# Patient Record
Sex: FEMALE | Race: WHITE | Employment: OTHER | URBAN - METROPOLITAN AREA
[De-identification: names, ages, dates, MRNs, and addresses within clinical notes are randomized per-mention and may not be internally consistent; named-entity substitution may affect disease eponyms.]

---

## 2018-01-30 ENCOUNTER — HOSPITAL ENCOUNTER (OUTPATIENT)
Dept: GENERAL RADIOLOGY | Age: 68
Discharge: HOME | End: 2018-01-30
Attending: INTERNAL MEDICINE
Payer: MEDICARE

## 2018-01-30 DIAGNOSIS — R05.9 COUGH: ICD-10-CM

## 2018-01-30 PROCEDURE — 71046 X-RAY EXAM CHEST 2 VIEWS: CPT

## 2018-03-06 PROBLEM — E11.59 HYPERTENSION COMPLICATING DIABETES (HCC): Status: ACTIVE | Noted: 2018-03-06

## 2018-03-06 PROBLEM — I15.2 HYPERTENSION COMPLICATING DIABETES (HCC): Status: ACTIVE | Noted: 2018-03-06

## 2020-01-03 ENCOUNTER — HOSPITAL ENCOUNTER (EMERGENCY)
Age: 70
Discharge: HOME | End: 2020-01-03
Attending: EMERGENCY MEDICINE
Payer: MEDICARE

## 2020-01-03 ENCOUNTER — APPOINTMENT (OUTPATIENT)
Dept: CT IMAGING | Age: 70
End: 2020-01-03
Attending: EMERGENCY MEDICINE
Payer: MEDICARE

## 2020-01-03 ENCOUNTER — APPOINTMENT (OUTPATIENT)
Dept: GENERAL RADIOLOGY | Age: 70
End: 2020-01-03
Attending: EMERGENCY MEDICINE
Payer: MEDICARE

## 2020-01-03 VITALS
HEART RATE: 83 BPM | DIASTOLIC BLOOD PRESSURE: 72 MMHG | HEIGHT: 61 IN | SYSTOLIC BLOOD PRESSURE: 156 MMHG | WEIGHT: 175 LBS | RESPIRATION RATE: 16 BRPM | BODY MASS INDEX: 33.04 KG/M2 | TEMPERATURE: 99.5 F | OXYGEN SATURATION: 95 %

## 2020-01-03 DIAGNOSIS — S76.311A PULLED HAMSTRING, RIGHT, INITIAL ENCOUNTER: Primary | ICD-10-CM

## 2020-01-03 DIAGNOSIS — M25.551 ACUTE RIGHT HIP PAIN: ICD-10-CM

## 2020-01-03 PROCEDURE — 73502 X-RAY EXAM HIP UNI 2-3 VIEWS: CPT

## 2020-01-03 PROCEDURE — 96372 THER/PROPH/DIAG INJ SC/IM: CPT

## 2020-01-03 PROCEDURE — 99282 EMERGENCY DEPT VISIT SF MDM: CPT

## 2020-01-03 PROCEDURE — 74011250636 HC RX REV CODE- 250/636: Performed by: EMERGENCY MEDICINE

## 2020-01-03 PROCEDURE — 73700 CT LOWER EXTREMITY W/O DYE: CPT

## 2020-01-03 RX ORDER — IBUPROFEN 200 MG
400 TABLET ORAL
COMMUNITY
End: 2020-01-17

## 2020-01-03 RX ORDER — MORPHINE SULFATE 10 MG/ML
8 INJECTION, SOLUTION INTRAMUSCULAR; INTRAVENOUS ONCE
Status: COMPLETED | OUTPATIENT
Start: 2020-01-03 | End: 2020-01-03

## 2020-01-03 RX ORDER — HYDROCODONE BITARTRATE AND ACETAMINOPHEN 5; 325 MG/1; MG/1
1 TABLET ORAL
Qty: 8 TAB | Refills: 0 | Status: SHIPPED | OUTPATIENT
Start: 2020-01-03 | End: 2020-01-06

## 2020-01-03 RX ADMIN — MORPHINE SULFATE 8 MG: 10 INJECTION INTRAVENOUS at 12:04

## 2020-01-03 NOTE — LETTER
[facility] EMERGENCY DEPT 
[address] [facility] [place] [address] 
[phone] Work/School Note Date: 1/3/2020 To Whom It May concern: 
 
Rogelio Lanes was seen and treated today in the emergency room by the following provider(s): 
Attending Provider: Marijean Soulier, DO. Rogelio Lanes may return to work 1/10/2020 Sincerely, 
 
 
 
 
Luz Moreno RN

## 2020-01-03 NOTE — ED NOTES
Pt was discharged and given instructions by Dr Tim Soares . Pt verbalized good understanding of all discharge instructions,prescriptions and F/U care. All questions answered. Pt given walker and note for work for return in 7 days. Pt in stable condition on discharge. Pt accompanied home by her daughter.

## 2020-01-03 NOTE — ED PROVIDER NOTES
This patient presents with severe right hip and groin pain. She fell 2 nights ago at nighttime. She was walking across some uneven asphalt when she tripped and fell to her right. No head injury. She had some numbness around the right hip right after falling. Since then, she has been walking but the pain has been getting a lot worse. Pain is severe and worse with any movement or walking. She points to her buttock where most of the pain is - also has groin pain. She has never broken a bone before. No distal numbness or tingling in the right foot. No right knee pain. Her daughter had to bring her to the hospital today due to the pain severity. No recent illnesses. Fall      Old chart reviewed: Last PCP office visit was March 2018. No recent ED visits. She has a history of type 2 diabetes. Past Medical History:   Diagnosis Date    Colon polyp     [facility] 5/24/2011    Environmental allergies     HTN (hypertension) 5/24/2011    Hyperlipidemia 5/24/2011    Panic attacks     RLS (restless legs syndrome) 5/24/2011       Past Surgical History:   Procedure Laterality Date    ENDOSCOPY, COLON, DIAGNOSTIC  3/20/12    OK. Rep 10 yrs.  Melanie Patino)    HX APPENDECTOMY           Family History:   Problem Relation Age of Onset    Thyroid Disease Mother        Social History     Socioeconomic History    Marital status:      Spouse name: Not on file    Number of children: Not on file    Years of education: Not on file    Highest education level: Not on file   Occupational History    Not on file   Social Needs    Financial resource strain: Not on file    Food insecurity:     Worry: Not on file     Inability: Not on file    Transportation needs:     Medical: Not on file     Non-medical: Not on file   Tobacco Use    Smoking status: Never Smoker    Smokeless tobacco: Never Used   Substance and Sexual Activity    Alcohol use: Yes     Comment: occ    Drug use: Never    Sexual activity: Not on file   Lifestyle    Physical activity:     Days per week: Not on file     Minutes per session: Not on file    Stress: Not on file   Relationships    Social connections:     Talks on phone: Not on file     Gets together: Not on file     Attends Yarsani service: Not on file     Active member of club or organization: Not on file     Attends meetings of clubs or organizations: Not on file     Relationship status: Not on file    Intimate partner violence:     Fear of current or ex partner: Not on file     Emotionally abused: Not on file     Physically abused: Not on file     Forced sexual activity: Not on file   Other Topics Concern    Not on file   Social History Narrative    Not on file         ALLERGIES: Patient has no known allergies. Review of Systems   All other systems reviewed and are negative. Vitals:    01/03/20 1139   BP: 156/72   Pulse: 83   Resp: 16   Temp: 99.5 °F (37.5 °C)   SpO2: 95%   Weight: 79.4 kg (175 lb)   Height: 5' 1\" (1.549 m)            Physical Exam  Constitutional:       General: She is in acute distress (In pain). HENT:      Head: Normocephalic. Nose: Nose normal.      Mouth/Throat:      Mouth: Mucous membranes are moist.   Eyes:      Pupils: Pupils are equal, round, and reactive to light. Cardiovascular:      Rate and Rhythm: Normal rate and regular rhythm. Pulses: Normal pulses. Pulmonary:      Effort: Pulmonary effort is normal.      Breath sounds: Normal breath sounds. Abdominal:      Palpations: Abdomen is soft. Tenderness: There is no tenderness. Musculoskeletal:      Comments: Interestingly enough, the left lower extremity is a little shorter than the right. Patient did not know about this. Distal neurovascular exam is normal in the right lower extremity. No bruising around the right hip. Patient has significant pain whenever she moves around in bed. Skin:     General: Skin is warm and dry.    Neurological: General: No focal deficit present. Mental Status: She is alert. Psychiatric:         Mood and Affect: Mood normal.          [facility]       Procedures    Reviewed x-rays. Morphine given for pain. We were unable to get an MRI today because of a full schedule. I opted for CT. That is negative for fracture. Looks like she may have a hamstring injury. That would line up with where most of her pain is. She was able to walk with a walker. Will discharge with some pain medication.

## 2020-01-03 NOTE — ED TRIAGE NOTES
Pt assisted to treatment area via wheelchair she states that on Wednesday night she was walking out to her car and tripped over a high spot in the drive causing her to fall onto her right hip. The pain in her hip seems to be getting worse, she took 2 Ibuprofen this morning with no relief. Denies being dizzy at the time of the fall.

## 2020-01-03 NOTE — DISCHARGE INSTRUCTIONS
Use a walker or cane in your left arm as needed. Follow-up with orthopedics. Patient Education        The Hip Joint: Anatomy Sketch    Current as of: June 26, 2019  Content Version: 12.2  © 2006-2019 [employer]. Care instructions adapted under license by "[employer] (which disclaims liability or warranty for this information). If you have questions about a medical condition or this instruction, always ask your healthcare professional. [place] [address] any warranty or liability for your use of this information. Patient Education        Hamstring Syndrome: Care Instructions  Your Care Instructions    The hamstring muscles are the three muscles in the back of the thigh. The sciatic nerve is a large nerve that runs from the low back down the legs. Hamstring syndrome is a condition caused by pressure on this nerve. The nerve may be pinched between the hamstring muscles and the pelvic bone or by the band of tissue that connects the hamstring muscles. This condition can cause pain in the hip and buttock and sometimes numbness down the back of the leg. It may hurt to sit down or stretch the hamstrings. You may have less pain when you lie on your back. Hamstring syndrome may be the result of wear and tear to the back and hamstrings. It is most often seen in people who play sports that involve running, kicking, or jumping. Other problems can cause leg pain and numbness. To diagnose hamstring syndrome, the doctor will ask about your symptoms and your activities and examine your leg. Hamstring syndrome usually gets better in a few weeks with rest and home care. The doctor may recommend exercises to stretch and strengthen your hip muscles. If home care doesn't help, your doctor may suggest a steroid shot to help reduce pain and swelling. Follow-up care is a key part of your treatment and safety.  Be sure to make and go to all appointments, and call your doctor if you are having problems. It's also a good idea to know your test results and keep a list of the medicines you take. How can you care for yourself at home? · Ask your doctor if you can take an over-the-counter pain medicine, such as acetaminophen (Tylenol), ibuprofen (Advil, Motrin), or naproxen (Aleve). Be safe with medicines. Read and follow all instructions on the label. · Put ice or a cold pack on the painful area for 10 to 20 minutes at a time. Try to do this every 1 to 2 hours for the next 3 days (when you are awake) or until the swelling goes down. Put a thin cloth between the ice and your skin. · After 2 or 3 days, if your swelling is gone, apply heat. Put a warm water bottle, a heating pad set on low, or a warm cloth over the painful area. Do not go to sleep with a heating pad on your skin. · Avoid sitting if possible, unless it feels better than standing. · Alternate lying down with short walks. Increase your walking distance as you are able to walk without making your symptoms worse. · Don't do anything that makes your symptoms worse. Return to your usual level of activity slowly. When should you call for help? Watch closely for changes in your health, and be sure to contact your doctor if:    · You have new or worse pain.     · You have new symptoms.     · You do not get better as expected. Where can you learn more? Go to http://chris-shashi.info/. Enter B785 in the search box to learn more about \"Hamstring Syndrome: Care Instructions. \"  Current as of: June 26, 2019  Content Version: 12.2  © 8400-7362 [employer]. Care instructions adapted under license by [employer] (which disclaims liability or warranty for this information). If you have questions about a medical condition or this instruction, always ask your healthcare professional. [address] any warranty or liability for your use of this information.

## 2020-09-02 ENCOUNTER — OFFICE VISIT (OUTPATIENT)
Dept: SURGERY | Age: 70
End: 2020-09-02
Payer: MEDICARE

## 2020-09-02 VITALS
HEART RATE: 71 BPM | HEIGHT: 62 IN | RESPIRATION RATE: 16 BRPM | TEMPERATURE: 99.1 F | BODY MASS INDEX: 31.5 KG/M2 | SYSTOLIC BLOOD PRESSURE: 144 MMHG | DIASTOLIC BLOOD PRESSURE: 84 MMHG | WEIGHT: 171.2 LBS | OXYGEN SATURATION: 96 %

## 2020-09-02 DIAGNOSIS — B96.89 BACTERIAL SKIN INFECTION OF TRUNK: Primary | ICD-10-CM

## 2020-09-02 DIAGNOSIS — L08.9 BACTERIAL SKIN INFECTION OF TRUNK: Primary | ICD-10-CM

## 2020-09-02 PROCEDURE — G8754 DIAS BP LESS 90: HCPCS | Performed by: SURGERY

## 2020-09-02 PROCEDURE — G8400 PT W/DXA NO RESULTS DOC: HCPCS | Performed by: SURGERY

## 2020-09-02 PROCEDURE — G8753 SYS BP > OR = 140: HCPCS | Performed by: SURGERY

## 2020-09-02 PROCEDURE — 3017F COLORECTAL CA SCREEN DOC REV: CPT | Performed by: SURGERY

## 2020-09-02 PROCEDURE — 1101F PT FALLS ASSESS-DOCD LE1/YR: CPT | Performed by: SURGERY

## 2020-09-02 PROCEDURE — G8417 CALC BMI ABV UP PARAM F/U: HCPCS | Performed by: SURGERY

## 2020-09-02 PROCEDURE — 99201 PR OFFICE OUTPATIENT NEW 10 MINUTES: CPT | Performed by: SURGERY

## 2020-09-02 PROCEDURE — G8536 NO DOC ELDER MAL SCRN: HCPCS | Performed by: SURGERY

## 2020-09-02 PROCEDURE — G8432 DEP SCR NOT DOC, RNG: HCPCS | Performed by: SURGERY

## 2020-09-02 PROCEDURE — 1090F PRES/ABSN URINE INCON ASSESS: CPT | Performed by: SURGERY

## 2020-09-02 PROCEDURE — G8427 DOCREV CUR MEDS BY ELIG CLIN: HCPCS | Performed by: SURGERY

## 2020-09-02 RX ORDER — CYANOCOBALAMIN (VITAMIN B-12) 2000 MCG
2000 TABLET ORAL DAILY
COMMUNITY

## 2020-09-02 RX ORDER — CEPHALEXIN 500 MG/1
500 CAPSULE ORAL 4 TIMES DAILY
Qty: 40 CAP | Refills: 0 | Status: SHIPPED | OUTPATIENT
Start: 2020-09-02 | End: 2020-09-12

## 2020-09-02 NOTE — LETTER
9/2/20 Patient: Africa Maloney YOB: 1950 Date of Visit: 9/2/2020 Geovanna Carbajal MD 
[facility] [phone] VIA In Basket Dear Geovanna Carbajal MD, Thank you for referring Ms. Anna Seo to [facility] for evaluation. My notes for this consultation are attached. If you have questions, please do not hesitate to call me. I look forward to following your patient along with you. Sincerely, Hansa Givens MD

## 2020-09-02 NOTE — PROGRESS NOTES
Subjective:      Gina Cormier  is a 81 y.o. female referred by Dr. Padmini Deluca for evaluation of LEFT chest abscess. Pt's had LEFT upper chest abscess for about 1 week. Per pt, is at the same site from lipoma excision as a child. Pt presented to her PCP last week who gently debrided the open wound. Pt not currently on any antibiotics. Past Medical History:   Diagnosis Date    Bacterial skin infection of trunk 9/2/2020    Colon polyp     DM (diabetes mellitus) ([address] 5/24/2011    Environmental allergies     HTN (hypertension) 5/24/2011    Hyperlipidemia 5/24/2011    Panic attacks     RLS (restless legs syndrome) 5/24/2011       Past Surgical History:   Procedure Laterality Date    ENDOSCOPY, COLON, DIAGNOSTIC  3/20/12    OK. Rep 10 yrs. Alveda Pair)    HX APPENDECTOMY         Social History     Tobacco Use    Smoking status: Never Smoker    Smokeless tobacco: Never Used   Substance Use Topics    Alcohol use: Yes     Comment: occ       Family History   Problem Relation Age of Onset    Thyroid Disease Mother        Current Outpatient Medications on File Prior to Visit   Medication Sig Dispense Refill    cyanocobalamin 1,000 mcg tablet Take 1,000 mcg by mouth daily. Patient states she takes 2,000 mg tablet a day      meloxicam (MOBIC) 7.5 mg tablet TAKE 1 TABLET BY MOUTH DAILY 90 Tab 3    rOPINIRole (REQUIP) 4 mg tab TAB TAKE TWO TABLET BY MOUTH IN THE EVENING 60 Tab 10    atorvastatin (LIPITOR) 10 mg tablet TAKE 1 TABLET BY MOUTH DAILY AT BEDTIME 90 Tab 3    losartan-hydroCHLOROthiazide (HYZAAR) 100-25 mg per tablet TAKE 1 TABLET BY MOUTH DAILY 90 Tab 0    amLODIPine (NORVASC) 10 mg tablet TAKE 1 TABLET BY MOUTH DAILY 90 Tab 0    aspirin delayed-release 81 mg tablet Take  by mouth daily.  traZODone (DESYREL) 100 mg tablet TAKE 1 TABLET BY MOUTH DAILY AT BEDTIME 90 Tab 3     No current facility-administered medications on file prior to visit.         No Known Allergies      Review of Systems:    A comprehensive review of systems was negative except for that written in the History of Present Illness. Objective:     Visit Vitals  /84 (BP 1 Location: Right arm, BP Patient Position: Sitting)   Pulse 71   Temp 99.1 °F (37.3 °C) (Oral)   Resp 16   Ht 5' 2\" (1.575 m)   Wt 171 lb 3.2 oz (77.7 kg)   SpO2 96%   BMI 31.31 kg/m²        Physical Exam:  CHEST: 3.5 cm linear area over the LEFT clavicle with 2 draining sinuses. 3 x 1.5 cm area of surrounding erythema. Labs: No results found for this or any previous visit (from the past 24 hour(s)). Assessment and Plan:       ICD-10-CM ICD-9-CM    1. Bacterial skin infection of trunk  L08.9 686.9     B96.89         Prescription sent for 10 day course of Keflex. Pt may try using warm compresses to encourage further draining. Explained that these infections can sometimes completely break down the entire cyst and cyst excision is not needed. If any residual nodule remains, will consider cyst excision once the infection has completely cleared and is no longer draining. Pt will f/u with me once they have finished their antibiotics. All questions were answered and they agree with this plan. This document was scribed by Erik Barreto as dictated by Dr. Rajwinder Ramos.      Signed By: Rimma Lopez MD     09/02/20

## 2020-09-02 NOTE — PROGRESS NOTES
1. Have you been to the ER, urgent care clinic since your last visit? Hospitalized since your last visit? No    2. Have you seen or consulted any other health care providers outside of the [address] [place] [address] since your last visit? Include any pap smears or colon screening.   No

## 2020-09-14 ENCOUNTER — DOCUMENTATION ONLY (OUTPATIENT)
Dept: SLEEP MEDICINE | Age: 70
End: 2020-09-14

## 2020-09-14 ENCOUNTER — VIRTUAL VISIT (OUTPATIENT)
Dept: SLEEP MEDICINE | Age: 70
End: 2020-09-14
Payer: MEDICARE

## 2020-09-14 DIAGNOSIS — G47.33 OSA (OBSTRUCTIVE SLEEP APNEA): Primary | ICD-10-CM

## 2020-09-14 DIAGNOSIS — I10 ESSENTIAL HYPERTENSION: ICD-10-CM

## 2020-09-14 DIAGNOSIS — G25.81 RLS (RESTLESS LEGS SYNDROME): ICD-10-CM

## 2020-09-14 PROCEDURE — 1090F PRES/ABSN URINE INCON ASSESS: CPT | Performed by: INTERNAL MEDICINE

## 2020-09-14 PROCEDURE — 99203 OFFICE O/P NEW LOW 30 MIN: CPT | Performed by: INTERNAL MEDICINE

## 2020-09-14 PROCEDURE — 1101F PT FALLS ASSESS-DOCD LE1/YR: CPT | Performed by: INTERNAL MEDICINE

## 2020-09-14 PROCEDURE — 3017F COLORECTAL CA SCREEN DOC REV: CPT | Performed by: INTERNAL MEDICINE

## 2020-09-14 PROCEDURE — G8756 NO BP MEASURE DOC: HCPCS | Performed by: INTERNAL MEDICINE

## 2020-09-14 PROCEDURE — G8400 PT W/DXA NO RESULTS DOC: HCPCS | Performed by: INTERNAL MEDICINE

## 2020-09-14 PROCEDURE — G8427 DOCREV CUR MEDS BY ELIG CLIN: HCPCS | Performed by: INTERNAL MEDICINE

## 2020-09-14 PROCEDURE — G8432 DEP SCR NOT DOC, RNG: HCPCS | Performed by: INTERNAL MEDICINE

## 2020-09-14 PROCEDURE — G8417 CALC BMI ABV UP PARAM F/U: HCPCS | Performed by: INTERNAL MEDICINE

## 2020-09-14 PROCEDURE — G8536 NO DOC ELDER MAL SCRN: HCPCS | Performed by: INTERNAL MEDICINE

## 2020-09-14 NOTE — PROGRESS NOTES
PCP - Ashly Werner. Snow's office informed of need for [facility] referral.  She will forward to [employer] but may take a few days to complete. Should have today's date as initial DOS to cover her appointment with Dr. Miladys Davila.

## 2020-09-14 NOTE — PATIENT INSTRUCTIONS
[address] [place]. 1668 [facility], [address] Tel.  [phone] Fax. 100 [facility] 60 1001 [facility] Ne, [address] Tel.  [phone] Fax. [phone] [address] Tel.  [phone] Fax. [phone] Sleep Apnea: After Your Visit Your Care Instructions Sleep apnea occurs when you frequently stop breathing for 10 seconds or longer during sleep. It can be mild to severe, based on the number of times per hour that you stop breathing or have slowed breathing. Blocked or narrowed airways in your nose, mouth, or throat can cause sleep apnea. Your airway can become blocked when your throat muscles and tongue relax during sleep. Sleep apnea is common, occurring in 1 out of 20 individuals. Individuals having any of the following characteristics should be evaluated and treated right away due to high risk and detrimental consequences from untreated sleep apnea: 
1. Obesity 2. Congestive Heart failure 3. Atrial Fibrillation 4. Uncontrolled Hypertension 5. Type II Diabetes 6. Night-time Arrhythmias 7. Stroke 8. Pulmonary Hypertension 9. High-risk Driving Populations (pilots, truck drivers, etc.) 10. Patients Considering Weight-loss Surgery How do you know you have sleep apnea? You probably have sleep apnea if you answer 'yes' to 3 or more of the following questions: S - Have you been told that you Snore? T - Are you often Tired during the day? O - Has anyone Observed you stop breathing while sleeping? P- Do you have (or are being treated for) high blood Pressure? B - Are you obese (Body Mass Index > 35)? A - Is your Age 48years old or older? N - Is your Neck size greater than 16 inches? G - Are you male Gender? A sleep physician can prescribe a breathing device that prevents tissues in the throat from blocking your airway.  Or your doctor may recommend using a dental device (oral breathing device) to help keep your airway open. In some cases, surgery may be needed to remove enlarged tissues in the throat. Follow-up care is a key part of your treatment and safety. Be sure to make and go to all appointments, and call your doctor if you are having problems. It's also a good idea to know your test results and keep a list of the medicines you take. How can you care for yourself at home? · Lose weight, if needed. It may reduce the number of times you stop breathing or have slowed breathing. · Go to bed at the same time every night. · Sleep on your side. It may stop mild apnea. If you tend to roll onto your back, sew a pocket in the back of your pajama top. Put a tennis ball into the pocket, and stitch the pocket shut. This will help keep you from sleeping on your back. · Avoid alcohol and medicines such as sleeping pills and sedatives before bed. · Do not smoke. Smoking can make sleep apnea worse. If you need help quitting, talk to your doctor about stop-smoking programs and medicines. These can increase your chances of quitting for good. · Prop up the head of your bed 4 to 6 inches by putting bricks under the legs of the bed. · Treat breathing problems, such as a stuffy nose, caused by a cold or allergies. · Use a continuous positive airway pressure (CPAP) breathing machine if lifestyle changes do not help your apnea and your doctor recommends it. The machine keeps your airway from closing when you sleep. · If CPAP does not help you, ask your doctor whether you should try other breathing machines. A bilevel positive airway pressure machine has two types of air pressureâone for breathing in and one for breathing out. Another device raises or lowers air pressure as needed while you breathe. · If your nose feels dry or bleeds when using one of these machines, talk with your doctor about increasing moisture in the air. A humidifier may help.  
· If your nose is runny or stuffy from using a breathing machine, talk with your doctor about using decongestants or a corticosteroid nasal spray. When should you call for help? Watch closely for changes in your health, and be sure to contact your doctor if: 
· You still have sleep apnea even though you have made lifestyle changes. · You are thinking of trying a device such as CPAP. · You are having problems using a CPAP or similar machine. Where can you learn more? Go to [employer]ok. Enter U105 in the search box to learn more about \"Sleep Apnea: After Your Visit. \"  
© 2907-6308 Healthwise, Incorporated. Care instructions adapted under license by [employer] (which disclaims liability or warranty for this information). This care instruction is for use with your licensed healthcare professional. If you have questions about a medical condition or this instruction, always ask your healthcare professional. Roxann Mendez any warranty or liability for your use of this information. PROPER SLEEP HYGIENE What to avoid · Do not have drinks with caffeine, such as coffee or black tea, for 8 hours before bed. · Do not smoke or use other types of tobacco near bedtime. Nicotine is a stimulant and can keep you awake. · Avoid drinking alcohol late in the evening, because it can cause you to wake in the middle of the night. · Do not eat a big meal close to bedtime. If you are hungry, eat a light snack. · Do not drink a lot of water close to bedtime, because the need to urinate may wake you up during the night. · Do not read or watch TV in bed. Use the bed only for sleeping and sexual activity. What to try · Go to bed at the same time every night, and wake up at the same time every morning. Do not take naps during the day. · Keep your bedroom quiet, dark, and cool. · Get regular exercise, but not within 3 to 4 hours of your bedtime. Bishop Jose · Sleep on a comfortable pillow and mattress. · If watching the clock makes you anxious, turn it facing away from you so you cannot see the time. · If you worry when you lie down, start a worry book. Well before bedtime, write down your worries, and then set the book and your concerns aside. · Try meditation or other relaxation techniques before you go to bed. · If you cannot fall asleep, get up and go to another room until you feel sleepy. Do something relaxing. Repeat your bedtime routine before you go to bed again. · Make your house quiet and calm about an hour before bedtime. Turn down the lights, turn off the TV, log off the computer, and turn down the volume on music. This can help you relax after a busy day. Drowsy Driving The [employer] cites drowsiness as a causing factor in more than 709,588 police reported crashes annually, resulting in 76,000 injuries and 1,500 deaths. Other surveys suggest 55% of people polled have driven while drowsy in the past year, 23% had fallen asleep but not crashed, 3% crashed, and 2% had and accident due to drowsy driving. Who is at risk? Young Drivers: One study of drowsy driving accidents states that 55% of the drivers were under 25 years. Of those, 75% were male. Shift Workers and Travelers: People who work overnight or travel across time zones frequently are at higher risk of experiencing Circadian Rhythm Disorders. They are trying to work and function when their body is programed to sleep. Sleep Deprived: Lack of sleep has a serious impact on your ability to pay attention or focus on a task. Consistently getting less than the average of 8 hours your body needs creates partial or cumulative sleep deprivation. Untreated Sleep Disorders: Sleep Apnea, Narcolepsy, R.L.S., and other sleep disorders (untreated) prevent a person from getting enough restful sleep.  This leads to excessive daytime sleepiness and increases the risk for drowsy driving accidents by up to 7 times. Medications / Alcohol: Even over the counter medications can cause drowsiness. Medications that impair a drivers attention should have a warning label. Alcohol naturally makes you sleepy and on its own can cause accidents. Combined with excessive drowsiness its effects are amplified. Signs of Drowsy Driving: * You don't remember driving the last few miles * You may drift out of your lorene * You are unable to focus and your thoughts wander * You may yawn more often than normal 
 * You have difficulty keeping your eyes open / nodding off * Missing traffic signs, speeding, or tailgating Prevention-  
Good sleep hygiene, lifestyle and behavioral choices have the most impact on drowsy driving. There is no substitute for sleep and the average person requires 8 hours nightly. If you find yourself driving drowsy, stop and sleep. Consider the sleep hygiene tips provided during your visit as well. Medication Refill Policy: Refills for all medications require 1 week advance notice. Please have your pharmacy fax a refill request. We are unable to fax, or call in \"controled substance\" medications and you will need to pick these prescriptions up from our office. Red Rover Activation Thank you for requesting access to Red Rover. Please follow the instructions below to securely access and download your online medical record. Red Rover allows you to send messages to your doctor, view your test results, renew your prescriptions, schedule appointments, and more. How Do I Sign Up? 1. In your internet browser, go to [email or web address]. 2. Click on the First Time User? Click Here link in the Sign In box. You will see the New Member Sign Up page. 3. Enter your Red Rover Access Code exactly as it appears below. You will not need to use this code after youve completed the sign-up process.  If you do not sign up before the expiration date, you must request a new code. Tvinci Access Code: Activation code not generated Current Tvinci Status: Active (This is the date your Tvinci access code will ) 4. Enter the last four digits of your Social Security Number (xxxx) and Date of Birth (mm/dd/yyyy) as indicated and click Submit. You will be taken to the next sign-up page. 5. Create a [employer] ID. This will be your Tvinci login ID and cannot be changed, so think of one that is secure and easy to remember. 6. Create a Tvinci password. You can change your password at any time. 7. Enter your Password Reset Question and Answer. This can be used at a later time if you forget your password. 8. Enter your e-mail address. You will receive e-mail notification when new information is available in [address] 9. Click Sign Up. You can now view and download portions of your medical record. 10. Click the Download Summary menu link to download a portable copy of your medical information. Additional Information If you have questions, please call [phone]. Remember, Tvinci is NOT to be used for urgent needs. For medical emergencies, dial 911.

## 2020-09-14 NOTE — PROGRESS NOTES
Patient gave verbal consent to virtual visit with Dr. Nona Steinberg today. She would like the link sent to her mobile [phone]. She was informed that this is a billable service and will be sent to her insurance. Patient also informed of copayment for visit.

## 2020-09-14 NOTE — PROGRESS NOTES
217 [facility]. [place], [address]  Tel.  [phone]  Fax. 100 [facility] 60  [place], [address] [facility]  Tel.  [phone]  Fax. [phone] 91 [facility] [address]  Tel.  [phone]  Fax. [phone]         Subjective:    Omid Membreno is a 81 y.o. female who was seen by synchronous (real-time) audio-video technology on 9/14/2020. Consent:  She is aware that this patient-initiated Telehealth encounter is a billable service, with coverage as determined by her insurance carrier. She is aware that she may receive a bill and has provided verbal consent to proceed: Yes    I was at home while conducting this encounter. Patient verified with 's license. She complains of snoring associated with snorting, periods of not breathing, kicking wiht legs, twitching of legs and feet and at times vivid dreams. Symptoms began several years ago, gradually worsening since that time. She usually can fall asleep in 60 minutes. Family or house members note snoring, periods of not breathing. She denies completely or partially paralyzed while falling asleep or waking up. Omid Membreno does wake up frequently at night. She is bothered by waking up too early and left unable to get back to sleep. She actually sleeps about 5 hours at night and wakes up about 4-6 times during the night. She does not work shifts. Paige Green indicates she does get too little sleep at night. Her bedtime is 9:00 pm. She awakens at 7:00 am. She does take naps. She takes 3 naps a week lasting 1 hour. She has the following observed behaviors:  ;  .  Other remarks:  She reports that she is currently taking Requip for RLS. [place] Sleepiness Score: 18   and Modified F.O.S.Q. Score Total / 2: 7.5       No Known Allergies      Current Outpatient Medications:     cyanocobalamin 1,000 mcg tablet, Take 1,000 mcg by mouth daily.  Patient states she takes 2,000 mg tablet a day, Disp: , Rfl:     meloxicam (MOBIC) 7.5 mg tablet, TAKE 1 TABLET BY MOUTH DAILY, Disp: 90 Tab, Rfl: 3    rOPINIRole (REQUIP) 4 mg tab TAB, TAKE TWO TABLET BY MOUTH IN THE EVENING, Disp: 60 Tab, Rfl: 10    atorvastatin (LIPITOR) 10 mg tablet, TAKE 1 TABLET BY MOUTH DAILY AT BEDTIME, Disp: 90 Tab, Rfl: 3    traZODone (DESYREL) 100 mg tablet, TAKE 1 TABLET BY MOUTH DAILY AT BEDTIME, Disp: 90 Tab, Rfl: 3    losartan-hydroCHLOROthiazide (HYZAAR) 100-25 mg per tablet, TAKE 1 TABLET BY MOUTH DAILY, Disp: 90 Tab, Rfl: 0    amLODIPine (NORVASC) 10 mg tablet, TAKE 1 TABLET BY MOUTH DAILY, Disp: 90 Tab, Rfl: 0    aspirin delayed-release 81 mg tablet, Take  by mouth daily. , Disp: , Rfl:      She  has a past medical history of Bacterial skin infection of trunk (9/2/2020), Colon polyp, DM (diabetes mellitus) ([address] (5/24/2011), Environmental allergies, HTN (hypertension) (5/24/2011), Hyperlipidemia (5/24/2011), Panic attacks, and RLS (restless legs syndrome) (5/24/2011). She  has a past surgical history that includes endoscopy, colon, diagnostic (3/20/12) and hx appendectomy. She family history includes Thyroid Disease in her mother. She  reports that she has never smoked. She has never used smokeless tobacco. She reports current alcohol use. She reports that she does not use drugs.      Review of Systems:  Constitutional:  No significant weight loss or weight gain  Eyes:  No blurred vision  CVS:  No significant chest pain  Pulm:  No significant shortness of breath  GI:  No significant nausea or vomiting  :  No significant nocturia  Musculoskeletal:  No significant joint pain at night  Skin:  No significant rashes  Neuro:  No significant dizziness   Psych:  No active mood issues    Sleep Review of Systems: notable for difficulty falling asleep; frequent awakenings at night;  regular dreaming noted; nightmares ;  early morning headaches; no memory problems; no concentration issues; no history of any automobile or occupational accidents due to daytime drowsiness. Objective:     Patient-Reported Vitals 9/14/2020   Patient-Reported Weight 171 lbs       Physical Exam completed by visual and auditory observation of patient with verbal input from patient. General:   Alert, oriented, not in acute distress   Eyes:  Anicteric Sclerae; no obvious strabismus   Nose:  No obvious nasal septum deviation    Neck:   Midline trachea, no visible mass   Chest/Lungs:  Respiratory effort normal, no visualized signs of difficulty breathing or respiratory distress   CVS:  No JVD   Extremities:  No obvious rashes noted on face, neck, or hands   Neuro:  No facial asymmetry, no focal deficits; no obvious tremor    Psych:  Normal affect,  normal countenance       Assessment:       ICD-10-CM ICD-9-CM    1. ANA (obstructive sleep apnea)  G47.33 327.23 POLYSOMNOGRAPHY 1 NIGHT   2. Essential hypertension  I10 401.9    3. RLS (restless legs syndrome)  G25.81 333.94    4. BMI 31.0-31.9,adult  Z68.31 V85.31          Plan:        Sleep testing was ordered for initial evaluation.  She was provided information on sleep apnea including coresponding risk factors and the importance of proper treatment.  Treatment options if indicated were reviewed today. Patient agrees to a trial of PAP therapy if indicated.  Counseling was provided regarding proper sleep hygiene, appropriate sleep schedule, need for sleep environment safety and safe driving.  Recommended a dedicated weight loss program through appropriate diet and exercise regimen as significant weight reduction has been shown to reduce severity of obstructive sleep apnea. Patient's phone number [phone] (home)  was reviewed and confirmed for accuracy. She gives permission for messages regarding results and appointments to be left at that number.     Pursuant to the emergency declaration under the [address] [facility] [facility] waiver authority and the Coronavirus Preparedness and Response Supplemental Appropriations Act, this Virtual Visit was conducted, with patient's consent, to reduce the patient's risk of exposure to COVID-19 and provide continuity of care for an established patient. Services were provided through a video synchronous discussion virtually to substitute for in-person clinic visit. Kandy Payne MD, [facility]  Electronically signed.  09/14/20

## 2020-09-15 ENCOUNTER — DOCUMENTATION ONLY (OUTPATIENT)
Dept: SLEEP MEDICINE | Age: 70
End: 2020-09-15

## 2020-09-15 ENCOUNTER — TELEPHONE (OUTPATIENT)
Dept: SLEEP MEDICINE | Age: 70
End: 2020-09-15

## 2020-09-16 NOTE — TELEPHONE ENCOUNTER
3rd Attempt to reach patient to schedule PSG per Dr. Abelardo Falk recommendation. KOWNhart message sent as well.

## 2020-10-01 ENCOUNTER — HOSPITAL ENCOUNTER (OUTPATIENT)
Dept: SLEEP MEDICINE | Age: 70
Discharge: HOME | End: 2020-10-01
Payer: MEDICARE

## 2020-10-01 DIAGNOSIS — G47.33 OSA (OBSTRUCTIVE SLEEP APNEA): ICD-10-CM

## 2020-10-01 PROCEDURE — 95810 POLYSOM 6/> YRS 4/> PARAM: CPT | Performed by: INTERNAL MEDICINE

## 2020-10-01 PROCEDURE — 95807 SLEEP STUDY ATTENDED: CPT | Performed by: INTERNAL MEDICINE

## 2020-10-02 ENCOUNTER — DOCUMENTATION ONLY (OUTPATIENT)
Dept: SLEEP MEDICINE | Age: 70
End: 2020-10-02

## 2020-10-02 VITALS
HEART RATE: 71 BPM | SYSTOLIC BLOOD PRESSURE: 129 MMHG | HEIGHT: 62 IN | WEIGHT: 171 LBS | OXYGEN SATURATION: 95 % | BODY MASS INDEX: 31.47 KG/M2 | TEMPERATURE: 97.8 F | DIASTOLIC BLOOD PRESSURE: 74 MMHG

## 2020-10-02 NOTE — PROGRESS NOTES
217 [facility] [place]. [place], [address]  Tel.  [phone]  Fax. 100 [facility] 60  [place], [address] [facility]  Tel.  [phone]  Fax. [phone] [address]   Tel.  [phone]  Fax. [phone]     Sleep Study Technical Notes        PRE-Test:  Haley Block (: 1950) arrived in the lobby. Patient was greeted, temperature checked (97.8 °) and screening questions asked. The patient was taken to the Sleep Center and taken directly to his/her room. BP (129/74) and SaO2 (95) were taken. Scale currently not available. Procedure explained to the patient and questions were answered. The patient expressed understanding of the procedure. Electrodes were applied without incident. The patient was placed in bed and the study was started. Acquisition Notes:   Lights off: 10:22     Respiratory events: Hypop    ECG:  NSR   PAP titration: N/A   Other comments: Pt woke up several times to use the restroom. She stated that she has restless leg syndrome and that she does not sleep most nights.  Desensitization Mask(s) Used: N/A    POST Test:   Patient was ready to leave as soon as she met the required 6 hours. .  Electrodes were removed. The patient was discharged after answering the Post Questionnaire. Patient stated that he/she was alert and ok to drive.  Equipment and room cleaned per infection control policy.

## 2020-10-07 ENCOUNTER — TELEPHONE (OUTPATIENT)
Dept: SLEEP MEDICINE | Age: 70
End: 2020-10-07

## 2020-10-07 ENCOUNTER — DOCUMENTATION ONLY (OUTPATIENT)
Dept: SLEEP MEDICINE | Age: 70
End: 2020-10-07

## 2020-10-07 DIAGNOSIS — G47.33 OSA (OBSTRUCTIVE SLEEP APNEA): Primary | ICD-10-CM

## 2020-10-07 DIAGNOSIS — U07.1 COVID-19: ICD-10-CM

## 2020-10-07 DIAGNOSIS — G47.33 OSA (OBSTRUCTIVE SLEEP APNEA): ICD-10-CM

## 2020-10-07 NOTE — TELEPHONE ENCOUNTER
Ankit Alvarenga is to be contacted by lead sleep technologist regarding results of Sleep Testing which was indicative of an average AHI of 10.6 per hour with an SpO2 vel of 87% . * A second polysomnogram has been ordered for mask fitting, PAP desensitizing protocol, and pressure titration. * Follow-up appointment will be scheduled 8-12 weeks following PAP initiation to gauge treatment response and compliance. Encounter Diagnoses   Name Primary?  ANA (obstructive sleep apnea) Yes    COVID-19        Orders Placed This Encounter    NOVEL CORONAVIRUS (COVID-19)     Standing Status:   Future     Standing Expiration Date:   1/7/2021     Scheduling Instructions:      1) Due to current limited availability of the COVID-19 PCR test, tests will be prioritized and may not be completed.              2) Order only if the test result will change clinical management or necessary for a return to mission-critical employment decision.              3) Print and instruct patient to adhere to [facility] home isolation program. (Link Above)              4) Set up or refer patient for a monitoring program.              5) Have patient sign up for and leverage Ravnhart (if not previously done).      Order Specific Question:   Status     Answer:   Asymptomatic/Surveillance(e.g. pre-op/pre-procedure/pre-delivery/transfer)     Order Specific Question:   Reason for Test     Answer:   Upcoming elective surgery/procedure/delivery, return to work, or discharge to another facility    SLEEP LAB (PAP TITRATION)     Standing Status:   Future     Standing Expiration Date:   4/7/2021     Order Specific Question:   Reason for Exam     Answer:   ANA

## 2020-10-12 ENCOUNTER — DOCUMENTATION ONLY (OUTPATIENT)
Dept: SLEEP MEDICINE | Age: 70
End: 2020-10-12

## 2020-10-19 ENCOUNTER — TELEPHONE (OUTPATIENT)
Dept: SLEEP MEDICINE | Age: 70
End: 2020-10-19

## 2020-10-21 ENCOUNTER — TELEPHONE (OUTPATIENT)
Dept: SLEEP MEDICINE | Age: 70
End: 2020-10-21

## 2020-11-17 ENCOUNTER — TELEPHONE (OUTPATIENT)
Dept: SLEEP MEDICINE | Age: 70
End: 2020-11-17

## 2020-11-17 LAB — SARS-COV-2, NAA: NOT DETECTED

## 2020-11-17 NOTE — TELEPHONE ENCOUNTER
Called patient to confirmed sleep study scheduled for Wed 11/18/2020. Patient reports she may experience a panic attack with all the wires and leads on her and request for a Rx to help her through the panic attacks or something to help her relax that night.  Please advise

## 2020-11-18 NOTE — TELEPHONE ENCOUNTER
Left message for patient to take either melatonin or other OTC sleep aid when she arrives for the sleep test.

## 2021-01-04 ENCOUNTER — HOSPITAL ENCOUNTER (OUTPATIENT)
Dept: GENERAL RADIOLOGY | Age: 71
Discharge: HOME | End: 2021-01-04
Attending: INTERNAL MEDICINE
Payer: MEDICARE

## 2021-01-04 DIAGNOSIS — R06.09 DOE (DYSPNEA ON EXERTION): ICD-10-CM

## 2021-01-04 PROCEDURE — 71046 X-RAY EXAM CHEST 2 VIEWS: CPT

## 2021-01-19 ENCOUNTER — HOSPITAL ENCOUNTER (OUTPATIENT)
Dept: MAMMOGRAPHY | Age: 71
Discharge: HOME | End: 2021-01-19
Attending: INTERNAL MEDICINE
Payer: MEDICARE

## 2021-01-19 ENCOUNTER — HOSPITAL ENCOUNTER (OUTPATIENT)
Dept: NON INVASIVE DIAGNOSTICS | Age: 71
Discharge: HOME | End: 2021-01-19
Attending: INTERNAL MEDICINE
Payer: MEDICARE

## 2021-01-19 ENCOUNTER — HOSPITAL ENCOUNTER (OUTPATIENT)
Dept: NUCLEAR MEDICINE | Age: 71
Discharge: HOME | End: 2021-01-19
Attending: INTERNAL MEDICINE
Payer: MEDICARE

## 2021-01-19 VITALS
HEIGHT: 62 IN | WEIGHT: 182 LBS | DIASTOLIC BLOOD PRESSURE: 79 MMHG | BODY MASS INDEX: 33.49 KG/M2 | SYSTOLIC BLOOD PRESSURE: 122 MMHG

## 2021-01-19 DIAGNOSIS — Z12.31 SCREENING MAMMOGRAM, ENCOUNTER FOR: ICD-10-CM

## 2021-01-19 DIAGNOSIS — R06.09 DOE (DYSPNEA ON EXERTION): ICD-10-CM

## 2021-01-19 LAB
STRESS BASELINE DIAS BP: 83 MMHG
STRESS BASELINE HR: 68 BPM
STRESS BASELINE SYS BP: 139 MMHG
STRESS ESTIMATED WORKLOAD: 1 METS
STRESS EXERCISE DUR MIN: NORMAL
STRESS PEAK DIAS BP: 74 MMHG
STRESS PEAK SYS BP: 146 MMHG
STRESS PERCENT HR ACHIEVED: 61 %
STRESS POST PEAK HR: 92 BPM
STRESS RATE PRESSURE PRODUCT: NORMAL BPM*MMHG
STRESS ST DEPRESSION: 0 MM
STRESS ST ELEVATION: 0 MM
STRESS TARGET HR: 152 BPM

## 2021-01-19 PROCEDURE — 77067 SCR MAMMO BI INCL CAD: CPT

## 2021-01-19 PROCEDURE — 78452 HT MUSCLE IMAGE SPECT MULT: CPT

## 2021-01-19 PROCEDURE — 74011250636 HC RX REV CODE- 250/636: Performed by: INTERNAL MEDICINE

## 2021-01-19 PROCEDURE — A9500 TC99M SESTAMIBI: HCPCS

## 2021-01-19 RX ORDER — SODIUM CHLORIDE 0.9 % (FLUSH) 0.9 %
20 SYRINGE (ML) INJECTION
Status: COMPLETED | OUTPATIENT
Start: 2021-01-19 | End: 2021-01-19

## 2021-01-19 RX ADMIN — REGADENOSON 0.4 MG: 0.08 INJECTION, SOLUTION INTRAVENOUS at 09:48

## 2021-01-19 RX ADMIN — Medication 20 ML: at 09:48

## 2021-03-10 ENCOUNTER — HOSPITAL ENCOUNTER (OUTPATIENT)
Dept: MAMMOGRAPHY | Age: 71
Discharge: HOME | End: 2021-03-10
Attending: INTERNAL MEDICINE
Payer: MEDICARE

## 2021-03-10 DIAGNOSIS — R92.8 ABNORMAL MAMMOGRAM: ICD-10-CM

## 2021-03-10 PROCEDURE — 77061 BREAST TOMOSYNTHESIS UNI: CPT

## 2021-03-10 PROCEDURE — 76642 ULTRASOUND BREAST LIMITED: CPT

## 2021-07-15 ENCOUNTER — APPOINTMENT (OUTPATIENT)
Dept: GENERAL RADIOLOGY | Age: 71
End: 2021-07-15
Attending: EMERGENCY MEDICINE
Payer: MEDICARE

## 2021-07-15 ENCOUNTER — HOSPITAL ENCOUNTER (EMERGENCY)
Age: 71
Discharge: HOME | End: 2021-07-16
Payer: MEDICARE

## 2021-07-15 VITALS
TEMPERATURE: 99.6 F | DIASTOLIC BLOOD PRESSURE: 79 MMHG | WEIGHT: 177 LBS | OXYGEN SATURATION: 94 % | RESPIRATION RATE: 18 BRPM | BODY MASS INDEX: 33.42 KG/M2 | HEART RATE: 86 BPM | SYSTOLIC BLOOD PRESSURE: 158 MMHG | HEIGHT: 61 IN

## 2021-07-15 DIAGNOSIS — M62.838 TRAPEZIUS MUSCLE SPASM: Primary | ICD-10-CM

## 2021-07-15 DIAGNOSIS — M25.512 ACUTE PAIN OF LEFT SHOULDER: ICD-10-CM

## 2021-07-15 DIAGNOSIS — M19.012 PRIMARY OSTEOARTHRITIS OF LEFT SHOULDER: ICD-10-CM

## 2021-07-15 PROCEDURE — 99283 EMERGENCY DEPT VISIT LOW MDM: CPT

## 2021-07-15 PROCEDURE — 93005 ELECTROCARDIOGRAM TRACING: CPT

## 2021-07-15 PROCEDURE — 75810000275 HC EMERGENCY DEPT VISIT NO LEVEL OF CARE

## 2021-07-15 PROCEDURE — 73030 X-RAY EXAM OF SHOULDER: CPT

## 2021-07-16 LAB
ATRIAL RATE: 85 BPM
CALCULATED R AXIS, ECG10: -8 DEGREES
CALCULATED T AXIS, ECG11: 66 DEGREES
DIAGNOSIS, 93000: NORMAL
P-R INTERVAL, ECG05: 128 MS
Q-T INTERVAL, ECG07: 386 MS
QRS DURATION, ECG06: 82 MS
QTC CALCULATION (BEZET), ECG08: 459 MS
VENTRICULAR RATE, ECG03: 85 BPM

## 2021-07-16 PROCEDURE — 74011250637 HC RX REV CODE- 250/637: Performed by: PHYSICIAN ASSISTANT

## 2021-07-16 RX ORDER — METHOCARBAMOL 500 MG/1
500 TABLET, FILM COATED ORAL 3 TIMES DAILY
Qty: 15 TABLET | Refills: 0 | OUTPATIENT
Start: 2021-07-16 | End: 2021-10-28

## 2021-07-16 RX ORDER — METHOCARBAMOL 500 MG/1
500 TABLET, FILM COATED ORAL ONCE
Status: COMPLETED | OUTPATIENT
Start: 2021-07-16 | End: 2021-07-16

## 2021-07-16 RX ADMIN — METHOCARBAMOL TABLETS 500 MG: 500 TABLET, COATED ORAL at 00:13

## 2021-07-16 NOTE — ED PROVIDER NOTES
EMERGENCY DEPARTMENT HISTORY AND PHYSICAL EXAM      Date: 7/15/2021  Patient Name: Christiano Sutton    History of Presenting Illness     Chief Complaint   Patient presents with    Shoulder Pain       History Provided By: Patient    HPI: Christiano Sutton, 78 y.o. female with a past medical history significant diabetes, hypertension and OA of knees presents to the ED with cc of left-sided neck pain radiating into the left scapula and left shoulder onset yesterday. Symptoms are exacerbated by turning her head to the left as well as lifting her left arm. She denies any known injury or trauma. She denies any recent falls or head injury. She does take meloxicam for osteoarthritis of her knees. Patient denies headache, chest pain, shortness of breath, fever. No previous surgery to the left shoulder. There are no other complaints, changes, or physical findings at this time. PCP: Adrienne Talbert MD    No current facility-administered medications on file prior to encounter. Current Outpatient Medications on File Prior to Encounter   Medication Sig Dispense Refill    rOPINIRole (REQUIP) 4 mg tab TAB TAKE 2 TABLETS BY MOUTH ONCE DAILY IN THE EVENING 60 Tab 11    metFORMIN (GLUCOPHAGE) 500 mg tablet Take 1 Tab by mouth daily. 90 Tab 3    cyanocobalamin 1,000 mcg tablet Take 1,000 mcg by mouth daily. Patient states she takes 2,000 mg tablet a day      meloxicam (MOBIC) 7.5 mg tablet TAKE 1 TABLET BY MOUTH DAILY 90 Tab 3    atorvastatin (LIPITOR) 10 mg tablet TAKE 1 TABLET BY MOUTH DAILY AT BEDTIME 90 Tab 3    losartan-hydroCHLOROthiazide (HYZAAR) 100-25 mg per tablet TAKE 1 TABLET BY MOUTH DAILY 90 Tab 0    amLODIPine (NORVASC) 10 mg tablet TAKE 1 TABLET BY MOUTH DAILY 90 Tab 0    aspirin delayed-release 81 mg tablet Take  by mouth daily.          Past History     Past Medical History:  Past Medical History:   Diagnosis Date    Bacterial skin infection of trunk 9/2/2020    Colon polyp     [facility]    Environmental allergies     HTN (hypertension) 5/24/2011    Hyperlipidemia 5/24/2011    Panic attacks     RLS (restless legs syndrome) 5/24/2011       Past Surgical History:  Past Surgical History:   Procedure Laterality Date    ENDOSCOPY, COLON, DIAGNOSTIC  3/20/12    OK. Rep 10 yrs. Tana Trinidad)    HX APPENDECTOMY      HX CHOLECYSTECTOMY         Family History:  Family History   Problem Relation Age of Onset    Thyroid Disease Mother        Social History:  Social History     Tobacco Use    Smoking status: Never Smoker    Smokeless tobacco: Never Used   Substance Use Topics    Alcohol use: Yes     Comment: occ    Drug use: Never       Allergies:  No Known Allergies      Review of Systems   Review of Systems   Constitutional: Negative for activity change, chills and fever. HENT: Negative for congestion, ear pain, rhinorrhea, sneezing and sore throat. Eyes: Negative for pain and visual disturbance. Respiratory: Negative for cough and shortness of breath. Cardiovascular: Negative for chest pain. Gastrointestinal: Negative for abdominal pain, diarrhea, nausea and vomiting. Genitourinary: Negative for dysuria and hematuria. Musculoskeletal: Positive for arthralgias, myalgias, neck pain and neck stiffness. Negative for gait problem. Skin: Negative for rash. Neurological: Negative for speech difficulty, weakness and headaches. Psychiatric/Behavioral: The patient is not nervous/anxious. All other systems reviewed and are negative. Physical Exam   Physical Exam  Vitals and nursing note reviewed. Constitutional:       General: She is not in acute distress. Appearance: Normal appearance. She is not ill-appearing or toxic-appearing. HENT:      Head: Normocephalic and atraumatic. Nose: Nose normal.      Mouth/Throat:      Mouth: Mucous membranes are moist.   Eyes:      Extraocular Movements: Extraocular movements intact. Conjunctiva/sclera: Conjunctivae normal.      Pupils: Pupils are equal, round, and reactive to light. Neck:      Vascular: No JVD. Trachea: Trachea normal.     Cardiovascular:      Rate and Rhythm: Normal rate. Pulses: Normal pulses. Heart sounds: Normal heart sounds. Pulmonary:      Effort: Pulmonary effort is normal. No respiratory distress. Breath sounds: Normal breath sounds. Musculoskeletal:         General: No deformity or signs of injury. Right shoulder: Normal.      Left shoulder: No deformity, effusion or bony tenderness. Decreased range of motion (ER). Normal pulse. Cervical back: Neck supple. No torticollis. Pain with movement and muscular tenderness present. No spinous process tenderness. Decreased range of motion. Comments: Left UE: full flexion   Lymphadenopathy:      Cervical: No cervical adenopathy. Skin:     General: Skin is warm and dry. Capillary Refill: Capillary refill takes less than 2 seconds. Findings: No rash. Neurological:      General: No focal deficit present. Mental Status: She is alert and oriented to person, place, and time. Cranial Nerves: No cranial nerve deficit. Psychiatric:         Mood and Affect: Mood normal.         Diagnostic Study Results     Labs -   No results found for this or any previous visit (from the past 48 hour(s)). Radiologic Studies -   Results from Hospital Encounter encounter on 07/15/21    XR SHOULDER LT AP/LAT MIN 2 V    Narrative  History: Pain    Technique:  XR SHOULDER LT AP/LAT MIN 2 V    COMPARISON: [None]  LIMITATIONS: [None]    BONES: [Normal]    JOINTS: [Mild degenerative change of the acromioclavicular joint]    SOFT TISSUES: [Normal]    OTHER: [None]    Impression  No acute findings. AC degenerative change     CT Results  (Last 48 hours)    None          Medical Decision Making   I am the first provider for this patient.     I reviewed the vital signs, available nursing notes, past medical history, past surgical history, family history and social history. Vital Signs-Reviewed the patient's vital signs. Wt Readings from Last 3 Encounters:   07/15/21 80.3 kg (177 lb)   05/11/21 79.8 kg (176 lb)   01/19/21 82.6 kg (182 lb)     Temp Readings from Last 3 Encounters:   07/15/21 99.6 °F (37.6 °C)   01/04/21 98.1 °F (36.7 °C)   10/02/20 97.8 °F (36.6 °C)     BP Readings from Last 3 Encounters:   07/15/21 (!) 158/79   05/11/21 125/75   01/19/21 122/79     Pulse Readings from Last 3 Encounters:   07/15/21 86   10/02/20 71   09/02/20 71       No data found. Records Reviewed: Nursing Notes and Old Medical Records    Provider Notes (Medical Decision Making):     MDM  Number of Diagnoses or Management Options  Acute pain of left shoulder  Primary osteoarthritis of left shoulder  Trapezius muscle spasm  Diagnosis management comments: DDX: arthritis, AC separation, dislocation, rotator cuff injury, cervical strain       Amount and/or Complexity of Data Reviewed  Tests in the radiology section of CPT®: ordered and reviewed        ED Course:   Initial assessment performed. The patients presenting problems have been discussed, and they are in agreement with the care plan formulated and outlined with them. I have encouraged them to ask questions as they arise throughout their visit. PROCEDURES    Procedures       Disposition     Disposition: DC- Adult Discharges: All of the diagnostic tests were reviewed and questions answered. Diagnosis, care plan and treatment options were discussed. The patient understands the instructions and will follow up as directed. The patients results have been reviewed with them. They have been counseled regarding their diagnosis. The patient verbally convey understanding and agreement of the signs, symptoms, diagnosis, treatment and prognosis and additionally agrees to follow up as recommended with their PCP in 24 - 48 hours.   They also agree with the care-plan and convey that all of their questions have been answered. I have also put together some discharge instructions for them that include: 1) educational information regarding their diagnosis, 2) how to care for their diagnosis at home, as well a 3) list of reasons why they would want to return to the ED prior to their follow-up appointment, should their condition change. Discharged    DISCHARGE PLAN:  1. Current Discharge Medication List      START taking these medications    Details   methocarbamoL (ROBAXIN) 500 mg tablet Take 1 Tablet by mouth three (3) times daily. Qty: 15 Tablet, Refills: 0         CONTINUE these medications which have NOT CHANGED    Details   rOPINIRole (REQUIP) 4 mg tab TAB TAKE 2 TABLETS BY MOUTH ONCE DAILY IN THE EVENING  Qty: 60 Tab, Refills: 11      metFORMIN (GLUCOPHAGE) 500 mg tablet Take 1 Tab by mouth daily. Qty: 90 Tab, Refills: 3    Associated Diagnoses: Type 2 diabetes mellitus without complication, without long-term current use of insulin (HCC)      cyanocobalamin 1,000 mcg tablet Take 1,000 mcg by mouth daily. Patient states she takes 2,000 mg tablet a day      meloxicam (MOBIC) 7.5 mg tablet TAKE 1 TABLET BY MOUTH DAILY  Qty: 90 Tab, Refills: 3      atorvastatin (LIPITOR) 10 mg tablet TAKE 1 TABLET BY MOUTH DAILY AT BEDTIME  Qty: 90 Tab, Refills: 3      losartan-hydroCHLOROthiazide (HYZAAR) 100-25 mg per tablet TAKE 1 TABLET BY MOUTH DAILY  Qty: 90 Tab, Refills: 0      amLODIPine (NORVASC) 10 mg tablet TAKE 1 TABLET BY MOUTH DAILY  Qty: 90 Tab, Refills: 0      aspirin delayed-release 81 mg tablet Take  by mouth daily.            2.   Follow-up Information     Follow up With Specialties Details Why Contact Info    Shemar Bullard MD Orthopedic Surgery Schedule an appointment as soon as possible for a visit  for follow up from ER visit [place] [id number]  [phone]      [facility] EMERGENCY DEPT Emergency Medicine  As needed, If symptoms worsen [address] [facility] [address]  [phone]        3. Return to ED if worse   4. Discharge Medication List as of 7/16/2021 12:10 AM      START taking these medications    Details   methocarbamoL (ROBAXIN) 500 mg tablet Take 1 Tablet by mouth three (3) times daily. , Normal, Disp-15 Tablet, R-0         CONTINUE these medications which have NOT CHANGED    Details   rOPINIRole (REQUIP) 4 mg tab TAB TAKE 2 TABLETS BY MOUTH ONCE DAILY IN THE EVENING, Normal, Disp-60 Tab, R-11      metFORMIN (GLUCOPHAGE) 500 mg tablet Take 1 Tab by mouth daily. , Normal, Disp-90 Tab, R-3      cyanocobalamin 1,000 mcg tablet Take 1,000 mcg by mouth daily. Patient states she takes 2,000 mg tablet a day, Historical Med      meloxicam (MOBIC) 7.5 mg tablet TAKE 1 TABLET BY MOUTH DAILY, Normal, Disp-90 Tab, R-3      atorvastatin (LIPITOR) 10 mg tablet TAKE 1 TABLET BY MOUTH DAILY AT BEDTIME, Normal, Disp-90 Tab, R-3      losartan-hydroCHLOROthiazide (HYZAAR) 100-25 mg per tablet TAKE 1 TABLET BY MOUTH DAILY, Normal, Disp-90 Tab, R-0      amLODIPine (NORVASC) 10 mg tablet TAKE 1 TABLET BY MOUTH DAILY, Normal, Disp-90 Tab, R-0      aspirin delayed-release 81 mg tablet Take  by mouth daily. , Historical Med             Diagnosis     Clinical Impression:   1. Trapezius muscle spasm    2. Acute pain of left shoulder    3.  Primary osteoarthritis of left shoulder

## 2021-07-16 NOTE — ED TRIAGE NOTES
Patient states that she is having shoulder pain in the left shoulder the pain is constant even when she is not moving it.  Patient says that this is new and she has not had this happen before and denies injury to this shoulder

## 2021-07-16 NOTE — ED NOTES
Patient is alert and orieneted at discharge taken to car in a wheelchair by staff to meet family member

## 2021-09-14 PROBLEM — B96.89 BACTERIAL SKIN INFECTION OF TRUNK: Status: RESOLVED | Noted: 2020-09-02 | Resolved: 2021-09-14

## 2021-09-14 PROBLEM — L08.9 BACTERIAL SKIN INFECTION OF TRUNK: Status: RESOLVED | Noted: 2020-09-02 | Resolved: 2021-09-14

## 2021-10-28 ENCOUNTER — HOSPITAL ENCOUNTER (EMERGENCY)
Age: 71
Discharge: HOME | End: 2021-10-28
Attending: EMERGENCY MEDICINE
Payer: MEDICARE

## 2021-10-28 ENCOUNTER — APPOINTMENT (OUTPATIENT)
Dept: CT IMAGING | Age: 71
End: 2021-10-28
Attending: EMERGENCY MEDICINE
Payer: MEDICARE

## 2021-10-28 ENCOUNTER — APPOINTMENT (OUTPATIENT)
Dept: GENERAL RADIOLOGY | Age: 71
End: 2021-10-28
Attending: EMERGENCY MEDICINE
Payer: MEDICARE

## 2021-10-28 VITALS
WEIGHT: 183 LBS | OXYGEN SATURATION: 95 % | TEMPERATURE: 98.3 F | SYSTOLIC BLOOD PRESSURE: 133 MMHG | HEIGHT: 61 IN | HEART RATE: 78 BPM | DIASTOLIC BLOOD PRESSURE: 80 MMHG | RESPIRATION RATE: 16 BRPM | BODY MASS INDEX: 34.55 KG/M2

## 2021-10-28 DIAGNOSIS — M54.6 ACUTE RIGHT-SIDED THORACIC BACK PAIN: Primary | ICD-10-CM

## 2021-10-28 DIAGNOSIS — M62.838 MUSCLE SPASM: ICD-10-CM

## 2021-10-28 LAB
ALBUMIN SERPL-MCNC: 3.7 G/DL (ref 3.5–5)
ALBUMIN/GLOB SERPL: 0.8 {RATIO} (ref 1.1–2.2)
ALP SERPL-CCNC: 151 U/L (ref 45–117)
ALT SERPL-CCNC: 29 U/L (ref 12–78)
ANION GAP SERPL CALC-SCNC: 9 MMOL/L (ref 5–15)
APPEARANCE UR: CLEAR
AST SERPL W P-5'-P-CCNC: 15 U/L (ref 15–37)
ATRIAL RATE: 88 BPM
BASOPHILS # BLD: 0 K/UL (ref 0–0.1)
BASOPHILS NFR BLD: 0 % (ref 0–1)
BILIRUB SERPL-MCNC: 0.3 MG/DL (ref 0.2–1)
BILIRUB UR QL: NEGATIVE
BUN SERPL-MCNC: 13 MG/DL (ref 6–20)
BUN/CREAT SERPL: 17 (ref 12–20)
CA-I BLD-MCNC: 9.1 MG/DL (ref 8.5–10.1)
CALCULATED R AXIS, ECG10: -7 DEGREES
CALCULATED T AXIS, ECG11: 40 DEGREES
CHLORIDE SERPL-SCNC: 95 MMOL/L (ref 97–108)
CO2 SERPL-SCNC: 34 MMOL/L (ref 21–32)
COLOR UR: YELLOW
CREAT SERPL-MCNC: 0.75 MG/DL (ref 0.55–1.02)
DIAGNOSIS, 93000: NORMAL
DIFFERENTIAL METHOD BLD: ABNORMAL
EOSINOPHIL # BLD: 0.1 K/UL (ref 0–0.4)
EOSINOPHIL NFR BLD: 1 % (ref 0–7)
ERYTHROCYTE [DISTWIDTH] IN BLOOD BY AUTOMATED COUNT: 13.4 % (ref 11.5–14.5)
GLOBULIN SER CALC-MCNC: 4.6 G/DL (ref 2–4)
GLUCOSE SERPL-MCNC: 154 MG/DL (ref 65–100)
GLUCOSE UR STRIP.AUTO-MCNC: NEGATIVE MG/DL
HCT VFR BLD AUTO: 39.1 % (ref 35–47)
HGB BLD-MCNC: 12.6 G/DL (ref 11.5–16)
HGB UR QL STRIP: NEGATIVE
IMM GRANULOCYTES # BLD AUTO: 0 K/UL (ref 0–0.04)
IMM GRANULOCYTES NFR BLD AUTO: 0 % (ref 0–0.5)
KETONES UR QL STRIP.AUTO: NEGATIVE MG/DL
LEUKOCYTE ESTERASE UR QL STRIP.AUTO: NEGATIVE
LIPASE SERPL-CCNC: 73 U/L (ref 73–393)
LYMPHOCYTES # BLD: 3.1 K/UL (ref 0.8–3.5)
LYMPHOCYTES NFR BLD: 24 % (ref 12–49)
MCH RBC QN AUTO: 27 PG (ref 26–34)
MCHC RBC AUTO-ENTMCNC: 32.2 G/DL (ref 30–36.5)
MCV RBC AUTO: 83.9 FL (ref 80–99)
MONOCYTES # BLD: 1.2 K/UL (ref 0–1)
MONOCYTES NFR BLD: 9 % (ref 5–13)
NEUTS SEG # BLD: 8.6 K/UL (ref 1.8–8)
NEUTS SEG NFR BLD: 66 % (ref 32–75)
NITRITE UR QL STRIP.AUTO: NEGATIVE
P-R INTERVAL, ECG05: 144 MS
PH UR STRIP: 5 [PH] (ref 5–8)
PLATELET # BLD AUTO: 307 K/UL (ref 150–400)
PMV BLD AUTO: 10.4 FL (ref 8.9–12.9)
POTASSIUM SERPL-SCNC: 3.3 MMOL/L (ref 3.5–5.1)
PROT SERPL-MCNC: 8.3 G/DL (ref 6.4–8.2)
PROT UR STRIP-MCNC: NEGATIVE MG/DL
Q-T INTERVAL, ECG07: 378 MS
QRS DURATION, ECG06: 86 MS
QTC CALCULATION (BEZET), ECG08: 457 MS
RBC # BLD AUTO: 4.66 M/UL (ref 3.8–5.2)
SODIUM SERPL-SCNC: 138 MMOL/L (ref 136–145)
SP GR UR REFRACTOMETRY: 1.02 (ref 1–1.03)
TROPONIN-HIGH SENSITIVITY: 9 NG/L (ref 0–51)
UROBILINOGEN UR QL STRIP.AUTO: 0.1 EU/DL (ref 0.2–1)
VENTRICULAR RATE, ECG03: 88 BPM
WBC # BLD AUTO: 13 K/UL (ref 3.6–11)

## 2021-10-28 PROCEDURE — 85025 COMPLETE CBC W/AUTO DIFF WBC: CPT

## 2021-10-28 PROCEDURE — 96376 TX/PRO/DX INJ SAME DRUG ADON: CPT

## 2021-10-28 PROCEDURE — 74011250636 HC RX REV CODE- 250/636: Performed by: EMERGENCY MEDICINE

## 2021-10-28 PROCEDURE — 71275 CT ANGIOGRAPHY CHEST: CPT

## 2021-10-28 PROCEDURE — 96374 THER/PROPH/DIAG INJ IV PUSH: CPT

## 2021-10-28 PROCEDURE — 80053 COMPREHEN METABOLIC PANEL: CPT

## 2021-10-28 PROCEDURE — 74011250637 HC RX REV CODE- 250/637: Performed by: EMERGENCY MEDICINE

## 2021-10-28 PROCEDURE — 84484 ASSAY OF TROPONIN QUANT: CPT

## 2021-10-28 PROCEDURE — 83690 ASSAY OF LIPASE: CPT

## 2021-10-28 PROCEDURE — 81003 URINALYSIS AUTO W/O SCOPE: CPT

## 2021-10-28 PROCEDURE — 36415 COLL VENOUS BLD VENIPUNCTURE: CPT

## 2021-10-28 PROCEDURE — 74022 RADEX COMPL AQT ABD SERIES: CPT

## 2021-10-28 PROCEDURE — 74011000636 HC RX REV CODE- 636: Performed by: EMERGENCY MEDICINE

## 2021-10-28 PROCEDURE — 99284 EMERGENCY DEPT VISIT MOD MDM: CPT

## 2021-10-28 PROCEDURE — 93005 ELECTROCARDIOGRAM TRACING: CPT

## 2021-10-28 RX ORDER — METHOCARBAMOL 500 MG/1
500 TABLET, FILM COATED ORAL
Status: COMPLETED | OUTPATIENT
Start: 2021-10-28 | End: 2021-10-28

## 2021-10-28 RX ORDER — MORPHINE SULFATE 2 MG/ML
2 INJECTION, SOLUTION INTRAMUSCULAR; INTRAVENOUS
Status: COMPLETED | OUTPATIENT
Start: 2021-10-28 | End: 2021-10-28

## 2021-10-28 RX ORDER — POTASSIUM CHLORIDE 750 MG/1
40 TABLET, FILM COATED, EXTENDED RELEASE ORAL
Status: COMPLETED | OUTPATIENT
Start: 2021-10-28 | End: 2021-10-28

## 2021-10-28 RX ORDER — METHOCARBAMOL 500 MG/1
500 TABLET, FILM COATED ORAL 2 TIMES DAILY
Qty: 10 TABLET | Refills: 0 | Status: SHIPPED | OUTPATIENT
Start: 2021-10-28 | End: 2021-11-01 | Stop reason: SDUPTHER

## 2021-10-28 RX ORDER — METHOCARBAMOL 500 MG/1
500 TABLET, FILM COATED ORAL 4 TIMES DAILY
Status: DISCONTINUED | OUTPATIENT
Start: 2021-10-28 | End: 2021-10-28

## 2021-10-28 RX ADMIN — METHOCARBAMOL TABLETS 500 MG: 500 TABLET, COATED ORAL at 10:39

## 2021-10-28 RX ADMIN — IOPAMIDOL 100 ML: 755 INJECTION, SOLUTION INTRAVENOUS at 13:23

## 2021-10-28 RX ADMIN — POTASSIUM CHLORIDE 40 MEQ: 750 TABLET, FILM COATED, EXTENDED RELEASE ORAL at 16:50

## 2021-10-28 RX ADMIN — MORPHINE SULFATE 2 MG: 2 INJECTION, SOLUTION INTRAMUSCULAR; INTRAVENOUS at 16:51

## 2021-10-28 RX ADMIN — MORPHINE SULFATE 2 MG: 2 INJECTION, SOLUTION INTRAMUSCULAR; INTRAVENOUS at 10:39

## 2021-10-28 NOTE — ED TRIAGE NOTES
Started 1 wk ago, upper right trapezius pain that goes aroud under right axillary to sternum, tender to touch all the way around, hurts to take a deep breath, hurts mid  to touch, no injury or or heavy lifting, Nauseated today, vomited x 2 yesterday,   Didn't take anything for it, tried to call  But haven't heard back yet.   Never happened before

## 2021-10-28 NOTE — ED PROVIDER NOTES
EMERGENCY DEPARTMENT HISTORY AND PHYSICAL EXAM      Date: 10/28/2021  Patient Name: Georgi Justice    History of Presenting Illness     Chief Complaint   Patient presents with    Back Pain       History Provided By: Patient    HPI: Georgi Justice, 78 y.o. female with a past medical history significant hypertension , DM presents to the ED with cc of right-sided thoracic back pain that started one week ago. Pain wraps around toward the front. It hurts to take deep breath and move. She has been taking ibuprofen with minimal relief. She denies chest pain but does have some mild abd pain and nausea. She states she was constipated and took a laxitive two days ago with some but not complete relief. She denies hard stools. She denies fever, chills, cough, congestion or known sick contacts. She had two x covid vaccination shots. She did not take her BP meds yet this morning. There are no other complaints, changes, or physical findings at this time. PCP: Jose Sparks MD    No current facility-administered medications on file prior to encounter. Current Outpatient Medications on File Prior to Encounter   Medication Sig Dispense Refill    rOPINIRole (REQUIP) 4 mg tab TAB TAKE 1 TABLET BY MOUTH TWO TIMES A DAY 60 Tablet 4    [DISCONTINUED] methocarbamoL (ROBAXIN) 500 mg tablet Take 1 Tablet by mouth three (3) times daily. 15 Tablet 0    metFORMIN (GLUCOPHAGE) 500 mg tablet Take 1 Tab by mouth daily. 90 Tab 3    cyanocobalamin 1,000 mcg tablet Take 1,000 mcg by mouth daily.  Patient states she takes 2,000 mg tablet a day      meloxicam (MOBIC) 7.5 mg tablet TAKE 1 TABLET BY MOUTH DAILY 90 Tab 3    atorvastatin (LIPITOR) 10 mg tablet TAKE 1 TABLET BY MOUTH DAILY AT BEDTIME 90 Tab 3    losartan-hydroCHLOROthiazide (HYZAAR) 100-25 mg per tablet TAKE 1 TABLET BY MOUTH DAILY 90 Tab 0    amLODIPine (NORVASC) 10 mg tablet TAKE 1 TABLET BY MOUTH DAILY 90 Tab 0    aspirin delayed-release 81 mg tablet Take by mouth daily. Past History     Past Medical History:  Past Medical History:   Diagnosis Date    Bacterial skin infection of trunk 9/2/2020    Colon polyp     DM (diabetes mellitus) ([facility] [address] 5/24/2011    Environmental allergies     HTN (hypertension) 5/24/2011    Hyperlipidemia 5/24/2011    Panic attacks     RLS (restless legs syndrome) 5/24/2011       Past Surgical History:  Past Surgical History:   Procedure Laterality Date    ENDOSCOPY, COLON, DIAGNOSTIC  3/20/12    OK. Rep 10 yrs. Frank Costa)    HX APPENDECTOMY      HX CHOLECYSTECTOMY         Family History:  Family History   Problem Relation Age of Onset    Thyroid Disease Mother        Social History:  Social History     Tobacco Use    Smoking status: Never Smoker    Smokeless tobacco: Never Used   Substance Use Topics    Alcohol use: Yes     Comment: occ    Drug use: Never       Allergies:  No Known Allergies        Review of Systems   Constitutional: Negative for activity change, appetite change, chills and fever. HENT: Negative. Negative for congestion and sore throat. Eyes: Negative. Respiratory: Negative. Negative for cough, chest tightness and shortness of breath. Cardiovascular: Negative. Negative for chest pain, palpitations and leg swelling. Gastrointestinal: Positive for abdominal distention, abdominal pain, constipation and nausea. Negative for diarrhea and vomiting. Genitourinary: Negative. Negative for dysuria, flank pain, frequency and urgency. Musculoskeletal: Positive for back pain. Negative for arthralgias and joint swelling. Skin: Negative. Negative for rash. Psychiatric/Behavioral: Negative. All other systems reviewed and are negative. Physical Exam     Physical Exam  Vitals and nursing note reviewed. Constitutional:       General: She is not in acute distress. Appearance: Normal appearance. She is obese. She is not ill-appearing or toxic-appearing.    HENT:      Head: Normocephalic. Mouth/Throat:      Mouth: Mucous membranes are moist.   Eyes:      Extraocular Movements: Extraocular movements intact. Conjunctiva/sclera: Conjunctivae normal.      Pupils: Pupils are equal, round, and reactive to light. Cardiovascular:      Rate and Rhythm: Normal rate and regular rhythm. Pulses: Normal pulses. Pulmonary:      Effort: Pulmonary effort is normal.      Breath sounds: Normal breath sounds. Abdominal:      General: There is distension. Tenderness: There is no abdominal tenderness. There is no guarding. Comments: V mild ttp epigastric region   Musculoskeletal:         General: Tenderness present. No swelling. Normal range of motion. Cervical back: Normal range of motion. Comments: ttp from upper thoracic thru lumbar right sided  With associated spasm in same distribution   Skin:     General: Skin is warm. Findings: No erythema or rash. Neurological:      General: No focal deficit present. Mental Status: She is alert and oriented to person, place, and time. Cranial Nerves: No cranial nerve deficit.    Psychiatric:         Mood and Affect: Mood normal.         Lab and Diagnostic Study Results     Labs -     Recent Results (from the past 12 hour(s))   EKG, 12 LEAD, INITIAL    Collection Time: 10/28/21  9:50 AM   Result Value Ref Range    Ventricular Rate 88 BPM    Atrial Rate 88 BPM    P-R Interval 144 ms    QRS Duration 86 ms    Q-T Interval 378 ms    QTC Calculation (Bezet) 457 ms    Calculated R Axis -7 degrees    Calculated T Axis 40 degrees    Diagnosis       Sinus rhythm with Premature atrial complexes  Minimal voltage criteria for LVH, may be normal variant  Borderline ECG  When compared with ECG of 15-JUL-2021 22:32,  Premature atrial complexes are now Present  Confirmed by BETH ESPINO, Pradeep Rubio ([id number]) on 10/28/2021 11:16:02 AM     LIPASE    Collection Time: 10/28/21 10:00 AM   Result Value Ref Range    Lipase 73 73 - 393 U/L   CBC WITH AUTOMATED DIFF    Collection Time: 10/28/21 10:00 AM   Result Value Ref Range    WBC 13.0 (H) 3.6 - 11.0 K/uL    RBC 4.66 3.80 - 5.20 M/uL    HGB 12.6 11.5 - 16.0 g/dL    HCT 39.1 35.0 - 47.0 %    MCV 83.9 80.0 - 99.0 FL    MCH 27.0 26.0 - 34.0 PG    MCHC 32.2 30.0 - 36.5 g/dL    RDW 13.4 11.5 - 14.5 %    PLATELET 976 099 - 288 K/uL    MPV 10.4 8.9 - 12.9 FL    NEUTROPHILS 66 32 - 75 %    LYMPHOCYTES 24 12 - 49 %    MONOCYTES 9 5 - 13 %    EOSINOPHILS 1 0 - 7 %    BASOPHILS 0 0 - 1 %    IMMATURE GRANULOCYTES 0 0.0 - 0.5 %    ABS. NEUTROPHILS 8.6 (H) 1.8 - 8.0 K/UL    ABS. LYMPHOCYTES 3.1 0.8 - 3.5 K/UL    ABS. MONOCYTES 1.2 (H) 0.0 - 1.0 K/UL    ABS. EOSINOPHILS 0.1 0.0 - 0.4 K/UL    ABS. BASOPHILS 0.0 0.0 - 0.1 K/UL    ABS. IMM. GRANS. 0.0 0.00 - 0.04 K/UL    DF AUTOMATED     METABOLIC PANEL, COMPREHENSIVE    Collection Time: 10/28/21 10:00 AM   Result Value Ref Range    Sodium 138 136 - 145 mmol/L    Potassium 3.3 (L) 3.5 - 5.1 mmol/L    Chloride 95 (L) 97 - 108 mmol/L    CO2 34 (H) 21 - 32 mmol/L    Anion gap 9 5 - 15 mmol/L    Glucose 154 (H) 65 - 100 mg/dL    BUN 13 6 - 20 mg/dL    Creatinine 0.75 0.55 - 1.02 mg/dL    BUN/Creatinine ratio 17 12 - 20      GFR est AA >60 >60 ml/min/1.73m2    GFR est non-AA >60 >60 ml/min/1.73m2    Calcium 9.1 8.5 - 10.1 mg/dL    Bilirubin, total 0.3 0.2 - 1.0 mg/dL    AST (SGOT) 15 15 - 37 U/L    ALT (SGPT) 29 12 - 78 U/L    Alk.  phosphatase 151 (H) 45 - 117 U/L    Protein, total 8.3 (H) 6.4 - 8.2 g/dL    Albumin 3.7 3.5 - 5.0 g/dL    Globulin 4.6 (H) 2.0 - 4.0 g/dL    A-G Ratio 0.8 (L) 1.1 - 2.2     TROPONIN-HIGH SENSITIVITY    Collection Time: 10/28/21 10:00 AM   Result Value Ref Range    Troponin-High Sensitivity 9 0 - 51 ng/L   URINALYSIS W/ RFLX MICROSCOPIC    Collection Time: 10/28/21 10:15 AM   Result Value Ref Range    Color Yellow      Appearance Clear Clear      Specific gravity 1.020 1.003 - 1.030      pH (UA) 5.0 5.0 - 8.0      Protein Negative Negative mg/dL Glucose Negative Negative mg/dL    Ketone Negative Negative mg/dL    Bilirubin Negative Negative      Blood Negative Negative      Urobilinogen 0.1 (L) 0.2 - 1.0 EU/dL    Nitrites Negative Negative      Leukocyte Esterase Negative Negative         Radiologic Studies -   @lastxrresult@  CT Results  (Last 48 hours)               10/28/21 1321  CTA CHEST ABD PELV W WO CONT Final result    Impression:  No acute vascular findings; 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 Incidental finding of 4 to 5 mm pulmonary nodule in the right upper lobe. This   can be followed up with CT chest in 12 months. Narrative:  EXAM: CTA CHEST ABD PELV W WO CONT       INDICATION: back pain and chest pain       COMPARISON: None. CONTRAST: 100 mL of Isovue-370       TECHNIQUE:     Multislice helical CT was performed for the chest, abdomen, and pelvis, from the   thoracic inlet to the iliac crest prior to and during uneventful rapid bolus   intravenous contrast administration. Contiguous axial images were reconstructed   and lung and soft tissue windows were generated. Coronal, sagittal, and MIP   reformations were generated to facilitate diagnosis. CT dose reduction was achieved through use of a standardized protocol tailored   for this examination and automatic exposure control for dose modulation. FINDINGS:   CT angiogram findings: Aorta: Mild-to-moderate atherosclerotic disease without aneurysm or acute   finding such as dissection. Great vessels: Except for hypoplastic right vertebral artery, the great vessels   are patent. The celiac axis, SMA, UGO, and bilateral renal arteries are patent. There is   replaced right hepatic artery arising from the proximal SMA, and anatomic   variant. 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 Widely patent bilateral iliac and imaged femoral arteries.        CT findings:   Lungs and airways: Pulmonary nodule up to 4 to 5 mm in the right upper lobe   (series 502, image 76). Central airways are patent. Mediastinum: No mediastinal or hilar or axillary adenopathy is appreciated. Pleural space: No pneumothorax or pleural effusion. Chest wall and bones: No aggressive bone lesion. Advanced degenerative changes   in the spine. Liver, biliary tree, and gallbladder: No focal enhancing hepatic lesion. No   biliary ductal dilatation. Prior cholecystectomy. Spleen: within normal limits. Pancreas: No mass or ductal dilatation. Kidneys and adrenals: No renal mass, obstructing calculus, or hydronephrosis. Unremarkable adrenals. Bowels: No bowel wall thickening or dilatation. Colonic diverticulosis. Appendix   is normal.       Peritoneum and retroperitoneum: No ascites or pneumoperitoneum. No   lymphadenopathy or aortic aneurysm. Pelvis: No mass or calculus. Abdominal wall and bones: No aggressive bone lesion. Advanced degenerative   changes in the spine. No abdominal wall mass. CXR Results  (Last 48 hours)               10/28/21 1025  XR ABD ACUTE W 1 V CHEST Final result    Impression:  No focal acute abnormality. Evidence for previous cholecystectomy. Suspect left kidney calculus. Round calcification in the left upper quadrant is present on previous study and   could represent a calcified splenic artery aneurysm, upper pole kidney calculus   or other. Consider CT if needed for additional evaluation. Narrative:  4 views acute abdomen series       HISTORY: Abdominal pain       COMPARISON: Chest 1/4/2021       Lungs are clear. No pleural fluid. Heart is normal size. Aortic uncoiling. Surgical clips in the right upper quadrant. There is a round calcification in   the left upper quadrant also present on prior study. There is multiple stool and   gas in the large bowel. No small bowel distention.  A small lower pole left   kidney calculus is questioned. There is a dextroconvex thoracolumbar scoliotic   curvature with spondylosis. CTA CHEST ABD PELV W WO CONT  Order: [id number]  Status:  Final result   Visible to patient:  Yes ([address]) Next appt:  01/13/2022 at 03:00 PM in Internal Medicine (Marci Goetz MD)  0 Result Notes  Details    Reading Physician Reading Date Result Priority   Juan Humphreys MD  [phone] 10/28/2021       Narrative & Impression  EXAM: CTA CHEST ABD PELV W WO CONT     INDICATION: back pain and chest pain     COMPARISON: None.     CONTRAST: 100 mL of Isovue-370     TECHNIQUE:    Multislice helical CT was performed for the chest, abdomen, and pelvis, from the  thoracic inlet to the iliac crest prior to and during uneventful rapid bolus  intravenous contrast administration. Contiguous axial images were reconstructed  and lung and soft tissue windows were generated. Coronal, sagittal, and MIP  reformations were generated to facilitate diagnosis.       CT dose reduction was achieved through use of a standardized protocol tailored  for this examination and automatic exposure control for dose modulation.       FINDINGS:  CT angiogram findings: Aorta: Mild-to-moderate atherosclerotic disease without aneurysm or acute  finding such as dissection.     Great vessels: Except for hypoplastic right vertebral artery, the great vessels  are patent.     The celiac axis, SMA, UGO, and bilateral renal arteries are patent. There is  replaced right hepatic artery arising from the proximal SMA, and anatomic  variant. 1.6 x 1.4 cm calcified aneurysm arising from the splenic artery at the  hilum.     Widely patent bilateral iliac and imaged femoral arteries.     CT findings:  Lungs and airways: Pulmonary nodule up to 4 to 5 mm in the right upper lobe  (series 502, image 76). Central airways are patent.      Mediastinum: No mediastinal or hilar or axillary adenopathy is appreciated.     Pleural space: No pneumothorax or pleural effusion.     Chest wall and bones: No aggressive bone lesion. Advanced degenerative changes  in the spine.     Liver, biliary tree, and gallbladder: No focal enhancing hepatic lesion. No  biliary ductal dilatation. Prior cholecystectomy.     Spleen: within normal limits.     Pancreas: No mass or ductal dilatation.     Kidneys and adrenals: No renal mass, obstructing calculus, or hydronephrosis. Unremarkable adrenals.     Bowels: No bowel wall thickening or dilatation. Colonic diverticulosis. Appendix  is normal.     Peritoneum and retroperitoneum: No ascites or pneumoperitoneum. No  lymphadenopathy or aortic aneurysm.     Pelvis: No mass or calculus.     Abdominal wall and bones: No aggressive bone lesion. Advanced degenerative  changes in the spine. No abdominal wall mass.     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     Incidental finding of 4 to 5 mm pulmonary nodule in the right upper lobe. This  can be followed up with CT chest in 12 months.         Specimen Collected: 10/28/21 13:43 Last Resulted: 10/28/21 14:43      Order Details    View Encounter    Lab and Collection Details    Routing    Result History              Other Results from 10/28/2021    Contains abnormal data URINALYSIS W/ RFLX MICROSCOPIC    Status:  Final result   Visible to patient:  Yes ([address]) Next appt:  01/13/2022 at 03:00 PM in Internal Medicine (Marci Goetz MD)  Order: [id number]  0 Result Notes   Ref Range & Units 10:15 6 yr ago   Color   Yellow  YELLOW/STRAW CM    Comment: Color Reference Range: Straw, Yellow or Dark Yellow   Appearance Clear   Clear  CLEAR R    Specific gravity 1.003 - 1.030   1.020     pH (UA) 5.0 - 8.0   5.0  7.0    Protein Negative mg/dL Negative  NEGATIVE  R    Glucose Negative mg/dL Negative  NEGATIVE  R    Ketone Negative mg/dL Negative  NEGATIVE  R    Bilirubin Negative   Negative NEGATIVE  R    Blood Negative   Negative  NEGATIVE  R    Urobilinogen 0.2 - 1.0 EU/dL 0.1Low   0.2    Nitrites Negative   Negative  NEGATIVE  R    Leukocyte Esterase Negative   Negative  NEGATIVE  R    Resulting Agency  [facility]         Specimen Collected: 10/28/21 10:15 Last Resulted: 10/28/21 10:59       Lab Flowsheet    Order Details    View Encounter    Lab and Collection Details    Routing    Result History        CM=Additional comments  R=Reference range differs from displayed range              LIPASE    Status:  Final result   Visible to patient:  Yes (MyChart) Next appt:  01/13/2022 at 03:00 PM in Internal Medicine (Lilo Fountain MD)  Order: [id number]  0 Result Notes   Ref Range & Units 10:00   Lipase 73 - 393 U/L 73    Resulting Agency  [facility]         Specimen Collected: 10/28/21 10:00 Last Resulted: 10/28/21 10:36       Lab Flowsheet    Order Details    View Encounter    Lab and Collection Details    Routing    Result History                 Contains abnormal data CBC WITH AUTOMATED DIFF    Status:  Final result   Visible to patient:  Yes ([place]) Next appt:  01/13/2022 at 03:00 PM in Internal Medicine (Lilo Fountain MD)  Order: [id number]  0 Result Notes   Ref Range & Units 10:00 9 mo ago 9 yr ago   WBC 3.6 - 11.0 K/uL 13.0High   10.4 R  7.7 R    RBC 3.80 - 5.20 M/uL 4.66  4.95 R  4.52 R    HGB 11.5 - 16.0 g/dL 12.6  13.6 R  12.4 R    HCT 35.0 - 47.0 % 39.1  41.2 R  38.4 R    MCV 80.0 - 99.0 FL 83.9  83 R  85 R    MCH 26.0 - 34.0 PG 27.0  27.5 R  27.4 R    MCHC 30.0 - 36.5 g/dL 32.2  33.0 R  32.3 R    RDW 11.5 - 14.5 % 13.4  13.0 R  13.6 R    PLATELET 068 - 514 K/uL 307  298 R  317 R    MPV 8.9 - 12.9 FL 10.4      NEUTROPHILS 32 - 75 % 66  50 R  43 R    LYMPHOCYTES 12 - 49 % 24   47High  R    MONOCYTES 5 - 13 % 9  7 R  6 R    EOSINOPHILS 0 - 7 % 1  3 R  4    BASOPHILS 0 - 1 % 0  1 R  0 R    IMMATURE GRANULOCYTES 0.0 - 0.5 % 0  0 R  0 R    ABS. NEUTROPHILS 1.8 - 8.0 K/UL 8.6High   5.3 R  3.4 R    ABS. LYMPHOCYTES 0.8 - 3.5 K/UL 3.1   3.6 R    ABS. MONOCYTES 0.0 - 1.0 K/UL 1.2High   0.7 R  0.5 R    ABS. EOSINOPHILS 0.0 - 0.4 K/UL 0.1  0.3 R  0.3 R    ABS. BASOPHILS 0.0 - 0.1 K/UL 0.0  0.1 R  0.0 R    ABS. IMM. GRANS. 0.00 - 0.04 K/UL 0.0  0.0 R  0.0 R    DF   AUTOMATED      Resulting Agency  [facility] [address]         Specimen Collected: 10/28/21 10:00 Last Resulted: 10/28/21 10:11       Lab Flowsheet    Order Details    View Encounter    Lab and Collection Details    Routing    Result History        R=Reference range differs from displayed range              Contains abnormal data METABOLIC PANEL, COMPREHENSIVE    Status:  Final result   Visible to patient:  Yes (MyChart) Next appt:  01/13/2022 at 03:00 PM in Internal Medicine (Maggy Wilson MD)  Order: [id number]  0 Result Notes   Ref Range & Units 10:00 1 mo ago 5 mo ago 9 mo ago   Sodium 136 - 145 mmol/L 138  139 R  139 R  140 R    Potassium 3.5 - 5.1 mmol/L 3.3Low   4.2 R  4.1 R  3.8 R    Chloride 97 - 108 mmol/L 95Low   96 R  96 R  96 R    CO2 21 - 32 mmol/L 34High   32High  R  27 R  31High  R    Anion gap 5 - 15 mmol/L 9       Glucose 65 - 100 mg/dL 154High   117High  R  114High  R  116High  R    BUN 6 - 20 mg/dL 13  9 R  16 R  13 R    Creatinine 0.55 - 1.02 mg/dL 0.75  0.66 R  0.81 R  0.79 R    BUN/Creatinine ratio 12 - 20   17  14 R  20 R  16 R    GFR est AA >60 ml/min/1.73m2 >60  105 R, CM  86 R, CM  89 R    GFR est non-AA >60 ml/min/1.73m2 >60  91 R  75 R  77 R    Comment: Estimated GFR is calculated using the IDMS-traceable Modification of Diet in Renal Disease (MDRD) Study equation, reported for both  Americans (GFRAA) and non- Americans (GFRNA), and normalized to 1.73m2 body surface area. The physician must decide which value applies to the patient. The MDRD study equation should only be used in individuals age 25 or older.  It has not been validated for the following: pregnant women, patients with serious comorbid conditions, or on certain medications, or persons with extremes of body size, muscle mass, or nutritional status. Calcium 8.5 - 10.1 mg/dL 9.1  10.0 R  10.0 R  10.0 R    Bilirubin, total 0.2 - 1.0 mg/dL 0.3  0.3 R  0.3 R  0.4 R    AST (SGOT) 15 - 37 U/L 15  24 R  23 R  28 R    ALT (SGPT) 12 - 78 U/L 29  28 R  21 R  26 R    Alk. phosphatase 45 - 117 U/L 151High   141High  R, CM  144High  R  140High  R    Protein, total 6.4 - 8.2 g/dL 8. 3High   8.1 R  8.1 R  8.3 R    Albumin 3.5 - 5.0 g/dL 3.7  4.6 R  4.7 R  4.7 R    Globulin 2.0 - 4.0 g/dL 4.6High        A-G Ratio 1.1 - 2.2   0.8Low   1.3 R  1.4 R  1.3 R    Resulting Agency  [facility] 21 [id number]         Specimen Collected: 10/28/21 10:00 Last Resulted: 10/28/21 10:36       Lab Flowsheet    Order Details    View Encounter    Lab and Collection Details    Routing    Result History        CM=Additional comments  R=Reference range differs from displayed range              TROPONIN-HIGH SENSITIVITY    Status:  Final result   Visible to patient:  Yes (MyChart) Next appt:  01/13/2022 at 03:00 PM in Internal Medicine (Hermelinda Quintero MD)  Order: [id number]  0 Result Notes   Ref Range & Units 10:00   Troponin-High Sensitivity 0 - 51 ng/L 9    Comment: Up to 50% of normal patients may have low levels of detectable troponin (within reference range) circulating in the blood. Mild elevations in troponin that are above the reference range, may be present with other cardiac and non-cardiac disease states. Two or three serial tests at two hour intervals, are recommended to evaluate changes in circulating troponin over time.    [facility] [facility]         Specimen Collected: 10/28/21 10:00 Last Resulted: 10/28/21 10:40       Lab Flowsheet    Order Details    View Encounter    Lab and Collection Details    Routing    Result History              Relevant Priors    Procedure Date Study Status PACS Link   CT LOW EXT RT WO CONT 01/03/2020 Final  Images   XR SHOULDER LT AP/LAT MIN 2 V 07/15/2021 Final  Images   US BREAST LT LIMITED=<3 QUAD 03/10/2021 Final  Images   IRWIN 3D MATHEW W MAMMO LT DX INCL CAD 03/10/2021 Final  Images   NUCLEAR CARDIAC STRESS TEST 01/19/2021 Final  Images     EKG interpretation: (Preliminary) 9:50 (9:51 read)  Rhythm: normal sinus rhythm and PAC's; and regular . Rate (approx.): 88; Axis: normal; SD interval: normal; QRS interval: normal ; ST/T wave: normal; borderline EKG    Medical Decision Making   - I am the first provider for this patient. - I reviewed the vital signs, available nursing notes, past medical history, past surgical history, family history and social history. - Initial assessment performed. The patients presenting problems have been discussed, and they are in agreement with the care plan formulated and outlined with them. I have encouraged them to ask questions as they arise throughout their visit. Vital Signs-Reviewed the patient's vital signs. Patient Vitals for the past 12 hrs:   Temp Pulse Resp BP SpO2   10/28/21 1808 -- 78 16 133/80 95 %   10/28/21 1655 -- 82 16 (!) 150/80 95 %   10/28/21 1526 -- 83 16 (!) 151/101 94 %   10/28/21 0947 98.3 °F (36.8 °C) 93 19 (!) 174/82 95 %       Records Reviewed: Nursing Notes    The patient presents with back pain with a differential diagnosis of muscle spasm, thoracic strain, UTI, pyelonephritis,  kidney stone, pneumonia and pulmonary embolus, AAA, aortic dissection although pain appears mechanical in nature and has been ongoing for a week. Pt hypertensive but did not take AM medication for BP.       ED Course:     ED Course as of Oct 28 1952   Thu Oct 28, 2021   1620 CTA CHEST ABD PELV W WO CONT [LG]      ED Course User Index  [LG] Aniket Honeycutt MD       Provider Notes (Medical Decision Making):     MDM given patient's underlying medical conditions of HTN and DM, workup requires labs, EKG and acute series. Will concurrently treat pain in ED. Procedures   Medical Decision Makingedical Decision Making      Disposition   Disposition: DC- Adult Discharges: All of the diagnostic tests were reviewed and questions answered. Diagnosis, care plan and treatment options were discussed. The patient understands the instructions and will follow up as directed. The patients results have been reviewed with them. They have been counseled regarding their diagnosis. The patient verbally convey understanding and agreement of the signs, symptoms, diagnosis, treatment and prognosis and additionally agrees to follow up as recommended with their PCP in 24 - 48 hours. They also agree with the care-plan and convey that all of their questions have been answered. I have also put together some discharge instructions for them that include: 1) educational information regarding their diagnosis, 2) how to care for their diagnosis at home, as well a 3) list of reasons why they would want to return to the ED prior to their follow-up appointment, should their condition change. DISCHARGE PLAN:  1. Current Discharge Medication List      CONTINUE these medications which have NOT CHANGED    Details   rOPINIRole (REQUIP) 4 mg tab TAB TAKE 1 TABLET BY MOUTH TWO TIMES A DAY  Qty: 60 Tablet, Refills: 4      methocarbamoL (ROBAXIN) 500 mg tablet Take 1 Tablet by mouth three (3) times daily. Qty: 15 Tablet, Refills: 0      metFORMIN (GLUCOPHAGE) 500 mg tablet Take 1 Tab by mouth daily. Qty: 90 Tab, Refills: 3    Associated Diagnoses: Type 2 diabetes mellitus without complication, without long-term current use of insulin (HCC)      cyanocobalamin 1,000 mcg tablet Take 1,000 mcg by mouth daily.  Patient states she takes 2,000 mg tablet a day      meloxicam (MOBIC) 7.5 mg tablet TAKE 1 TABLET BY MOUTH DAILY  Qty: 90 Tab, Refills: 3      atorvastatin (LIPITOR) 10 mg tablet TAKE 1 TABLET BY MOUTH DAILY AT BEDTIME  Qty: 90 Tab, Refills: 3      losartan-hydroCHLOROthiazide (HYZAAR) 100-25 mg per tablet TAKE 1 TABLET BY MOUTH DAILY  Qty: 90 Tab, Refills: 0      amLODIPine (NORVASC) 10 mg tablet TAKE 1 TABLET BY MOUTH DAILY  Qty: 90 Tab, Refills: 0      aspirin delayed-release 81 mg tablet Take  by mouth daily. 2.   Follow-up Information     Follow up With Specialties Details Why Contact Info    Remi Hernandes MD Internal Medicine Call in 1 day  [address] Emergency Medicine  As needed, If symptoms worsen [address] [place] [id number] [phone]    Remi Hernandes MD Internal Medicine Schedule an appointment as soon as possible for a visit   [address] [id number] [phone]          3. Return to ED if worse   4. Discharge Medication List as of 10/28/2021  6:05 PM      CONTINUE these medications which have CHANGED    Details   methocarbamoL (Robaxin) 500 mg tablet Take 1 Tablet by mouth two (2) times a day., Normal, Disp-10 Tablet, R-0         CONTINUE these medications which have NOT CHANGED    Details   rOPINIRole (REQUIP) 4 mg tab TAB TAKE 1 TABLET BY MOUTH TWO TIMES A DAY, Normal, Disp-60 Tablet, R-4      metFORMIN (GLUCOPHAGE) 500 mg tablet Take 1 Tab by mouth daily. , Normal, Disp-90 Tab, R-3      cyanocobalamin 1,000 mcg tablet Take 1,000 mcg by mouth daily.  Patient states she takes 2,000 mg tablet a day, Historical Med      meloxicam (MOBIC) 7.5 mg tablet TAKE 1 TABLET BY MOUTH DAILY, Normal, Disp-90 Tab, R-3      atorvastatin (LIPITOR) 10 mg tablet TAKE 1 TABLET BY MOUTH DAILY AT BEDTIME, Normal, Disp-90 Tab, R-3      losartan-hydroCHLOROthiazide (HYZAAR) 100-25 mg per tablet TAKE 1 TABLET BY MOUTH DAILY, Normal, Disp-90 Tab, R-0      amLODIPine (NORVASC) 10 mg tablet TAKE 1 TABLET BY MOUTH DAILY, Normal, Disp-90 Tab, R-0      aspirin delayed-release 81 mg tablet Take  by mouth daily. , Historical Med               Diagnosis     Clinical Impression:   1. Acute right-sided thoracic back pain    2. Muscle spasm        Attestations:    Cathi Carrillo MD    Please note that this dictation was completed with [employer], the computer voice recognition software. Quite often unanticipated grammatical, syntax, homophones, and other interpretive errors are inadvertently transcribed by the computer software. Please disregard these errors. Please excuse any errors that have escaped final proofreading. Thank you.

## 2021-10-29 ENCOUNTER — HOSPITAL ENCOUNTER (EMERGENCY)
Age: 71
Discharge: HOME | End: 2021-10-30
Attending: EMERGENCY MEDICINE
Payer: MEDICARE

## 2021-10-29 VITALS
BODY MASS INDEX: 34.55 KG/M2 | DIASTOLIC BLOOD PRESSURE: 81 MMHG | TEMPERATURE: 99.8 F | RESPIRATION RATE: 16 BRPM | WEIGHT: 183 LBS | HEIGHT: 61 IN | OXYGEN SATURATION: 93 % | SYSTOLIC BLOOD PRESSURE: 156 MMHG | HEART RATE: 79 BPM

## 2021-10-29 DIAGNOSIS — S39.012A STRAIN OF LUMBAR REGION, INITIAL ENCOUNTER: Primary | ICD-10-CM

## 2021-10-29 PROCEDURE — 96372 THER/PROPH/DIAG INJ SC/IM: CPT

## 2021-10-29 PROCEDURE — 99283 EMERGENCY DEPT VISIT LOW MDM: CPT

## 2021-10-29 PROCEDURE — 74011250637 HC RX REV CODE- 250/637: Performed by: EMERGENCY MEDICINE

## 2021-10-29 PROCEDURE — 74011250636 HC RX REV CODE- 250/636: Performed by: EMERGENCY MEDICINE

## 2021-10-29 RX ORDER — ACETAMINOPHEN 500 MG
1000 TABLET ORAL ONCE
Status: COMPLETED | OUTPATIENT
Start: 2021-10-30 | End: 2021-10-29

## 2021-10-29 RX ORDER — KETOROLAC TROMETHAMINE 30 MG/ML
30 INJECTION, SOLUTION INTRAMUSCULAR; INTRAVENOUS
Status: COMPLETED | OUTPATIENT
Start: 2021-10-30 | End: 2021-10-29

## 2021-10-29 RX ORDER — DEXAMETHASONE SODIUM PHOSPHATE 10 MG/ML
10 INJECTION INTRAMUSCULAR; INTRAVENOUS ONCE
Status: COMPLETED | OUTPATIENT
Start: 2021-10-30 | End: 2021-10-29

## 2021-10-29 RX ADMIN — KETOROLAC TROMETHAMINE 30 MG: 30 INJECTION, SOLUTION INTRAMUSCULAR; INTRAVENOUS at 23:40

## 2021-10-29 RX ADMIN — ACETAMINOPHEN 1000 MG: 500 TABLET ORAL at 23:40

## 2021-10-29 RX ADMIN — DEXAMETHASONE SODIUM PHOSPHATE 10 MG: 10 INJECTION, SOLUTION INTRAMUSCULAR; INTRAVENOUS at 23:41

## 2021-10-30 RX ORDER — LIDOCAINE 4 G/100G
PATCH TOPICAL
Qty: 15 PATCH | Refills: 2 | Status: SHIPPED
Start: 2021-10-30 | End: 2022-03-31

## 2021-10-30 NOTE — ED PROVIDER NOTES
EMERGENCY DEPARTMENT HISTORY AND PHYSICAL EXAM      Date: 10/29/2021  Patient Name: Kamini Castro    History of Presenting Illness     Chief Complaint   Patient presents with    Back Pain       History Provided By: Patient and EMS    HPI: Kamini Castro, 73 y.o. female   presents to the ED with cc of lower back pain. Patient complains of low back pain for last several days. Pain is been constant with fluctuating intensity from mild to severe degree with any movement aggravating the pain. No radiation of pain. No saddle numbness. No fecal or urinary incontinence. No paresthesia or motor dysfunction. Patient was seen at the emergency room 2 days ago for the same and had a multiple work-up including CTA of the abdomen. All the ER records and CTA report reviewed. Patient was prescribed Robaxin without much relief as per patient. No dysuria hematuria. No signs of GI bleeding. No abdominal pain. PCP: Janay Jacobson MD    No current facility-administered medications on file prior to encounter. Current Outpatient Medications on File Prior to Encounter   Medication Sig Dispense Refill    methocarbamoL (Robaxin) 500 mg tablet Take 1 Tablet by mouth two (2) times a day. 10 Tablet 0    rOPINIRole (REQUIP) 4 mg tab TAB TAKE 1 TABLET BY MOUTH TWO TIMES A DAY 60 Tablet 4    metFORMIN (GLUCOPHAGE) 500 mg tablet Take 1 Tab by mouth daily. 90 Tab 3    cyanocobalamin 1,000 mcg tablet Take 1,000 mcg by mouth daily. Patient states she takes 2,000 mg tablet a day      meloxicam (MOBIC) 7.5 mg tablet TAKE 1 TABLET BY MOUTH DAILY 90 Tab 3    atorvastatin (LIPITOR) 10 mg tablet TAKE 1 TABLET BY MOUTH DAILY AT BEDTIME 90 Tab 3    losartan-hydroCHLOROthiazide (HYZAAR) 100-25 mg per tablet TAKE 1 TABLET BY MOUTH DAILY 90 Tab 0    amLODIPine (NORVASC) 10 mg tablet TAKE 1 TABLET BY MOUTH DAILY 90 Tab 0    aspirin delayed-release 81 mg tablet Take  by mouth daily.          Past History     Past Medical History:  Past Medical History:   Diagnosis Date    Bacterial skin infection of trunk 9/2/2020    Colon polyp     DM (diabetes mellitus) ([facility] [address] 5/24/2011    Environmental allergies     HTN (hypertension) 5/24/2011    Hyperlipidemia 5/24/2011    Panic attacks     RLS (restless legs syndrome) 5/24/2011       Past Surgical History:  Past Surgical History:   Procedure Laterality Date    ENDOSCOPY, COLON, DIAGNOSTIC  3/20/12    OK. Rep 10 yrs. Elliot Moreno)    HX APPENDECTOMY      HX CHOLECYSTECTOMY         Family History:  Family History   Problem Relation Age of Onset    Thyroid Disease Mother        Social History:  Social History     Tobacco Use    Smoking status: Never Smoker    Smokeless tobacco: Never Used   Substance Use Topics    Alcohol use: Yes     Comment: occ    Drug use: Never       Allergies:  No Known Allergies      Review of Systems   Review of Systems   Constitutional: Negative for activity change, appetite change, chills and fever. HENT: Negative for sore throat. Eyes: Negative for discharge. Respiratory: Negative for shortness of breath. Cardiovascular: Negative for chest pain. Gastrointestinal: Negative for nausea. Endocrine: Negative for polyuria. Genitourinary: Negative for difficulty urinating. Musculoskeletal: Positive for back pain. Skin: Negative for rash. Neurological: Negative for headaches. Hematological: Negative for adenopathy. Psychiatric/Behavioral: Negative for suicidal ideas. All other systems reviewed and are negative. Physical Exam   Physical Exam  Vitals and nursing note reviewed. Constitutional:       Appearance: Normal appearance. HENT:      Head: Normocephalic and atraumatic. Nose: Nose normal.      Mouth/Throat:      Mouth: Mucous membranes are moist.   Eyes:      Conjunctiva/sclera: Conjunctivae normal.   Cardiovascular:      Rate and Rhythm: Normal rate and regular rhythm. Heart sounds: Normal heart sounds. Pulmonary:      Effort: Pulmonary effort is normal.      Breath sounds: Normal breath sounds. Abdominal:      General: Abdomen is flat. Bowel sounds are normal.      Palpations: Abdomen is soft. Tenderness: There is no guarding or rebound. Musculoskeletal:      Cervical back: Neck supple. Right lower leg: No edema. Left lower leg: No edema. Comments: Lumbar and thoracic midline nontender. Skin:     General: Skin is warm and dry. Neurological:      General: No focal deficit present. Mental Status: She is alert and oriented to person, place, and time. Psychiatric:         Mood and Affect: Mood normal.         Diagnostic Study Results     Labs -   No results found for this or any previous visit (from the past 12 hour(s)). 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 Incidental finding of 4 to 5 mm pulmonary nodule in the right upper lobe. This   can be followed up with CT chest in 12 months. Narrative:  EXAM: CTA CHEST ABD PELV W WO CONT       INDICATION: back pain and chest pain       COMPARISON: None. CONTRAST: 100 mL of Isovue-370       TECHNIQUE:     Multislice helical CT was performed for the chest, abdomen, and pelvis, from the   thoracic inlet to the iliac crest prior to and during uneventful rapid bolus   intravenous contrast administration. Contiguous axial images were reconstructed   and lung and soft tissue windows were generated. Coronal, sagittal, and MIP   reformations were generated to facilitate diagnosis. CT dose reduction was achieved through use of a standardized protocol tailored   for this examination and automatic exposure control for dose modulation. FINDINGS:   CT angiogram findings:    Aorta: Mild-to-moderate atherosclerotic disease without aneurysm or acute   finding such as dissection. Great vessels: Except for hypoplastic right vertebral artery, the great vessels   are patent. The celiac axis, SMA, UGO, and bilateral renal arteries are patent. There is   replaced right hepatic artery arising from the proximal SMA, and anatomic   variant. 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 Widely patent bilateral iliac and imaged femoral arteries. CT findings:   Lungs and airways: Pulmonary nodule up to 4 to 5 mm in the right upper lobe   (series 502, image 76). Central airways are patent. Mediastinum: No mediastinal or hilar or axillary adenopathy is appreciated. Pleural space: No pneumothorax or pleural effusion. Chest wall and bones: No aggressive bone lesion. Advanced degenerative changes   in the spine. Liver, biliary tree, and gallbladder: No focal enhancing hepatic lesion. No   biliary ductal dilatation. Prior cholecystectomy. Spleen: within normal limits. Pancreas: No mass or ductal dilatation. Kidneys and adrenals: No renal mass, obstructing calculus, or hydronephrosis. Unremarkable adrenals. Bowels: No bowel wall thickening or dilatation. Colonic diverticulosis. Appendix   is normal.       Peritoneum and retroperitoneum: No ascites or pneumoperitoneum. No   lymphadenopathy or aortic aneurysm. Pelvis: No mass or calculus. Abdominal wall and bones: No aggressive bone lesion. Advanced degenerative   changes in the spine. No abdominal wall mass. CXR Results  (Last 48 hours)               10/28/21 1025  XR ABD ACUTE W 1 V CHEST Final result    Impression:  No focal acute abnormality. Evidence for previous cholecystectomy. Suspect left kidney calculus.        Round calcification in the left upper quadrant is present on previous study and   could represent a calcified splenic artery aneurysm, upper pole kidney calculus   or other. Consider CT if needed for additional evaluation. Narrative:  4 views acute abdomen series       HISTORY: Abdominal pain       COMPARISON: Chest 1/4/2021       Lungs are clear. No pleural fluid. Heart is normal size. Aortic uncoiling. Surgical clips in the right upper quadrant. There is a round calcification in   the left upper quadrant also present on prior study. There is multiple stool and   gas in the large bowel. No small bowel distention. A small lower pole left   kidney calculus is questioned. There is a dextroconvex thoracolumbar scoliotic   curvature with spondylosis. Medical Decision Making   I am the first provider for this patient. I reviewed the vital signs, available nursing notes, past medical history, past surgical history, family history and social history. Vital Signs-Reviewed the patient's vital signs. Patient Vitals for the past 12 hrs:   Temp Pulse Resp BP SpO2   10/29/21 2320 99.8 °F (37.7 °C) 79 16 (!) 156/81 93 %       Records Reviewed:     Provider Notes (Medical Decision Making):       ED Course:   Initial assessment performed. The patients presenting problems have been discussed, and they are in agreement with the care plan formulated and outlined with them. I have encouraged them to ask questions as they arise throughout their visit. Pain improved. Patient ambulated well with a walker to the bathroom and back      PROCEDURES      Disposition: Condition stable   DC- Adult Discharges: All of the diagnostic tests were reviewed and questions answered. Diagnosis, care plan and treatment options were discussed. understand instructions and will follow up as directed. The patients results have been reviewed with them. They have been counseled regarding their diagnosis.   The patient verbally convey understanding and agreement of the signs, symptoms, diagnosis, treatment and prognosis and additionally agrees to follow up as recommended. They also agree with the care-plan and convey that all of their questions have been answered. I have also put together some discharge instructions for them that include: 1) educational information regarding their diagnosis, 2) how to care for their diagnosis at home, as well a 3) list of reasons why they would want to return to the ED prior to their follow-up appointment, should their condition change. PLAN:  1. Current Discharge Medication List      START taking these medications    Details   lidocaine 4 % patch As directed  Qty: 15 Patch, Refills: 2  Start date: 10/30/2021           2. Follow-up Information     Follow up With Specialties Details Why Contact Info    Follow up with your primary care physician  Schedule an appointment as soon as possible for a visit in 3 days As needed         Return to ED if worse     Diagnosis     Clinical Impression:   1. Strain of lumbar region, initial encounter        Please note that this dictation was completed with [employer], the computer voice recognition software. Quite often unanticipated grammatical, syntax, homophones, and other interpretive errors are inadvertently transcribed by the computer software. Please disregard these errors. Please excuse any errors that have escaped final proofreading. Thank you.

## 2021-10-30 NOTE — ED TRIAGE NOTES
Patient reports lower back pain that radiates up back and to abdomin. Also reports pain down right leg.  Patient was seen at this facility yesterday and was given robaxin which patient states gave little relief

## 2021-11-01 PROBLEM — I72.8 SPLENIC ARTERY ANEURYSM (HCC): Status: ACTIVE | Noted: 2021-11-01

## 2021-11-01 PROBLEM — R91.1 PULMONARY NODULE: Status: ACTIVE | Noted: 2021-11-01

## 2021-11-01 PROBLEM — E66.01 SEVERE OBESITY (BMI 35.0-35.9 WITH COMORBIDITY) (HCC): Status: ACTIVE | Noted: 2021-11-01

## 2021-12-03 ENCOUNTER — HOSPITAL ENCOUNTER (OUTPATIENT)
Dept: PHYSICAL THERAPY | Age: 71
Discharge: HOME | End: 2021-12-03
Payer: MEDICARE

## 2021-12-03 PROCEDURE — 97161 PT EVAL LOW COMPLEX 20 MIN: CPT

## 2021-12-03 PROCEDURE — 97110 THERAPEUTIC EXERCISES: CPT

## 2021-12-03 NOTE — PROGRESS NOTES
W . [address] [place] [address], [address] [facility]  Phone: [phone]   Fax: [phone]    PT INITIAL EVALUATION NOTE - [facility] 2-15  Plan of Care/Statement of Necessity for Physical Therapy Services  2-15    Patient Name: Rolando Diaz  Date:12/3/2021  : 1950  [x]  Patient  Verified  Provider#: [id number]  Payor: [facility] MEDICARE COMPLETE / Plan: [facility] MEDICARE COMPLETE / Product Type: Managed Care Medicare /    Referral source: Daina Webster,*   In time:900 am  Out time: 945 am  Total Treatment Time (min): 45  Total Timed Codes (min): 10  1:1 Treatment Time ( [address] only): 10   Visit #: 1      Start of Care: 12/3/21      Onset Date: 2021     Treatment Area/Diagnosis: Other low back pain [M54.59]    SUBJECTIVE  Pain Level (0-10 scale): 2                Best: 3           Worst:  9  Description: constant tightness in low back, sometimes sharp pains  Any medication changes, allergies to medications, adverse drug reactions, diagnosis change, or new procedure performed?: [] No    [x] Yes (see summary sheet for update)    Subjective:    Patient was making Halloween decorations all day in October and felt pain in her neck and low back the next day. She reports that the pain would radiate around to chest.  She saw the ER and was given muscle relaxers and 2 steroid injections B hips. She is feeling better but the pain remains. She has a h/o sciatica on the R.     Worse:  Bending, breathing, discomfort with activities, sleeping on back, steps  Better:  Stretches (hip abd), heat    PLOF: independent without back pain  Mechanism of Injury: insidious after prolonged bending  Previous Treatment/Compliance: stretches, heat  PMHx:  Diabetes, HTN, arthritis, restless leg syndrome  Surgical Hx: appendix, fatty tumors, , partial hysterectomy  Prior Hospitalization: see medical history   Medications: Verified on Patient Summary List  Work Hx: Retired  Living Situation: Lives in single story home with several steps to enter. She has HR and uses step-to pattern which is her normal.  Lives with daughter  Pt Goals: No pain  Barriers: none  Motivation: good  Substance use: none  Cognition: A & O x 3        OBJECTIVE/EXAMINATION  Posture: increased lumbar lordosis  Observation: shifts in chair during interview due to restless leg syndrome    AROM Lumbar Spine:     AROM                 Flexion     75% pain B lumbar spine             Extension     wnl     Sidebending R     wnl pain B lumbar spine     Sidebending L     wnl pain B lumbar spine            Rotation R     nt            Rotation L     nt       AROM LE:   Hip: wnl                       Knee: wnl                       Ankle: wnl            Sensation:  Grossly intact to light touch    Strength:  LE:  5/5 without back pain    Palpation: tenderness to palpation B lumbar spine and paraspinals, tightness B lumbar paraspinals    Special Tests:    Slump:  negative    TUG:  n/a    Functional Mobility:     Transfers: independent with UE assist   Gait:  Decreased step length but no difficulty   Stairs: step-to with HR      10 min Therapeutic Exercise:  [x] See flow sheet :   Rationale: increase ROM, increase strength and decrease pain to improve the patients ability to complete ADLS and functional mobility without pain. With   [] TE   [] TA   [] neuro   [] other: Patient Education: [x] Review HEP    [] Progressed/Changed HEP based on:   [] positioning   [] body mechanics   [] transfers   [] heat/ice application    [] other:      Pain Level (0-10 scale) post treatment: 2    ASSESSMENT/Key Information/Changes in Function:   Patient reports that she began having back pain in October after standing and bending over for a prolonged time making items for Halloween. She describes the pain in her low back as a constant tightness with occasional sharp pain.   Her pain level is improving with the use of meds, stretching and heat. She reports that her pain is worse with bending. On exam, her lumbar ROM is decreased with tightness with flexion/lateral flexion, she has tightness and tenderness with palpation of her lumbar spine and paraspinals. Her MMT is WNL without pain, sensation is WNL and gait is her normal.  She would benefit from skilled PT to address the above impairments so that patient is able to resume normal daily activities without pain. Problem List: pain affecting function, decrease ROM, decrease ADL/ functional abilitiies and decrease activity tolerance    Short Term Goals: To be accomplished in 4 weeks:   1. Independent with HEP. 2.  Decrease pain level to intermittent and <3/10. Long Term Goals: To be accomplished in 8 weeks:   1. No pain with bending. 2.  No pain with any ADL. Patient Goal (s): no pain    Evaluation Complexity History LOW Complexity : Zero comorbidities / personal factors that will impact the outcome / POC; Examination LOW Complexity : 1-2 Standardized tests and measures addressing body structure, function, activity limitation and / or participation in recreation  ;Presentation LOW Complexity : Stable, uncomplicated  ;Clinical Decision Making Other outcome measures [facility] 32.79%  MEDIUM  Overall Complexity Rating: LOW      Patient / Family readiness to learn indicated by: asking questions, trying to perform skills and interest  Persons(s) to be included in education: patient (P)  Barriers to Learning/Limitations: None  Patient Self Reported Health Status: fair  Rehabilitation Potential: excellent  Patient/ Caregiver education and instruction: exercises      PLAN:  Treatment Plan may include any combination of the following: Therapeutic exercise, Physical agent/modality, Manual therapy and Patient education  HEP Issued: see enclosed copy    Frequency / Duration: Patient to be seen 2 times per week for 8 weeks.     [x]  Plan of care has been reviewed with PTA    Certification Period: 12/3/21  to  3/3/22    Tala Come, PT 12/3/2021     ________________________________________________________________________    I certify that the above Therapy Services are being furnished while the patient is under my care. I agree with the treatment plan and certify that this therapy is necessary.     Physician's Signature:____________________  Date:____________Time: _________            Doron Deluca

## 2021-12-05 ENCOUNTER — APPOINTMENT (OUTPATIENT)
Dept: GENERAL RADIOLOGY | Age: 71
End: 2021-12-05
Attending: FAMILY MEDICINE
Payer: MEDICARE

## 2021-12-05 ENCOUNTER — HOSPITAL ENCOUNTER (OUTPATIENT)
Age: 71
Setting detail: OBSERVATION
Discharge: HOME | End: 2021-12-06
Attending: FAMILY MEDICINE | Admitting: INTERNAL MEDICINE
Payer: MEDICARE

## 2021-12-05 DIAGNOSIS — J96.01 ACUTE RESPIRATORY FAILURE WITH HYPOXIA (HCC): Primary | ICD-10-CM

## 2021-12-05 PROBLEM — J96.91 RESPIRATORY FAILURE WITH HYPOXIA (HCC): Status: ACTIVE | Noted: 2021-12-05

## 2021-12-05 LAB
ANION GAP SERPL CALC-SCNC: 6 MMOL/L (ref 5–15)
BASOPHILS # BLD: 0 K/UL (ref 0–0.1)
BASOPHILS NFR BLD: 0 % (ref 0–1)
BNP SERPL-MCNC: 72 PG/ML
BUN SERPL-MCNC: 9 MG/DL (ref 6–20)
BUN/CREAT SERPL: 14 (ref 12–20)
CA-I BLD-MCNC: 9.1 MG/DL (ref 8.5–10.1)
CHLORIDE SERPL-SCNC: 101 MMOL/L (ref 97–108)
CO2 SERPL-SCNC: 33 MMOL/L (ref 21–32)
CREAT SERPL-MCNC: 0.64 MG/DL (ref 0.55–1.02)
DIFFERENTIAL METHOD BLD: ABNORMAL
EOSINOPHIL # BLD: 0.2 K/UL (ref 0–0.4)
EOSINOPHIL NFR BLD: 2 % (ref 0–7)
ERYTHROCYTE [DISTWIDTH] IN BLOOD BY AUTOMATED COUNT: 13.5 % (ref 11.5–14.5)
FLUAV AG NPH QL IA: NEGATIVE
FLUBV AG NOSE QL IA: NEGATIVE
GLUCOSE SERPL-MCNC: 137 MG/DL (ref 65–100)
HCT VFR BLD AUTO: 39 % (ref 35–47)
HGB BLD-MCNC: 12.5 G/DL (ref 11.5–16)
IMM GRANULOCYTES # BLD AUTO: 0 K/UL (ref 0–0.04)
IMM GRANULOCYTES NFR BLD AUTO: 0 % (ref 0–0.5)
LACTATE SERPL-SCNC: 1.3 MMOL/L (ref 0.4–2)
LYMPHOCYTES # BLD: 2.7 K/UL (ref 0.8–3.5)
LYMPHOCYTES NFR BLD: 18 % (ref 12–49)
MCH RBC QN AUTO: 26.8 PG (ref 26–34)
MCHC RBC AUTO-ENTMCNC: 32.1 G/DL (ref 30–36.5)
MCV RBC AUTO: 83.7 FL (ref 80–99)
MONOCYTES # BLD: 1.1 K/UL (ref 0–1)
MONOCYTES NFR BLD: 7 % (ref 5–13)
NEUTS SEG # BLD: 11 K/UL (ref 1.8–8)
NEUTS SEG NFR BLD: 73 % (ref 32–75)
PLATELET # BLD AUTO: 299 K/UL (ref 150–400)
PMV BLD AUTO: 10.3 FL (ref 8.9–12.9)
POTASSIUM SERPL-SCNC: 3.9 MMOL/L (ref 3.5–5.1)
PROCALCITONIN SERPL-MCNC: <0.5 NG/ML
RBC # BLD AUTO: 4.66 M/UL (ref 3.8–5.2)
SARS-COV-2, COV2: NOT DETECTED
SODIUM SERPL-SCNC: 140 MMOL/L (ref 136–145)
SPECIMEN SOURCE: NORMAL
TROPONIN-HIGH SENSITIVITY: 12 NG/L (ref 0–51)
WBC # BLD AUTO: 15.1 K/UL (ref 3.6–11)

## 2021-12-05 PROCEDURE — 74011000250 HC RX REV CODE- 250: Performed by: FAMILY MEDICINE

## 2021-12-05 PROCEDURE — 74011250637 HC RX REV CODE- 250/637: Performed by: HOSPITALIST

## 2021-12-05 PROCEDURE — 94640 AIRWAY INHALATION TREATMENT: CPT

## 2021-12-05 PROCEDURE — G0378 HOSPITAL OBSERVATION PER HR: HCPCS

## 2021-12-05 PROCEDURE — 85025 COMPLETE CBC W/AUTO DIFF WBC: CPT

## 2021-12-05 PROCEDURE — 93005 ELECTROCARDIOGRAM TRACING: CPT

## 2021-12-05 PROCEDURE — 80048 BASIC METABOLIC PNL TOTAL CA: CPT

## 2021-12-05 PROCEDURE — 83880 ASSAY OF NATRIURETIC PEPTIDE: CPT

## 2021-12-05 PROCEDURE — 84484 ASSAY OF TROPONIN QUANT: CPT

## 2021-12-05 PROCEDURE — 87635 SARS-COV-2 COVID-19 AMP PRB: CPT

## 2021-12-05 PROCEDURE — 74011250637 HC RX REV CODE- 250/637: Performed by: FAMILY MEDICINE

## 2021-12-05 PROCEDURE — 71045 X-RAY EXAM CHEST 1 VIEW: CPT

## 2021-12-05 PROCEDURE — 87804 INFLUENZA ASSAY W/OPTIC: CPT

## 2021-12-05 PROCEDURE — 99282 EMERGENCY DEPT VISIT SF MDM: CPT

## 2021-12-05 PROCEDURE — 87040 BLOOD CULTURE FOR BACTERIA: CPT

## 2021-12-05 PROCEDURE — 83605 ASSAY OF LACTIC ACID: CPT

## 2021-12-05 PROCEDURE — 96374 THER/PROPH/DIAG INJ IV PUSH: CPT

## 2021-12-05 PROCEDURE — 74011250636 HC RX REV CODE- 250/636: Performed by: FAMILY MEDICINE

## 2021-12-05 PROCEDURE — 36415 COLL VENOUS BLD VENIPUNCTURE: CPT

## 2021-12-05 PROCEDURE — 84145 PROCALCITONIN (PCT): CPT

## 2021-12-05 PROCEDURE — 96375 TX/PRO/DX INJ NEW DRUG ADDON: CPT

## 2021-12-05 RX ORDER — BUDESONIDE AND FORMOTEROL FUMARATE DIHYDRATE 160; 4.5 UG/1; UG/1
2 AEROSOL RESPIRATORY (INHALATION)
Status: DISCONTINUED | OUTPATIENT
Start: 2021-12-05 | End: 2021-12-06 | Stop reason: HOSPADM

## 2021-12-05 RX ORDER — METFORMIN HYDROCHLORIDE 500 MG/1
500 TABLET ORAL DAILY
Status: DISCONTINUED | OUTPATIENT
Start: 2021-12-06 | End: 2021-12-06 | Stop reason: HOSPADM

## 2021-12-05 RX ORDER — SODIUM CHLORIDE 0.9 % (FLUSH) 0.9 %
5-40 SYRINGE (ML) INJECTION AS NEEDED
Status: DISCONTINUED | OUTPATIENT
Start: 2021-12-05 | End: 2021-12-06 | Stop reason: HOSPADM

## 2021-12-05 RX ORDER — AMLODIPINE BESYLATE 5 MG/1
10 TABLET ORAL DAILY
Status: DISCONTINUED | OUTPATIENT
Start: 2021-12-06 | End: 2021-12-05

## 2021-12-05 RX ORDER — ACETAMINOPHEN 325 MG/1
650 TABLET ORAL
Status: DISCONTINUED | OUTPATIENT
Start: 2021-12-05 | End: 2021-12-06 | Stop reason: HOSPADM

## 2021-12-05 RX ORDER — IPRATROPIUM BROMIDE AND ALBUTEROL SULFATE 2.5; .5 MG/3ML; MG/3ML
3 SOLUTION RESPIRATORY (INHALATION)
Status: COMPLETED | OUTPATIENT
Start: 2021-12-05 | End: 2021-12-05

## 2021-12-05 RX ORDER — ONDANSETRON 4 MG/1
4 TABLET, ORALLY DISINTEGRATING ORAL
Status: DISCONTINUED | OUTPATIENT
Start: 2021-12-05 | End: 2021-12-06 | Stop reason: HOSPADM

## 2021-12-05 RX ORDER — AMLODIPINE BESYLATE 5 MG/1
10 TABLET ORAL DAILY
Status: DISCONTINUED | OUTPATIENT
Start: 2021-12-06 | End: 2021-12-06 | Stop reason: HOSPADM

## 2021-12-05 RX ORDER — ROPINIROLE 1 MG/1
4 TABLET, FILM COATED ORAL 2 TIMES DAILY
Status: DISCONTINUED | OUTPATIENT
Start: 2021-12-05 | End: 2021-12-06 | Stop reason: HOSPADM

## 2021-12-05 RX ORDER — LEVOTHYROXINE SODIUM 50 UG/1
50 TABLET ORAL
COMMUNITY
Start: 2021-12-02 | End: 2022-04-10 | Stop reason: SDUPTHER

## 2021-12-05 RX ORDER — DOCUSATE SODIUM 100 MG/1
100 CAPSULE, LIQUID FILLED ORAL 2 TIMES DAILY
Status: DISCONTINUED | OUTPATIENT
Start: 2021-12-05 | End: 2021-12-06 | Stop reason: HOSPADM

## 2021-12-05 RX ORDER — INSULIN LISPRO 100 [IU]/ML
INJECTION, SOLUTION INTRAVENOUS; SUBCUTANEOUS
Status: DISCONTINUED | OUTPATIENT
Start: 2021-12-06 | End: 2021-12-06

## 2021-12-05 RX ORDER — LIDOCAINE 4 G/100G
1 PATCH TOPICAL EVERY 24 HOURS
Status: DISCONTINUED | OUTPATIENT
Start: 2021-12-05 | End: 2021-12-06 | Stop reason: HOSPADM

## 2021-12-05 RX ORDER — IPRATROPIUM BROMIDE 0.5 MG/2.5ML
0.5 SOLUTION RESPIRATORY (INHALATION)
Status: DISCONTINUED | OUTPATIENT
Start: 2021-12-06 | End: 2021-12-06 | Stop reason: HOSPADM

## 2021-12-05 RX ORDER — AMLODIPINE BESYLATE 5 MG/1
10 TABLET ORAL DAILY
Status: DISCONTINUED | OUTPATIENT
Start: 2021-12-05 | End: 2021-12-05

## 2021-12-05 RX ORDER — ONDANSETRON 4 MG/1
4 TABLET, ORALLY DISINTEGRATING ORAL
Status: DISCONTINUED | OUTPATIENT
Start: 2021-12-05 | End: 2021-12-05

## 2021-12-05 RX ORDER — MAGNESIUM SULFATE 100 %
4 CRYSTALS MISCELLANEOUS AS NEEDED
Status: DISCONTINUED | OUTPATIENT
Start: 2021-12-05 | End: 2021-12-06

## 2021-12-05 RX ORDER — HYDROCHLOROTHIAZIDE 25 MG/1
25 TABLET ORAL ONCE
Status: COMPLETED | OUTPATIENT
Start: 2021-12-05 | End: 2021-12-05

## 2021-12-05 RX ORDER — ALBUTEROL SULFATE 2.5 MG/.5ML
2.5 SOLUTION RESPIRATORY (INHALATION)
Status: DISCONTINUED | OUTPATIENT
Start: 2021-12-05 | End: 2021-12-06 | Stop reason: HOSPADM

## 2021-12-05 RX ORDER — DEXTROSE 50 % IN WATER (D50W) INTRAVENOUS SYRINGE
25-50 AS NEEDED
Status: DISCONTINUED | OUTPATIENT
Start: 2021-12-05 | End: 2021-12-06

## 2021-12-05 RX ORDER — ATORVASTATIN CALCIUM 10 MG/1
10 TABLET, FILM COATED ORAL
Status: DISCONTINUED | OUTPATIENT
Start: 2021-12-06 | End: 2021-12-06 | Stop reason: HOSPADM

## 2021-12-05 RX ORDER — ASPIRIN 81 MG/1
81 TABLET ORAL DAILY
Status: DISCONTINUED | OUTPATIENT
Start: 2021-12-06 | End: 2021-12-06 | Stop reason: HOSPADM

## 2021-12-05 RX ORDER — METHOCARBAMOL 500 MG/1
500 TABLET, FILM COATED ORAL
Status: DISCONTINUED | OUTPATIENT
Start: 2021-12-05 | End: 2021-12-06 | Stop reason: HOSPADM

## 2021-12-05 RX ORDER — LEVOTHYROXINE SODIUM 25 UG/1
50 TABLET ORAL
Status: DISCONTINUED | OUTPATIENT
Start: 2021-12-06 | End: 2021-12-06 | Stop reason: HOSPADM

## 2021-12-05 RX ORDER — SODIUM CHLORIDE 0.9 % (FLUSH) 0.9 %
5-40 SYRINGE (ML) INJECTION EVERY 8 HOURS
Status: DISCONTINUED | OUTPATIENT
Start: 2021-12-05 | End: 2021-12-06 | Stop reason: HOSPADM

## 2021-12-05 RX ORDER — LOSARTAN POTASSIUM 50 MG/1
100 TABLET ORAL DAILY
Status: DISCONTINUED | OUTPATIENT
Start: 2021-12-06 | End: 2021-12-06 | Stop reason: HOSPADM

## 2021-12-05 RX ORDER — LANOLIN ALCOHOL/MO/W.PET/CERES
1000 CREAM (GRAM) TOPICAL DAILY
Status: DISCONTINUED | OUTPATIENT
Start: 2021-12-06 | End: 2021-12-06 | Stop reason: HOSPADM

## 2021-12-05 RX ADMIN — LEVOFLOXACIN 750 MG: 500 TABLET, FILM COATED ORAL at 23:44

## 2021-12-05 RX ADMIN — METHYLPREDNISOLONE SODIUM SUCCINATE 125 MG: 125 INJECTION, POWDER, FOR SOLUTION INTRAMUSCULAR; INTRAVENOUS at 16:16

## 2021-12-05 RX ADMIN — IPRATROPIUM BROMIDE AND ALBUTEROL SULFATE 3 ML: .5; 2.5 SOLUTION RESPIRATORY (INHALATION) at 18:00

## 2021-12-05 RX ADMIN — ROPINIROLE HYDROCHLORIDE 4 MG: 1 TABLET, FILM COATED ORAL at 23:43

## 2021-12-05 RX ADMIN — HYDROCHLOROTHIAZIDE 25 MG: 25 TABLET ORAL at 16:17

## 2021-12-05 RX ADMIN — Medication 10 ML: at 23:46

## 2021-12-05 RX ADMIN — CEFTRIAXONE 1 G: 1 INJECTION, POWDER, FOR SOLUTION INTRAMUSCULAR; INTRAVENOUS at 18:00

## 2021-12-05 RX ADMIN — IPRATROPIUM BROMIDE AND ALBUTEROL SULFATE 3 ML: .5; 2.5 SOLUTION RESPIRATORY (INHALATION) at 16:16

## 2021-12-05 RX ADMIN — DOCUSATE SODIUM 100 MG: 100 CAPSULE ORAL at 23:43

## 2021-12-05 RX ADMIN — AZITHROMYCIN 500 MG: 500 INJECTION, POWDER, LYOPHILIZED, FOR SOLUTION INTRAVENOUS at 18:00

## 2021-12-05 RX ADMIN — IPRATROPIUM BROMIDE AND ALBUTEROL SULFATE 3 ML: .5; 2.5 SOLUTION RESPIRATORY (INHALATION) at 14:41

## 2021-12-05 NOTE — ED PROVIDER NOTES
EMERGENCY DEPARTMENT HISTORY AND PHYSICAL EXAM      Date: 12/5/2021  Patient Name: Rian Burkitt    History of Presenting Illness     Chief Complaint   Patient presents with    Cough    Nasal Congestion    Shortness of Breath       History Provided By:     HPI: Rian Burkitt, is an extremely pleasant 73 y.o. female presenting to the ED with a chief complaint of cough, nasal congestion and shortness of breath. Patient states her symptoms started several days ago. She endorses a cough productive of white sputum. She is feeling very congested. Over the last 2 days she has also developed shortness of breath. She denies any chest pain. No back pain. No fevers, chills or rigors. More fatigued than usual.  No nausea, vomiting or diaphoresis. No alleviating or aggravating factors. No edema nor weight gain. Endorses orthopnea at baseline. No history of heart failure per patient. Forgot to take antihypertensive medications today. There are no other complaints, changes, or physical findings at this time. PCP: Ismael Fitzgerald MD    No current facility-administered medications on file prior to encounter. Current Outpatient Medications on File Prior to Encounter   Medication Sig Dispense Refill    methylPREDNISolone (MEDROL DOSEPACK) 4 mg tablet Take as directed. 1 Dose Pack 0    methocarbamoL (Robaxin) 500 mg tablet Take 1 Tablet by mouth two (2) times daily as needed for Muscle Spasm(s). 30 Tablet 2    lidocaine 4 % patch As directed 15 Patch 2    rOPINIRole (REQUIP) 4 mg tab TAB TAKE 1 TABLET BY MOUTH TWO TIMES A DAY 60 Tablet 4    metFORMIN (GLUCOPHAGE) 500 mg tablet Take 1 Tab by mouth daily. 90 Tab 3    cyanocobalamin 1,000 mcg tablet Take 1,000 mcg by mouth daily.  Patient states she takes 2,000 mg tablet a day      meloxicam (MOBIC) 7.5 mg tablet TAKE 1 TABLET BY MOUTH DAILY 90 Tab 3    atorvastatin (LIPITOR) 10 mg tablet TAKE 1 TABLET BY MOUTH DAILY AT BEDTIME 90 Tab 3    losartan-hydroCHLOROthiazide (HYZAAR) 100-25 mg per tablet TAKE 1 TABLET BY MOUTH DAILY 90 Tab 0    amLODIPine (NORVASC) 10 mg tablet TAKE 1 TABLET BY MOUTH DAILY 90 Tab 0    aspirin delayed-release 81 mg tablet Take  by mouth daily. Past History     Past Medical History:  Past Medical History:   Diagnosis Date    Bacterial skin infection of trunk 9/2/2020    Colon polyp     DM (diabetes mellitus) ([address] 5/24/2011    Environmental allergies     HTN (hypertension) 5/24/2011    Hyperlipidemia 5/24/2011    Panic attacks     RLS (restless legs syndrome) 5/24/2011       Past Surgical History:  Past Surgical History:   Procedure Laterality Date    ENDOSCOPY, COLON, DIAGNOSTIC  3/20/12    OK. Rep 10 yrs. Elliot Josh)    HX APPENDECTOMY      HX CHOLECYSTECTOMY         Family History:  Family History   Problem Relation Age of Onset    Thyroid Disease Mother        Social History:  Social History     Tobacco Use    Smoking status: Never Smoker    Smokeless tobacco: Never Used   Substance Use Topics    Alcohol use: Yes     Comment: occ    Drug use: Never       Allergies:  No Known Allergies      Review of Systems     Review of Systems   Constitutional: Negative for activity change, appetite change, chills, fatigue and fever. HENT: Positive for congestion. Negative for sore throat. Eyes: Negative for photophobia and visual disturbance. Respiratory: Positive for cough and shortness of breath. Negative for wheezing. Cardiovascular: Negative for chest pain, palpitations and leg swelling. Gastrointestinal: Negative for abdominal pain, diarrhea, nausea and vomiting. Endocrine: Negative for cold intolerance and heat intolerance. Musculoskeletal: Negative for gait problem and joint swelling. Skin: Negative for color change and rash. Neurological: Negative for dizziness and headaches. Physical Exam     Physical Exam  Constitutional:       Appearance: She is well-developed. HENT:      Head: Normocephalic and atraumatic. Mouth/Throat:      Mouth: Mucous membranes are moist.      Pharynx: Oropharynx is clear. Eyes:      Conjunctiva/sclera: Conjunctivae normal.      Pupils: Pupils are equal, round, and reactive to light. Cardiovascular:      Rate and Rhythm: Normal rate and regular rhythm. Heart sounds: No murmur heard. Pulmonary:      Effort: No respiratory distress. Breath sounds: No stridor. No rales. Comments: Coarse breath sounds with expiratory wheeze, tachypnea  Dry cough during exam  Abdominal:      General: There is no distension. Tenderness: There is no abdominal tenderness. There is no rebound. Musculoskeletal:      Cervical back: Normal range of motion and neck supple. Skin:     General: Skin is warm and dry. Comments: No pitting edema   Neurological:      General: No focal deficit present. Mental Status: She is alert and oriented to person, place, and time. Psychiatric:         Mood and Affect: Mood normal.         Behavior: Behavior normal.         Lab and Diagnostic Study Results     Labs -     Recent Results (from the past 12 hour(s))   CBC WITH AUTOMATED DIFF    Collection Time: 12/05/21  2:45 PM   Result Value Ref Range    WBC 15.1 (H) 3.6 - 11.0 K/uL    RBC 4.66 3.80 - 5.20 M/uL    HGB 12.5 11.5 - 16.0 g/dL    HCT 39.0 35.0 - 47.0 %    MCV 83.7 80.0 - 99.0 FL    MCH 26.8 26.0 - 34.0 PG    MCHC 32.1 30.0 - 36.5 g/dL    RDW 13.5 11.5 - 14.5 %    PLATELET 812 044 - 523 K/uL    MPV 10.3 8.9 - 12.9 FL    NEUTROPHILS 73 32 - 75 %    LYMPHOCYTES 18 12 - 49 %    MONOCYTES 7 5 - 13 %    EOSINOPHILS 2 0 - 7 %    BASOPHILS 0 0 - 1 %    IMMATURE GRANULOCYTES 0 0.0 - 0.5 %    ABS. NEUTROPHILS 11.0 (H) 1.8 - 8.0 K/UL    ABS. LYMPHOCYTES 2.7 0.8 - 3.5 K/UL    ABS. MONOCYTES 1.1 (H) 0.0 - 1.0 K/UL    ABS. EOSINOPHILS 0.2 0.0 - 0.4 K/UL    ABS. BASOPHILS 0.0 0.0 - 0.1 K/UL    ABS. IMM.  GRANS. 0.0 0.00 - 0.04 K/UL    DF AUTOMATED METABOLIC PANEL, BASIC    Collection Time: 12/05/21  2:45 PM   Result Value Ref Range    Sodium 140 136 - 145 mmol/L    Potassium 3.9 3.5 - 5.1 mmol/L    Chloride 101 97 - 108 mmol/L    CO2 33 (H) 21 - 32 mmol/L    Anion gap 6 5 - 15 mmol/L    Glucose 137 (H) 65 - 100 mg/dL    BUN 9 6 - 20 mg/dL    Creatinine 0.64 0.55 - 1.02 mg/dL    BUN/Creatinine ratio 14 12 - 20      GFR est AA >60 >60 ml/min/1.73m2    GFR est non-AA >60 >60 ml/min/1.73m2    Calcium 9.1 8.5 - 10.1 mg/dL   TROPONIN-HIGH SENSITIVITY    Collection Time: 12/05/21  2:45 PM   Result Value Ref Range    Troponin-High Sensitivity 12 0 - 51 ng/L   NT-PRO BNP    Collection Time: 12/05/21  2:45 PM   Result Value Ref Range    NT pro-BNP 72 <125 pg/mL   COVID-19 RAPID TEST    Collection Time: 12/05/21  2:45 PM   Result Value Ref Range    Specimen source Please find results under separate order     INFLUENZA A & B AG (RAPID TEST)    Collection Time: 12/05/21  2:45 PM   Result Value Ref Range    Influenza A Antigen Negative Negative      Influenza B Antigen Negative Negative     PROCALCITONIN    Collection Time: 12/05/21  2:45 PM   Result Value Ref Range    Procalcitonin <0.50 (H) 0 ng/mL   SARS-COV-2    Collection Time: 12/05/21  2:45 PM   Result Value Ref Range    SARS-CoV-2 Not Detected Not Detected     LACTIC ACID    Collection Time: 12/05/21  3:20 PM   Result Value Ref Range    Lactic acid 1.3 0.4 - 2.0 mmol/L       Radiologic Studies -   @lastxrresult@  CT Results  (Last 48 hours)    None        CXR Results  (Last 48 hours)               12/05/21 1425  XR CHEST PORT Final result    Impression:  No acute findings. Narrative:  Shortness of breath. Comparison chest x-ray 10/28/2021. Findings: Single frontal view of the chest. Normal cardiomediastinal silhouette. No vascular congestion or pulmonary edema. The lungs are well-inflated. No   infiltrate, effusion, pneumothorax. No free air under the hemidiaphragms.    Bilateral AC joint degenerative disease and degenerative disc disease. Medical Decision Making   - I am the first provider for this patient. - I reviewed the vital signs, available nursing notes, past medical history, past surgical history, family history and social history. - Initial assessment performed. The patients presenting problems have been discussed, and they are in agreement with the care plan formulated and outlined with them. I have encouraged them to ask questions as they arise throughout their visit. Vital Signs-Reviewed the patient's vital signs. Patient Vitals for the past 12 hrs:   Temp Pulse Resp BP SpO2   12/05/21 1811 -- (!) 101 28 (!) 144/86 91 %   12/05/21 1705 -- (!) 101 18 -- 90 %   12/05/21 1703 98.9 °F (37.2 °C) -- -- -- --   12/05/21 1617 -- 92 -- (!) 163/97 --   12/05/21 1428 99.5 °F (37.5 °C) 90 18 (!) 209/112 94 %         ED Course/ Provider Notes (Medical Decision Making):     Patient presented to the emergency department with a chief complaint of cough, nasal congestion, shortness of breath. On examination the patient is nontoxic but ill-appearing. Vitals were reviewed per above. Denies history of COPD. No chest pain. EKG sinus rhythm, no STEMI. High-sensitivity troponin is 12. BNP 72.  Lactic acid within normal range. Leukocytosis 15.1. Patient feels better after DuoNeb's Solu-Medrol however still mildly tachypneic with oxygen saturation 90% on room air. COVID-19 and influenza A&B negative. Suspect patient could have some degree of undiagnosed obstructive lung disease coupled with viral URI. Case was discussed with Dr. Claudean Koh who accepted the admission. Case was discussed in detail with Dr. Kamari Villavicenico who will be assuming care of this patient at shift change until she is transferred to [facility].       Procedures   Medical Decision Makingedical Decision Making  Performed by: July Vargas DO  Procedures  None       Disposition   Disposition: Admission    Diagnosis     Clinical Impression:    1. Acute respiratory failure with hypoxia [facility])        Attestations:    Lester Siemens, DO    Please note that this dictation was completed with [employer], the computer voice recognition software. Quite often unanticipated grammatical, syntax, homophones, and other interpretive errors are inadvertently transcribed by the computer software. Please disregard these errors. Please excuse any errors that have escaped final proofreading. Thank you.

## 2021-12-05 NOTE — ED TRIAGE NOTES
Patient reports cough, sore throat,fever,pressure/tightness of chest and shortness of breath since Thursday. Reports night time fever.  Reports everyone in her house is sick

## 2021-12-06 VITALS
HEIGHT: 62 IN | TEMPERATURE: 98.3 F | SYSTOLIC BLOOD PRESSURE: 163 MMHG | RESPIRATION RATE: 18 BRPM | WEIGHT: 181 LBS | OXYGEN SATURATION: 94 % | BODY MASS INDEX: 33.31 KG/M2 | HEART RATE: 100 BPM | DIASTOLIC BLOOD PRESSURE: 83 MMHG

## 2021-12-06 LAB
25(OH)D3 SERPL-MCNC: 21.7 NG/ML (ref 30–100)
ALBUMIN SERPL-MCNC: 3.4 G/DL (ref 3.5–5)
ANION GAP SERPL CALC-SCNC: 9 MMOL/L (ref 5–15)
ATRIAL RATE: 105 BPM
BUN SERPL-MCNC: 13 MG/DL (ref 6–20)
BUN/CREAT SERPL: 14 (ref 12–20)
CA-I BLD-MCNC: 9.4 MG/DL (ref 8.5–10.1)
CALCULATED P AXIS, ECG09: 28 DEGREES
CALCULATED R AXIS, ECG10: -2 DEGREES
CALCULATED T AXIS, ECG11: 58 DEGREES
CHLORIDE SERPL-SCNC: 97 MMOL/L (ref 97–108)
CO2 SERPL-SCNC: 28 MMOL/L (ref 21–32)
CREAT SERPL-MCNC: 0.93 MG/DL (ref 0.55–1.02)
DIAGNOSIS, 93000: NORMAL
ERYTHROCYTE [DISTWIDTH] IN BLOOD BY AUTOMATED COUNT: 13.7 % (ref 11.5–14.5)
EST. AVERAGE GLUCOSE BLD GHB EST-MCNC: 180 MG/DL
GLUCOSE SERPL-MCNC: 264 MG/DL (ref 65–100)
HBA1C MFR BLD: 7.9 % (ref 4–5.6)
HCT VFR BLD AUTO: 38.3 % (ref 35–47)
HGB BLD-MCNC: 12 G/DL (ref 11.5–16)
MCH RBC QN AUTO: 25.9 PG (ref 26–34)
MCHC RBC AUTO-ENTMCNC: 31.3 G/DL (ref 30–36.5)
MCV RBC AUTO: 82.5 FL (ref 80–99)
NRBC # BLD: 0 K/UL (ref 0–0.01)
NRBC BLD-RTO: 0 PER 100 WBC
P-R INTERVAL, ECG05: 146 MS
PHOSPHATE SERPL-MCNC: 2.8 MG/DL (ref 2.6–4.7)
PLATELET # BLD AUTO: 320 K/UL (ref 150–400)
PMV BLD AUTO: 11.1 FL (ref 8.9–12.9)
POTASSIUM SERPL-SCNC: 3.6 MMOL/L (ref 3.5–5.1)
Q-T INTERVAL, ECG07: 366 MS
QRS DURATION, ECG06: 78 MS
QTC CALCULATION (BEZET), ECG08: 483 MS
RBC # BLD AUTO: 4.64 M/UL (ref 3.8–5.2)
SODIUM SERPL-SCNC: 134 MMOL/L (ref 136–145)
TSH SERPL DL<=0.05 MIU/L-ACNC: 0.49 UIU/ML (ref 0.36–3.74)
VENTRICULAR RATE, ECG03: 105 BPM
WBC # BLD AUTO: 17.2 K/UL (ref 3.6–11)

## 2021-12-06 PROCEDURE — G0378 HOSPITAL OBSERVATION PER HR: HCPCS

## 2021-12-06 PROCEDURE — 85027 COMPLETE CBC AUTOMATED: CPT

## 2021-12-06 PROCEDURE — 74011636637 HC RX REV CODE- 636/637: Performed by: HOSPITALIST

## 2021-12-06 PROCEDURE — 83036 HEMOGLOBIN GLYCOSYLATED A1C: CPT

## 2021-12-06 PROCEDURE — 84443 ASSAY THYROID STIM HORMONE: CPT

## 2021-12-06 PROCEDURE — 94760 N-INVAS EAR/PLS OXIMETRY 1: CPT

## 2021-12-06 PROCEDURE — 82306 VITAMIN D 25 HYDROXY: CPT

## 2021-12-06 PROCEDURE — 80069 RENAL FUNCTION PANEL: CPT

## 2021-12-06 PROCEDURE — 74011250637 HC RX REV CODE- 250/637: Performed by: HOSPITALIST

## 2021-12-06 PROCEDURE — 36415 COLL VENOUS BLD VENIPUNCTURE: CPT

## 2021-12-06 RX ORDER — DEXTROSE 50 % IN WATER (D50W) INTRAVENOUS SYRINGE
25-50 AS NEEDED
Status: DISCONTINUED | OUTPATIENT
Start: 2021-12-06 | End: 2021-12-06 | Stop reason: HOSPADM

## 2021-12-06 RX ORDER — BUDESONIDE AND FORMOTEROL FUMARATE DIHYDRATE 160; 4.5 UG/1; UG/1
2 AEROSOL RESPIRATORY (INHALATION) DAILY
Qty: 10.2 G | Refills: 0 | Status: SHIPPED
Start: 2021-12-06 | End: 2022-03-31 | Stop reason: ALTCHOICE

## 2021-12-06 RX ORDER — HYDROCHLOROTHIAZIDE 25 MG/1
25 TABLET ORAL DAILY
Status: DISCONTINUED | OUTPATIENT
Start: 2021-12-06 | End: 2021-12-06 | Stop reason: HOSPADM

## 2021-12-06 RX ORDER — INSULIN LISPRO 100 [IU]/ML
INJECTION, SOLUTION INTRAVENOUS; SUBCUTANEOUS
Status: DISCONTINUED | OUTPATIENT
Start: 2021-12-06 | End: 2021-12-06 | Stop reason: HOSPADM

## 2021-12-06 RX ORDER — ALBUTEROL SULFATE 90 UG/1
1 AEROSOL, METERED RESPIRATORY (INHALATION)
Qty: 18 G | Refills: 0 | Status: SHIPPED | OUTPATIENT
Start: 2021-12-06 | End: 2022-07-14

## 2021-12-06 RX ORDER — MAGNESIUM SULFATE 100 %
4 CRYSTALS MISCELLANEOUS AS NEEDED
Status: DISCONTINUED | OUTPATIENT
Start: 2021-12-06 | End: 2021-12-06 | Stop reason: HOSPADM

## 2021-12-06 RX ORDER — INSULIN LISPRO 100 [IU]/ML
5 INJECTION, SOLUTION INTRAVENOUS; SUBCUTANEOUS
Status: DISCONTINUED | OUTPATIENT
Start: 2021-12-06 | End: 2021-12-06 | Stop reason: HOSPADM

## 2021-12-06 RX ORDER — LEVOFLOXACIN 750 MG/1
750 TABLET ORAL EVERY 24 HOURS
Qty: 6 TABLET | Refills: 0 | Status: SHIPPED
Start: 2021-12-06 | End: 2021-12-13

## 2021-12-06 RX ADMIN — Medication 10 ML: at 06:57

## 2021-12-06 RX ADMIN — CYANOCOBALAMIN TAB 1000 MCG 1000 MCG: 1000 TAB at 10:06

## 2021-12-06 RX ADMIN — LOSARTAN POTASSIUM 100 MG: 50 TABLET, FILM COATED ORAL at 10:07

## 2021-12-06 RX ADMIN — HYDROCHLOROTHIAZIDE 25 MG: 25 TABLET ORAL at 10:06

## 2021-12-06 RX ADMIN — ROPINIROLE HYDROCHLORIDE 4 MG: 1 TABLET, FILM COATED ORAL at 10:07

## 2021-12-06 RX ADMIN — INSULIN LISPRO 5 UNITS: 100 INJECTION, SOLUTION INTRAVENOUS; SUBCUTANEOUS at 07:30

## 2021-12-06 RX ADMIN — ASPIRIN 81 MG: 81 TABLET, COATED ORAL at 10:06

## 2021-12-06 RX ADMIN — LEVOTHYROXINE SODIUM 50 MCG: 0.03 TABLET ORAL at 06:45

## 2021-12-06 RX ADMIN — AMLODIPINE BESYLATE 10 MG: 5 TABLET ORAL at 10:06

## 2021-12-06 RX ADMIN — DOCUSATE SODIUM 100 MG: 100 CAPSULE ORAL at 10:06

## 2021-12-06 RX ADMIN — METFORMIN HYDROCHLORIDE 500 MG: 500 TABLET ORAL at 10:06

## 2021-12-06 NOTE — DISCHARGE SUMMARY
Hospitalist Discharge Summary     Patient ID:    Boris Decker  [id number]  23 y.o.  1950    Admit date: 12/5/2021    Discharge date : 12/6/2021    Chronic Diagnoses:    Problem List as of 12/6/2021 Date Reviewed: 12/5/2021          Codes Class Noted - Resolved    Respiratory failure with hypoxia [facility]) ICD-10-CM: J96.91  ICD-9-CM: 518.81  12/5/2021 - Present        Splenic artery aneurysm ([facility] ICD-10-CM: I72.8  ICD-9-CM: 442.83  11/1/2021 - Present        Pulmonary nodule ICD-10-CM: R91.1  ICD-9-CM: 793.11  11/1/2021 - Present        Severe obesity (BMI 35.0-35.9 with comorbidity) ([facility] ICD-10-CM: E66.01, Z68.35  ICD-9-CM: 278.01, V85.35  11/1/2021 - Present        Hypertension complicating diabetes ([place] [address] ICD-10-CM: E11.59, I15.2  ICD-9-CM: 250.80, 401.9  3/6/2018 - Present        Type 2 diabetes mellitus without complication ([place] [address] MRG-04-HD: E11.9  ICD-9-CM: 250.00  7/31/2015 - Present        Encounter for long-term (current) use of other medications ICD-10-CM: Z79.899  ICD-9-CM: V58.69  2/28/2012 - Present        Hyperlipidemia ICD-10-CM: E78.5  ICD-9-CM: 272.4  5/24/2011 - Present        RLS (restless legs syndrome) ICD-10-CM: G25.81  ICD-9-CM: 333.94  5/24/2011 - Present        RESOLVED: Bacterial skin infection of trunk ICD-10-CM: L08.9, B96.89  ICD-9-CM: 686.9  9/2/2020 - 9/14/2021        RESOLVED: Essential hypertension with goal blood pressure less than 140/90 ICD-10-CM: I10  ICD-9-CM: 401.9  5/9/2016 - 6/17/2020        RESOLVED: Essential hypertension ICD-10-CM: I10  ICD-9-CM: 401.9  7/31/2015 - 5/9/2016        RESOLVED: DM (diabetes mellitus) ([facility] ICD-10-CM: E11.9  ICD-9-CM: 250.00  5/24/2011 - 7/31/2015        RESOLVED: HTN (hypertension) ICD-10-CM: I10  ICD-9-CM: 401.9  5/24/2011 - 7/31/2015          22    Final Diagnoses:    Active Problems:    Respiratory failure with hypoxia ([facility] (12/5/2021)        Reason for Hospitalization/Hospital Course:   73 y.o. female with history of diabetes mellitus, hypertension and hyperlipidemia presents to the emergency room complaining of shortness of breath associated with cough and sore throat. It started 2 days ago, getting worse, getting out of breath easily. Feels that she has fever but never checked. Associated with chills and sometimes she feels like panicky attacks palpitations. Any activity making it worse, associated with cough and tightness in the chest. Denies any nausea or vomiting, no diarrhea. Evaluation in the emergency room found with significant wheezing and shortness of breath, given breathing treatments with some improvement but still a lot of wheezing and still short of breath. She was hypoxic on arrival, required oxygen supplement that was able to be weaned off by the time she came to the floor after few hours. She thinks that she has the lung problem but never got evaluated. Never smoked but had long-term exposure of secondhand smoking. Will discharge home today with different antibiotic Levaquin, Symbicort and proair inhaler. Advised follow-up outpatient with PCP as needed. Will refer to pulmonary specialist for further evaluation of COPD. Discharge Medications:   Current Discharge Medication List      START taking these medications    Details   levoFLOXacin (LEVAQUIN) 750 mg tablet Take 1 Tablet by mouth every twenty-four (24) hours for 6 days. Qty: 6 Tablet, Refills: 0  Start date: 12/6/2021, End date: 12/12/2021      albuterol (ProAir HFA) 90 mcg/actuation inhaler Take 1 Puff by inhalation every four (4) hours as needed for Wheezing. Qty: 18 g, Refills: 0  Start date: 12/6/2021      budesonide-formoteroL (SYMBICORT) 160-4.5 mcg/actuation HFAA Take 2 Puffs by inhalation daily. Qty: 10.2 g, Refills: 0  Start date: 12/6/2021         CONTINUE these medications which have NOT CHANGED    Details   levothyroxine (SYNTHROID) 50 mcg tablet Take 50 mcg by mouth every morning.       methocarbamoL (Robaxin) 500 mg tablet Take 1 Tablet by mouth two (2) times daily as needed for Muscle Spasm(s). Qty: 30 Tablet, Refills: 2    Associated Diagnoses: Acute bilateral low back pain, unspecified whether sciatica present      rOPINIRole (REQUIP) 4 mg tab TAB TAKE 1 TABLET BY MOUTH TWO TIMES A DAY  Qty: 60 Tablet, Refills: 4      metFORMIN (GLUCOPHAGE) 500 mg tablet Take 1 Tab by mouth daily. Qty: 90 Tab, Refills: 3    Associated Diagnoses: Type 2 diabetes mellitus without complication, without long-term current use of insulin (HCC)      cyanocobalamin 1,000 mcg tablet Take 1,000 mcg by mouth daily. Patient states she takes 2,000 mg tablet a day      meloxicam (MOBIC) 7.5 mg tablet TAKE 1 TABLET BY MOUTH DAILY  Qty: 90 Tab, Refills: 3      atorvastatin (LIPITOR) 10 mg tablet TAKE 1 TABLET BY MOUTH DAILY AT BEDTIME  Qty: 90 Tab, Refills: 3      losartan-hydroCHLOROthiazide (HYZAAR) 100-25 mg per tablet TAKE 1 TABLET BY MOUTH DAILY  Qty: 90 Tab, Refills: 0      amLODIPine (NORVASC) 10 mg tablet TAKE 1 TABLET BY MOUTH DAILY  Qty: 90 Tab, Refills: 0      aspirin delayed-release 81 mg tablet Take  by mouth daily. lidocaine 4 % patch As directed  Qty: 15 Patch, Refills: 2               Follow up Care:    1. Devon Roberts MD in 1-2 weeks. Follow-up Information     Follow up With Specialties Details Why Contact Info    Devon Roberts MD Internal Medicine  As needed, If symptoms worsen [id number]      Bianka Jarrett MD Pulmonary Disease In 2 weeks Referral for new diagnosis COPD, will need PFTs [address] [facility]  [phone]              Patient Follow Up Instructions:    Activity: Activity as tolerated  Diet:  Diabetic Diet  Wound Care: None needed     Condition at Discharge:  Stable  __________________________________________________________________    Disposition  Home or Self Care  ____________________________________________________________________    Code Status:  Full Code  ___________________________________________________________________    Discharge Exam:  Patient seen and examined by me on discharge day. Pertinent Findings:  Gen:    Not in distress  Chest: Clear lungs  CVS:   Regular rhythm. No edema  Abd:  Soft, not distended, not tender  Neuro:  Alert        CONSULTATIONS: None    Significant Diagnostic Studies:   Recent Results (from the past 24 hour(s))   CBC WITH AUTOMATED DIFF    Collection Time: 12/05/21  2:45 PM   Result Value Ref Range    WBC 15.1 (H) 3.6 - 11.0 K/uL    RBC 4.66 3.80 - 5.20 M/uL    HGB 12.5 11.5 - 16.0 g/dL    HCT 39.0 35.0 - 47.0 %    MCV 83.7 80.0 - 99.0 FL    MCH 26.8 26.0 - 34.0 PG    MCHC 32.1 30.0 - 36.5 g/dL    RDW 13.5 11.5 - 14.5 %    PLATELET 300 703 - 363 K/uL    MPV 10.3 8.9 - 12.9 FL    NEUTROPHILS 73 32 - 75 %    LYMPHOCYTES 18 12 - 49 %    MONOCYTES 7 5 - 13 %    EOSINOPHILS 2 0 - 7 %    BASOPHILS 0 0 - 1 %    IMMATURE GRANULOCYTES 0 0.0 - 0.5 %    ABS. NEUTROPHILS 11.0 (H) 1.8 - 8.0 K/UL    ABS. LYMPHOCYTES 2.7 0.8 - 3.5 K/UL    ABS. MONOCYTES 1.1 (H) 0.0 - 1.0 K/UL    ABS. EOSINOPHILS 0.2 0.0 - 0.4 K/UL    ABS. BASOPHILS 0.0 0.0 - 0.1 K/UL    ABS. IMM.  GRANS. 0.0 0.00 - 0.04 K/UL    DF AUTOMATED     METABOLIC PANEL, BASIC    Collection Time: 12/05/21  2:45 PM   Result Value Ref Range    Sodium 140 136 - 145 mmol/L    Potassium 3.9 3.5 - 5.1 mmol/L    Chloride 101 97 - 108 mmol/L    CO2 33 (H) 21 - 32 mmol/L    Anion gap 6 5 - 15 mmol/L    Glucose 137 (H) 65 - 100 mg/dL    BUN 9 6 - 20 mg/dL    Creatinine 0.64 0.55 - 1.02 mg/dL    BUN/Creatinine ratio 14 12 - 20      GFR est AA >60 >60 ml/min/1.73m2    GFR est non-AA >60 >60 ml/min/1.73m2    Calcium 9.1 8.5 - 10.1 mg/dL   TROPONIN-HIGH SENSITIVITY    Collection Time: 12/05/21  2:45 PM   Result Value Ref Range    Troponin-High Sensitivity 12 0 - 51 ng/L   NT-PRO BNP    Collection Time: 12/05/21  2:45 PM   Result Value Ref Range    NT pro-BNP 72 <125 pg/mL   COVID-19 RAPID TEST    Collection Time: 12/05/21  2:45 PM   Result Value Ref Range    Specimen source Please find results under separate order     INFLUENZA A & B AG (RAPID TEST)    Collection Time: 12/05/21  2:45 PM   Result Value Ref Range    Influenza A Antigen Negative Negative      Influenza B Antigen Negative Negative     PROCALCITONIN    Collection Time: 12/05/21  2:45 PM   Result Value Ref Range    Procalcitonin <0.50 (H) 0 ng/mL   SARS-COV-2    Collection Time: 12/05/21  2:45 PM   Result Value Ref Range    SARS-CoV-2 Not Detected Not Detected     LACTIC ACID    Collection Time: 12/05/21  3:20 PM   Result Value Ref Range    Lactic acid 1.3 0.4 - 2.0 mmol/L   CBC W/O DIFF    Collection Time: 12/06/21  6:59 AM   Result Value Ref Range    WBC 17.2 (H) 3.6 - 11.0 K/uL    RBC 4.64 3.80 - 5.20 M/uL    HGB 12.0 11.5 - 16.0 g/dL    HCT 38.3 35.0 - 47.0 %    MCV 82.5 80.0 - 99.0 FL    MCH 25.9 (L) 26.0 - 34.0 PG    MCHC 31.3 30.0 - 36.5 g/dL    RDW 13.7 11.5 - 14.5 %    PLATELET 253 630 - 394 K/uL    MPV 11.1 8.9 - 12.9 FL    NRBC 0.0 0.0  WBC    ABSOLUTE NRBC 0.00 0.00 - 0.01 K/uL   RENAL FUNCTION PANEL    Collection Time: 12/06/21  6:59 AM   Result Value Ref Range    Sodium 134 (L) 136 - 145 mmol/L    Potassium 3.6 3.5 - 5.1 mmol/L    Chloride 97 97 - 108 mmol/L    CO2 28 21 - 32 mmol/L    Anion gap 9 5 - 15 mmol/L    Glucose 264 (H) 65 - 100 mg/dL    BUN 13 6 - 20 mg/dL    Creatinine 0.93 0.55 - 1.02 mg/dL    BUN/Creatinine ratio 14 12 - 20      GFR est AA >60 >60 ml/min/1.73m2    GFR est non-AA 60 (L) >60 ml/min/1.73m2    Calcium 9.4 8.5 - 10.1 mg/dL    Phosphorus 2.8 2.6 - 4.7 mg/dL    Albumin 3.4 (L) 3.5 - 5.0 g/dL   TSH 3RD GENERATION    Collection Time: 12/06/21  6:59 AM   Result Value Ref Range    TSH 0.49 0.36 - 3.74 uIU/mL   VITAMIN D, 25 HYDROXY    Collection Time: 12/06/21  6:59 AM   Result Value Ref Range    Vitamin D 25-Hydroxy 21.7 (L) 30 - 100 ng/mL     XR CHEST PORT   Final Result   No acute findings.              Time spent in direct and indirect care including coordination of services: Greater than 35 minutes    Signed:  Genesis Arceo NP  12/6/2021  12:58 PM

## 2021-12-06 NOTE — PROGRESS NOTES
Patient reviewed and signed discharge instructions related to new medications at requested pharmacy, home medication updates, follow up appts.   Removed IV and telemetry

## 2021-12-06 NOTE — H&P
History and Physical              Subjective :   Chief Complaint : Cough associated with sore throat, severe shortness of breath. Source of information : Patient, ED provider. History of present illness:   73 y.o. female with history of diabetes mellitus, hypertension and hyperlipidemia presents to the emergency room complaining of shortness of breath associated with cough and sore throat. It started 2 days ago, getting worse, getting out of breath easily. Feels that she has fever but never checked. Associated with chills and sometimes she feels like panicky attacks palpitations. Any activity making it worse, associated with cough and tightness in the chest. Denies any nausea or vomiting, no diarrhea. Evaluation in the emergency room found with significant wheezing and shortness of breath, given breathing treatments with some improvement but still a lot of wheezing and still short of breath. She was hypoxic on arrival, required oxygen supplement that was able to be weaned off by the time she came to the floor after few hours. She thinks that she has the lung problem but never got evaluated. Never smoked but had long-term exposure of secondhand smoking. Past Medical History:   Diagnosis Date    Bacterial skin infection of trunk 9/2/2020    Colon polyp     DM (diabetes mellitus) ([facility] [address] 5/24/2011    Environmental allergies     HTN (hypertension) 5/24/2011    Hyperlipidemia 5/24/2011    Panic attacks     RLS (restless legs syndrome) 5/24/2011     Past Surgical History:   Procedure Laterality Date    ENDOSCOPY, COLON, DIAGNOSTIC  3/20/12    OK. Rep 10 yrs.  [place] [employer])    HX APPENDECTOMY      HX CHOLECYSTECTOMY       Family History   Problem Relation Age of Onset    Thyroid Disease Mother       Social History     Tobacco Use    Smoking status: Never Smoker    Smokeless tobacco: Never Used    Tobacco comment: has second hand smoking exposure for long time over the years   Substance Use Topics  Alcohol use: Yes     Comment: occ       Prior to Admission medications    Medication Sig Start Date End Date Taking? Authorizing Provider   levothyroxine (SYNTHROID) 50 mcg tablet Take 50 mcg by mouth every morning. 12/2/21   Provider, Historical   methocarbamoL (Robaxin) 500 mg tablet Take 1 Tablet by mouth two (2) times daily as needed for Muscle Spasm(s). 11/1/21   Faustino Hancock MD   lidocaine 4 % patch As directed 10/30/21   Shantanu Charles MD   rOPINIRole (REQUIP) 4 mg tab TAB TAKE 1 TABLET BY MOUTH TWO TIMES A DAY 8/18/21   Faustino Hancock MD   metFORMIN (GLUCOPHAGE) 500 mg tablet Take 1 Tab by mouth daily. 1/4/21   Faustino Hancock MD   cyanocobalamin 1,000 mcg tablet Take 1,000 mcg by mouth daily. Patient states she takes 2,000 mg tablet a day    Provider, Historical   meloxicam (MOBIC) 7.5 mg tablet TAKE 1 TABLET BY MOUTH DAILY 6/17/20   Faustino Hancock MD   atorvastatin (LIPITOR) 10 mg tablet TAKE 1 TABLET BY MOUTH DAILY AT BEDTIME 3/4/20   Faustino Hancock MD   losartan-hydroCHLOROthiazide [facility]) 100-25 mg per tablet TAKE 1 TABLET BY MOUTH DAILY 3/4/20   Faustino Hancock MD   amLODIPine (NORVASC) 10 mg tablet TAKE 1 TABLET BY MOUTH DAILY 3/4/20   Faustino Hancock MD   aspirin delayed-release 81 mg tablet Take  by mouth daily. Provider, Historical     No Known Allergies          Review of Systems:  Constitutional: Appetite is good, denies weight loss, no fever, no chills, no night sweats. Eye: No recent visual disturbances, no discharge, no double vision. Ear/nose/mouth/throat : No hearing disturbance, no ear pain, +++ nasal congestion, ++ postnasal drainage, ++ sore throat, no trouble swallowing. Respiratory : +++ trouble breathing, ++ cough, +++ shortness of breath, no hemoptysis, ++ wheezing. Cardiovascular : No chest pain, + palpitation, has orthopnea at baseline,  no peripheral edema.   Gastrointestinal : No nausea, no vomiting,  No abdominal pain. Genitourinary : No dysuria, no hematuria,  No incontinence. Lymphatics : No swollen glands -Neck, axillary, inguinal.  Endocrine : No excessive thirst, No polyuria . Immunologic :  No seasonal allergies. Musculoskeletal : No joint swelling, No pain, No effusion,. Integumentary : No rash, No pruritus, No ecchymosis. Hematology : No petechiae, No easy bruising,  No tendency to bleed easy. Neurology : Denies change in mental status, , No headache. History of restless leg syndrome on treatment  Psychiatric : No mood swings, No anxiety, No depression. Vitals:     Patient Vitals for the past 12 hrs:   Temp Pulse Resp BP SpO2   12/05/21 2049 98.2 °F (36.8 °C) 97 18 (!) 163/78 95 %   12/05/21 1937 -- (!) 102 27 (!) 157/86 94 %   12/05/21 1811 -- (!) 101 28 (!) 144/86 91 %   12/05/21 1705 -- (!) 101 18 -- 90 %   12/05/21 1703 98.9 °F (37.2 °C) -- -- -- --   12/05/21 1617 -- 92 -- (!) 163/97 --   12/05/21 1428 99.5 °F (37.5 °C) 90 18 (!) 209/112 94 %       Physical Exam:   General : Obese, looks comfortable, no respiratory distress noted. HEENT : PERRLA, normal oral mucosa, atraumatic normocephalic, Normal ear and nose. Neck : Supple, no JVD, no masses noted, no carotid bruit. Lungs : Breath sounds with moderate air entry bilaterally, decreased air movement, decreased breath sounds at bases. Occasional wheezing noted, prolonged expirations. CVS : Rhythm rate regular, S1+, S2+, no murmur or gallop. Abdomen : Soft, nontender,  bowel sounds active. Extremities : No edema noted,  pedal pulses palpable. Musculoskeletal : Fair range of motion, no joint swelling or effusion, muscle tone and power appears normal.   Skin : Moist, warm,  no pathological rash. Lymphatic : No cervical lymphadenopathy. Neurological : Awake, alert, oriented to time place person. Psychiatric : Mood and affect appears appropriate to the situation.          Data Review:   Recent Results (from the past 24 hour(s))   CBC WITH AUTOMATED DIFF    Collection Time: 12/05/21  2:45 PM   Result Value Ref Range    WBC 15.1 (H) 3.6 - 11.0 K/uL    RBC 4.66 3.80 - 5.20 M/uL    HGB 12.5 11.5 - 16.0 g/dL    HCT 39.0 35.0 - 47.0 %    MCV 83.7 80.0 - 99.0 FL    MCH 26.8 26.0 - 34.0 PG    MCHC 32.1 30.0 - 36.5 g/dL    RDW 13.5 11.5 - 14.5 %    PLATELET 760 913 - 410 K/uL    MPV 10.3 8.9 - 12.9 FL    NEUTROPHILS 73 32 - 75 %    LYMPHOCYTES 18 12 - 49 %    MONOCYTES 7 5 - 13 %    EOSINOPHILS 2 0 - 7 %    BASOPHILS 0 0 - 1 %    IMMATURE GRANULOCYTES 0 0.0 - 0.5 %    ABS. NEUTROPHILS 11.0 (H) 1.8 - 8.0 K/UL    ABS. LYMPHOCYTES 2.7 0.8 - 3.5 K/UL    ABS. MONOCYTES 1.1 (H) 0.0 - 1.0 K/UL    ABS. EOSINOPHILS 0.2 0.0 - 0.4 K/UL    ABS. BASOPHILS 0.0 0.0 - 0.1 K/UL    ABS. IMM.  GRANS. 0.0 0.00 - 0.04 K/UL    DF AUTOMATED     METABOLIC PANEL, BASIC    Collection Time: 12/05/21  2:45 PM   Result Value Ref Range    Sodium 140 136 - 145 mmol/L    Potassium 3.9 3.5 - 5.1 mmol/L    Chloride 101 97 - 108 mmol/L    CO2 33 (H) 21 - 32 mmol/L    Anion gap 6 5 - 15 mmol/L    Glucose 137 (H) 65 - 100 mg/dL    BUN 9 6 - 20 mg/dL    Creatinine 0.64 0.55 - 1.02 mg/dL    BUN/Creatinine ratio 14 12 - 20      GFR est AA >60 >60 ml/min/1.73m2    GFR est non-AA >60 >60 ml/min/1.73m2    Calcium 9.1 8.5 - 10.1 mg/dL   TROPONIN-HIGH SENSITIVITY    Collection Time: 12/05/21  2:45 PM   Result Value Ref Range    Troponin-High Sensitivity 12 0 - 51 ng/L   NT-PRO BNP    Collection Time: 12/05/21  2:45 PM   Result Value Ref Range    NT pro-BNP 72 <125 pg/mL   COVID-19 RAPID TEST    Collection Time: 12/05/21  2:45 PM   Result Value Ref Range    Specimen source Please find results under separate order     INFLUENZA A & B AG (RAPID TEST)    Collection Time: 12/05/21  2:45 PM   Result Value Ref Range    Influenza A Antigen Negative Negative      Influenza B Antigen Negative Negative     PROCALCITONIN    Collection Time: 12/05/21  2:45 PM   Result Value Ref Range    Procalcitonin <0.50 (H) 0 ng/mL   SARS-COV-2    Collection Time: 12/05/21  2:45 PM   Result Value Ref Range    SARS-CoV-2 Not Detected Not Detected     LACTIC ACID    Collection Time: 12/05/21  3:20 PM   Result Value Ref Range    Lactic acid 1.3 0.4 - 2.0 mmol/L       Radiologic Studies :   CT Results  (Last 48 hours)    None        CXR Results  (Last 48 hours)               12/05/21 1425  XR CHEST PORT Final result    Impression:  No acute findings. Narrative:  Shortness of breath. Comparison chest x-ray 10/28/2021. Findings: Single frontal view of the chest. Normal cardiomediastinal silhouette. No vascular congestion or pulmonary edema. The lungs are well-inflated. No   infiltrate, effusion, pneumothorax. No free air under the hemidiaphragms. Bilateral AC joint degenerative disease and degenerative disc disease. Assessment and Plan :     Exacerbation of chronic obstructive pulmonary disease: Started on treatment with the DuoNeb, steroid inhalers and she was given IV steroids. We'll keep her on oral steroid and monitor. Requested pulmonary consultation    Acute respiratory failure with hypoxia: Secondary to above, was on oxygen, improving with treatment    Benign essential hypertension: On hydrochlorothiazide, amlodipine, Cozaar which we will continue    Diabetes mellitus type 2: Only on small dose of Metformin. Well-controlled blood sugars at this time. Due to steroids she may develop hyperglycemia, started on Accu-Cheks with sliding scale     Admitted to medical floor for observation, full CODE STATUS, home medications reviewed and verified with patient. No DVT prophylaxis indicated at this time. She has advanced medical directives on chart. CC : Flor Hernandez MD  Signed By: Dorie Hess MD     December 5, 2021      This dictation was done by dragon, computer voice recognition software.   Often unanticipated grammatical, syntax, [place] phones and other interpretive errors are inadvertently transcribed. Please excuse errors that have escaped final proofreading.

## 2021-12-06 NOTE — PROGRESS NOTES
Reason for Admission:  Respiratory failure with hypoxia                     RUR Score:     N/A - OBS                Plan for utilizing home health:     No     PCP: First and Last name:  Kayla Castleman, MD     Name of Practice:    Are you a current patient: Yes/No: Yes   Approximate date of last visit: October   Can you participate in a virtual visit with your PCP:                     Current Advanced Directive/Advance Care Plan: Full Code      Healthcare Decision Maker:   Click here to complete [address] including selection of the Healthcare Decision Maker Relationship (ie \"Primary\")           Patient indicated she does not have a medical POA in place. Pt's children (son and daughter) would be the decision makers). Transition of Care Plan:                      Cm met with pt and pt's son at the bedside to complete discharge assessment and present Medicare Outpatient Observation Notice. Cm verified home address and emergency contact - [address] (daughter). Pt reports her daughter lives with her. Pt ambulates without DME and is not current with home health. Observation notice provided in writing to patient and/or caregiver as well as verbal explanation of the policy. Patients who are in outpatient status also receive the Observation notice. Patient has received notice and or patient representative has received via secure email, fax, or certified mail based on patient representative's preference.

## 2021-12-06 NOTE — PROGRESS NOTES
Patient arrived via stretcher with 2 attendants. Patient alert and oriented X4 Patient ambulates to and from the bathroom. Patient going to be on telemetry box 72, when she finishes with her shower. Patient with active bowel sounds. Patient with expiratory wheezing throughout upper lobes, and diminished expiratory wheezing in the lower lobes.

## 2021-12-07 ENCOUNTER — PATIENT OUTREACH (OUTPATIENT)
Dept: CASE MANAGEMENT | Age: 71
End: 2021-12-07

## 2021-12-07 NOTE — PROGRESS NOTES
21 at 1:49pm:  Care Transitions Outreach Attempt    Call within 2 business days of discharge: Yes   Attempted to reach patient for transitions of care follow up. Unable to reach patient. Patient does not have a current PHI form scanned into her chart at this time. Patient: Ramon Cassette Patient : 1950 MRN: [id number]    Last Discharge [address]       Complaint Diagnosis Description Type Department Provider    21 Cough; Nasal Congestion; Shortness of Breath Acute respiratory failure with hypoxia [facility]) ED to Hosp-Admission (Discharged) (ADMIT) Rufina Shabazz MD; Dwight Knutson... Was this an external facility discharge? No     Noted following upcoming appointments from discharge chart review:   [address] follow up appointment(s):   Future Appointments   Date Time Provider [place] Crespo   12/10/2021  1:00 PM ANITA Lal [facility]   2022  3:00 PM Devon Roberts MD [facility]     Non-[facility] follow up appointment(s): Per discharge summary, patient has pulmonary follow-up appointment scheduled with Dr. Wale Lawler on 1-3-2022.         21 at 8:07pm:  Care Transitions Nurse (CTN) made second attempt to reach patient by phone for transitions of care follow-up call. Unable to reach patient. 'Unable to Reach' by phone letter sent to patient. Patient currently has AFSANE[facility] appointment scheduled with Dr. Dipak Fontana on 2021. CTN will continue to monitor.

## 2021-12-07 NOTE — Clinical Note
Please see patient outreach dated 12-7-21. Unable to reach patient by phone for transitions of care follow-up call.

## 2021-12-07 NOTE — LETTER
12/8/2021 8:12 PM    Ms. Celeste Howe  [address] [facility] [address]      Dear Celeste Howe:    My name is Roma Shultz and I am a Care Transition Nurse who partners with your provider, Dr. Janki Perez, to improve patients' health. I've been trying to reach you via phone to let you know that, as a member of your care team, I will work with other providers involved in your care, offer education for your specific health conditions, and connect you with more resources as needed. This program is designed to provide you with the opportunity to have a ([address] [facility]/[address]) care manager partner with you for the following situations:     1) if you come home from the hospital or emergency room   2) to help manage your disease   3) when you would like assistance coordinating services or appointments    This added support is provided at no additional cost to you. My primary focus is to help you achieve specific goals and improve your health. Please call me at [phone] to further discuss how I can support your health care needs.     Sincerely,  Roma Shultz RN

## 2021-12-10 ENCOUNTER — APPOINTMENT (OUTPATIENT)
Dept: PHYSICAL THERAPY | Age: 71
End: 2021-12-10
Payer: MEDICARE

## 2021-12-12 LAB
BACTERIA SPEC CULT: NORMAL
SPECIAL REQUESTS,SREQ: NORMAL

## 2021-12-13 PROBLEM — E11.22 TYPE 2 DIABETES MELLITUS WITH CHRONIC KIDNEY DISEASE (HCC): Status: ACTIVE | Noted: 2021-12-13

## 2021-12-17 ENCOUNTER — HOSPITAL ENCOUNTER (OUTPATIENT)
Dept: PHYSICAL THERAPY | Age: 71
Discharge: HOME | End: 2021-12-17
Payer: MEDICARE

## 2021-12-17 PROCEDURE — 97110 THERAPEUTIC EXERCISES: CPT

## 2021-12-22 ENCOUNTER — HOSPITAL ENCOUNTER (OUTPATIENT)
Dept: PHYSICAL THERAPY | Age: 71
Discharge: HOME | End: 2021-12-22
Payer: MEDICARE

## 2021-12-22 PROCEDURE — 97110 THERAPEUTIC EXERCISES: CPT

## 2021-12-22 NOTE — PROGRESS NOTES
PT DAILY TREATMENT NOTE - [facility] 2-15    Patient Name: Kamini Castro  Date:2021  : 1950  [x]  Patient  Verified  Payor: Lorene Toledo / Plan: [employer] MEDICARE COMPLETE / Product Type: Managed Care Medicare /    In time: 110 pm  Out time:  200 pm pm  Total Treatment Time (min): 50  Total Timed Codes (min): 40  1:1 Treatment Time ( [address] only): 40   Visit #:  3    Treatment Area: Other low back pain [M54.59]    SUBJECTIVE  Pain Level (0-10 scale): 0  Any medication changes, allergies to medications, adverse drug reactions, diagnosis change, or new procedure performed?: [x] No    [] Yes (see summary sheet for update)  Subjective functional status/changes:   [] No changes reported  No real pain today. Doing well. OBJECTIVE  40 min Therapeutic Exercise:  [] See flow sheet :   Rationale: increase ROM, increase strength and decrease pain to improve the patients ability to complete ADLs and have pain free functional mobility      With   [x] TE   [] TA   [] Neuro   [] SC   [] other: Patient Education: [x] Review HEP    [] Progressed/Changed HEP based on:   [] positioning   [] body mechanics   [] transfers   [] heat/ice application    [] other:      Other Objective/Functional Measures: Moves about the clinic well. No complaints with exercise and no apparent difficulties. Pain Level (0-10 scale) post treatment: 0    ASSESSMENT/Changes in Function:   Did well with all exercises and moved about the clinic independently. Reported no discomfort at the conclusion of her visit.     Patient will continue to benefit from skilled PT services to modify and progress therapeutic interventions, address ROM deficits, address strength deficits, analyze and cue movement patterns and analyze and modify body mechanics/ergonomics to attain remaining goals.      [x]  See Plan of Care  []  See progress note/recertification  []  See Discharge Summary         Progress towards goals / Updated goals:  Short Term Goals: To be accomplished in 4 weeks:              1.  Independent with HEP. Progressing towards              2.  Decrease pain level to intermittent and <3/10. Progressing towards  Long Term Goals: To be accomplished in 8 weeks:              1.  No pain with bending. 2.  No pain with any ADL.   Patient Goal (s): no pain      PLAN  [x]  Upgrade activities as tolerated     [x]  Continue plan of care  []  Update interventions per flow sheet       []  Discharge due to:_  []  Other:_      Ana Giles, PT 12/22/2021

## 2021-12-29 ENCOUNTER — HOSPITAL ENCOUNTER (OUTPATIENT)
Dept: PHYSICAL THERAPY | Age: 71
Discharge: HOME | End: 2021-12-29
Payer: MEDICARE

## 2021-12-29 PROCEDURE — 97110 THERAPEUTIC EXERCISES: CPT

## 2021-12-29 NOTE — PROGRESS NOTES
PT DAILY TREATMENT NOTE - [facility] 2-15    Patient Name: Reji Quintero  Date:2021  : 1950  [x]  Patient  Verified  Payor: [employer] / Plan: [facility] MEDICARE COMPLETE / Product Type: Managed Care Medicare /    In time: 315 pm  Out time:  355 pm  Total Treatment Time (min): 40  Total Timed Codes (min): 40  1:1 Treatment Time ( [address] only): 35   Visit #:  4    Treatment Area: Other low back pain [M54.59]    SUBJECTIVE  Pain Level (0-10 scale): 0  Any medication changes, allergies to medications, adverse drug reactions, diagnosis change, or new procedure performed?: [x] No    [] Yes (see summary sheet for update)  Subjective functional status/changes:   [] No changes reported  No real pain today. Doing well. 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 OBJECTIVE  40 min Therapeutic Exercise:  [] See flow sheet :   Rationale: increase ROM, increase strength and decrease pain to improve the patients ability to complete ADLs and have pain free functional mobility      With   [x] TE   [] TA   [] Neuro   [] SC   [] other: Patient Education: [x] Review HEP    [] Progressed/Changed HEP based on:   [] positioning   [] body mechanics   [] transfers   [] heat/ice application    [] other:      Other Objective/Functional Measures: Moves about the clinic well. No complaints with exercise and no apparent difficulties. Pain Level (0-10 scale) post treatment: 0    ASSESSMENT/Changes in Function:   Did well with all exercises and moved about the clinic independently. Reported no discomfort at the conclusion of her visit. Wants to try 1-2 weeks without coming to PT as she thinks she is ready for discharge but due to starting the new job, she is not sure how that will affect her back.   If she has no difficulties then we will discharge her from PT as she has met goals as of today.     Patient will continue to benefit from skilled PT services to modify and progress therapeutic interventions, address ROM deficits, address strength deficits, analyze and cue movement patterns and analyze and modify body mechanics/ergonomics to attain remaining goals. [x]  See Plan of Care  [x]  See progress note/recertification  []  See Discharge Summary         Progress towards goals / Updated goals:  Short Term Goals: To be accomplished in 4 weeks:              1.  Independent with HEP. MET              2.  Decrease pain level to intermittent and <3/10. MET  Long Term Goals: To be accomplished in 8 weeks:              1.  No pain with bending. MET              2.  No pain with any ADL. MET  Patient Goal (s): no pain      PLAN  [x]  Upgrade activities as tolerated     [x]  Continue plan of care  []  Update interventions per flow sheet       [x]  Discharge due to:_goals met is she does not have issues within the next few weeks after starting new job.   []  Other:_      Manual Braydon, PT 12/29/2021

## 2021-12-29 NOTE — PROGRESS NOTES
PTPROGRESS NOTE - [facility] 2-15    Patient Name: Kady Ervin  Date:2021  : 1950  Visit #:  4    Treatment Area: Other low back pain [M54.59]    SUBJECTIVE  Pain Level (0-10 scale): 0  No real pain today. Doing well. 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 OBJECTIVE  Other Objective/Functional Measures: Moves about the clinic well. No complaints with exercise and no apparent difficulties. Pain Level (0-10 scale) post treatment: 0    ASSESSMENT/Changes in Function:   Did well with all exercises and moved about the clinic independently. Reported no discomfort at the conclusion of her visit. Wants to try 1-2 weeks without coming to PT as she thinks she is ready for discharge but due to starting the new job, she is not sure how that will affect her back. If she has no difficulties then we will discharge her from PT as she has met goals as of today.     Patient will continue to benefit from skilled PT services to modify and progress therapeutic interventions, address ROM deficits, address strength deficits, analyze and cue movement patterns and analyze and modify body mechanics/ergonomics to attain remaining goals. [x]  See Plan of Care  [x]  See progress note/recertification  []  See Discharge Summary         Progress towards goals / Updated goals:  Short Term Goals: To be accomplished in 4 weeks:              1.  Independent with HEP. MET              2.  Decrease pain level to intermittent and <3/10. MET  Long Term Goals: To be accomplished in 8 weeks:              1.  No pain with bending. MET              2.  No pain with any ADL. MET  Patient Goal (s): no pain      PLAN  [x]  Upgrade activities as tolerated     [x]  Continue plan of care  []  Update interventions per flow sheet       [x]  Discharge due to:_goals met is she does not have issues within the next few weeks after starting new job.   []  Other:_      Gurwinder Wells, PT 2021

## 2022-01-13 ENCOUNTER — TRANSCRIBE ORDER (OUTPATIENT)
Dept: SCHEDULING | Age: 72
End: 2022-01-13

## 2022-01-13 DIAGNOSIS — R91.1 LUNG NODULE: Primary | ICD-10-CM

## 2022-02-25 ENCOUNTER — TRANSCRIBE ORDER (OUTPATIENT)
Dept: SCHEDULING | Age: 72
End: 2022-02-25

## 2022-02-25 DIAGNOSIS — N63.0 BREAST LUMP: Primary | ICD-10-CM

## 2022-02-25 DIAGNOSIS — N64.52 NIPPLE DISCHARGE: ICD-10-CM

## 2022-03-01 ENCOUNTER — TRANSCRIBE ORDER (OUTPATIENT)
Dept: SCHEDULING | Age: 72
End: 2022-03-01

## 2022-03-01 DIAGNOSIS — N64.52 NIPPLE DISCHARGE: ICD-10-CM

## 2022-03-01 DIAGNOSIS — N63.0 BREAST LUMP: Primary | ICD-10-CM

## 2022-03-09 ENCOUNTER — HOSPITAL ENCOUNTER (OUTPATIENT)
Dept: MAMMOGRAPHY | Age: 72
Discharge: HOME | End: 2022-03-09
Attending: INTERNAL MEDICINE
Payer: MEDICARE

## 2022-03-09 DIAGNOSIS — N63.0 BREAST LUMP: ICD-10-CM

## 2022-03-09 DIAGNOSIS — N64.52 NIPPLE DISCHARGE: ICD-10-CM

## 2022-03-09 DIAGNOSIS — N63.11 BREAST LUMP ON RIGHT SIDE AT 11 O'CLOCK POSITION: ICD-10-CM

## 2022-03-09 PROCEDURE — 76642 ULTRASOUND BREAST LIMITED: CPT

## 2022-03-09 PROCEDURE — 77062 BREAST TOMOSYNTHESIS BI: CPT

## 2022-03-18 PROBLEM — I72.8 SPLENIC ARTERY ANEURYSM (HCC): Status: ACTIVE | Noted: 2021-11-01

## 2022-03-18 PROBLEM — R91.1 PULMONARY NODULE: Status: ACTIVE | Noted: 2021-11-01

## 2022-03-19 PROBLEM — I10 HYPERTENSION COMPLICATING DIABETES (HCC): Status: ACTIVE | Noted: 2018-03-06

## 2022-03-19 PROBLEM — E11.59 HYPERTENSION COMPLICATING DIABETES (HCC): Status: ACTIVE | Noted: 2018-03-06

## 2022-03-19 PROBLEM — E11.9 HYPERTENSION COMPLICATING DIABETES (HCC): Status: ACTIVE | Noted: 2018-03-06

## 2022-03-19 PROBLEM — E66.01 SEVERE OBESITY (BMI 35.0-35.9 WITH COMORBIDITY) (HCC): Status: ACTIVE | Noted: 2021-11-01

## 2022-03-19 PROBLEM — E11.22 TYPE 2 DIABETES MELLITUS WITH CHRONIC KIDNEY DISEASE (HCC): Status: ACTIVE | Noted: 2021-12-13

## 2022-03-19 PROBLEM — I15.2 HYPERTENSION COMPLICATING DIABETES (HCC): Status: ACTIVE | Noted: 2018-03-06

## 2022-03-20 PROBLEM — J96.91 RESPIRATORY FAILURE WITH HYPOXIA (HCC): Status: ACTIVE | Noted: 2021-12-05

## 2022-03-31 PROBLEM — J44.9 CHRONIC OBSTRUCTIVE PULMONARY DISEASE (HCC): Status: ACTIVE | Noted: 2022-03-31

## 2022-03-31 PROBLEM — J96.91 RESPIRATORY FAILURE WITH HYPOXIA (HCC): Status: RESOLVED | Noted: 2021-12-05 | Resolved: 2022-03-31

## 2022-05-23 ENCOUNTER — HOSPITAL ENCOUNTER (OUTPATIENT)
Dept: CT IMAGING | Age: 72
Discharge: HOME | End: 2022-05-23
Attending: INTERNAL MEDICINE
Payer: MEDICARE

## 2022-05-23 DIAGNOSIS — R91.1 LUNG NODULE: ICD-10-CM

## 2022-05-23 PROCEDURE — 71250 CT THORAX DX C-: CPT

## 2022-05-31 PROBLEM — D69.2 SENILE PURPURA (HCC): Status: ACTIVE | Noted: 2022-05-31

## 2022-05-31 PROBLEM — I70.0 ATHEROSCLEROSIS OF AORTA (HCC): Status: ACTIVE | Noted: 2022-05-31

## 2022-07-13 ENCOUNTER — APPOINTMENT (OUTPATIENT)
Dept: GENERAL RADIOLOGY | Age: 72
End: 2022-07-13
Attending: EMERGENCY MEDICINE
Payer: MEDICARE

## 2022-07-13 ENCOUNTER — HOSPITAL ENCOUNTER (OUTPATIENT)
Age: 72
Setting detail: OBSERVATION
Discharge: HOME | End: 2022-07-15
Attending: EMERGENCY MEDICINE | Admitting: INTERNAL MEDICINE
Payer: MEDICARE

## 2022-07-13 DIAGNOSIS — R07.9 CHEST PAIN, UNSPECIFIED TYPE: Primary | ICD-10-CM

## 2022-07-13 LAB
ALBUMIN SERPL-MCNC: 3.9 G/DL (ref 3.5–5)
ALBUMIN/GLOB SERPL: 1 {RATIO} (ref 1.1–2.2)
ALP SERPL-CCNC: 128 U/L (ref 45–117)
ALT SERPL-CCNC: 35 U/L (ref 12–78)
ANION GAP SERPL CALC-SCNC: 9 MMOL/L (ref 5–15)
AST SERPL W P-5'-P-CCNC: 27 U/L (ref 15–37)
BASOPHILS # BLD: 0.1 K/UL (ref 0–0.1)
BASOPHILS NFR BLD: 1 % (ref 0–1)
BILIRUB SERPL-MCNC: 0.5 MG/DL (ref 0.2–1)
BUN SERPL-MCNC: 18 MG/DL (ref 6–20)
BUN/CREAT SERPL: 22 (ref 12–20)
CA-I BLD-MCNC: 10.1 MG/DL (ref 8.5–10.1)
CHLORIDE SERPL-SCNC: 99 MMOL/L (ref 97–108)
CO2 SERPL-SCNC: 31 MMOL/L (ref 21–32)
CREAT SERPL-MCNC: 0.83 MG/DL (ref 0.55–1.02)
DIFFERENTIAL METHOD BLD: NORMAL
EOSINOPHIL # BLD: 0.3 K/UL (ref 0–0.4)
EOSINOPHIL NFR BLD: 3 % (ref 0–7)
ERYTHROCYTE [DISTWIDTH] IN BLOOD BY AUTOMATED COUNT: 13.1 % (ref 11.5–14.5)
GLOBULIN SER CALC-MCNC: 4.1 G/DL (ref 2–4)
GLUCOSE SERPL-MCNC: 142 MG/DL (ref 65–100)
HCT VFR BLD AUTO: 35.9 % (ref 35–47)
HGB BLD-MCNC: 11.6 G/DL (ref 11.5–16)
IMM GRANULOCYTES # BLD AUTO: 0 K/UL (ref 0–0.04)
IMM GRANULOCYTES NFR BLD AUTO: 0 % (ref 0–0.5)
LYMPHOCYTES # BLD: 2.9 K/UL (ref 0.8–3.5)
LYMPHOCYTES NFR BLD: 32 % (ref 12–49)
MCH RBC QN AUTO: 27.1 PG (ref 26–34)
MCHC RBC AUTO-ENTMCNC: 32.3 G/DL (ref 30–36.5)
MCV RBC AUTO: 83.9 FL (ref 80–99)
MONOCYTES # BLD: 0.8 K/UL (ref 0–1)
MONOCYTES NFR BLD: 9 % (ref 5–13)
NEUTS SEG # BLD: 5 K/UL (ref 1.8–8)
NEUTS SEG NFR BLD: 55 % (ref 32–75)
PLATELET # BLD AUTO: 312 K/UL (ref 150–400)
PMV BLD AUTO: 10.7 FL (ref 8.9–12.9)
POTASSIUM SERPL-SCNC: 3.6 MMOL/L (ref 3.5–5.1)
PROT SERPL-MCNC: 8 G/DL (ref 6.4–8.2)
RBC # BLD AUTO: 4.28 M/UL (ref 3.8–5.2)
SODIUM SERPL-SCNC: 139 MMOL/L (ref 136–145)
TROPONIN-HIGH SENSITIVITY: 7 NG/L (ref 0–51)
TROPONIN-HIGH SENSITIVITY: 8 NG/L (ref 0–51)
WBC # BLD AUTO: 9.1 K/UL (ref 3.6–11)

## 2022-07-13 PROCEDURE — G0378 HOSPITAL OBSERVATION PER HR: HCPCS

## 2022-07-13 PROCEDURE — 36415 COLL VENOUS BLD VENIPUNCTURE: CPT

## 2022-07-13 PROCEDURE — 74011000250 HC RX REV CODE- 250: Performed by: INTERNAL MEDICINE

## 2022-07-13 PROCEDURE — 74011250637 HC RX REV CODE- 250/637: Performed by: INTERNAL MEDICINE

## 2022-07-13 PROCEDURE — 71045 X-RAY EXAM CHEST 1 VIEW: CPT

## 2022-07-13 PROCEDURE — 93005 ELECTROCARDIOGRAM TRACING: CPT

## 2022-07-13 PROCEDURE — 99285 EMERGENCY DEPT VISIT HI MDM: CPT

## 2022-07-13 PROCEDURE — 80053 COMPREHEN METABOLIC PANEL: CPT

## 2022-07-13 PROCEDURE — 84484 ASSAY OF TROPONIN QUANT: CPT

## 2022-07-13 PROCEDURE — 85025 COMPLETE CBC W/AUTO DIFF WBC: CPT

## 2022-07-13 PROCEDURE — 74011250637 HC RX REV CODE- 250/637: Performed by: EMERGENCY MEDICINE

## 2022-07-13 RX ORDER — LOSARTAN POTASSIUM 50 MG/1
100 TABLET ORAL DAILY
Status: DISCONTINUED | OUTPATIENT
Start: 2022-07-14 | End: 2022-07-15 | Stop reason: HOSPADM

## 2022-07-13 RX ORDER — ROPINIROLE 1 MG/1
4 TABLET, FILM COATED ORAL 2 TIMES DAILY
Status: DISCONTINUED | OUTPATIENT
Start: 2022-07-13 | End: 2022-07-15 | Stop reason: HOSPADM

## 2022-07-13 RX ORDER — ALBUTEROL SULFATE 90 UG/1
1 AEROSOL, METERED RESPIRATORY (INHALATION)
Status: DISCONTINUED | OUTPATIENT
Start: 2022-07-13 | End: 2022-07-15 | Stop reason: HOSPADM

## 2022-07-13 RX ORDER — ATORVASTATIN CALCIUM 40 MG/1
40 TABLET, FILM COATED ORAL DAILY
Status: DISCONTINUED | OUTPATIENT
Start: 2022-07-14 | End: 2022-07-15 | Stop reason: HOSPADM

## 2022-07-13 RX ORDER — ACETAMINOPHEN 650 MG/1
650 SUPPOSITORY RECTAL
Status: DISCONTINUED | OUTPATIENT
Start: 2022-07-13 | End: 2022-07-15 | Stop reason: HOSPADM

## 2022-07-13 RX ORDER — LABETALOL HCL 20 MG/4 ML
20 SYRINGE (ML) INTRAVENOUS
Status: DISCONTINUED | OUTPATIENT
Start: 2022-07-13 | End: 2022-07-15 | Stop reason: HOSPADM

## 2022-07-13 RX ORDER — LORAZEPAM 0.5 MG/1
0.5 TABLET ORAL
Status: DISCONTINUED | OUTPATIENT
Start: 2022-07-13 | End: 2022-07-15 | Stop reason: HOSPADM

## 2022-07-13 RX ORDER — SODIUM CHLORIDE 0.9 % (FLUSH) 0.9 %
5-40 SYRINGE (ML) INJECTION EVERY 8 HOURS
Status: DISCONTINUED | OUTPATIENT
Start: 2022-07-13 | End: 2022-07-15 | Stop reason: HOSPADM

## 2022-07-13 RX ORDER — NITROGLYCERIN 0.4 MG/1
0.4 TABLET SUBLINGUAL
Status: COMPLETED | OUTPATIENT
Start: 2022-07-13 | End: 2022-07-13

## 2022-07-13 RX ORDER — ONDANSETRON 4 MG/1
4 TABLET, ORALLY DISINTEGRATING ORAL
Status: DISCONTINUED | OUTPATIENT
Start: 2022-07-13 | End: 2022-07-15 | Stop reason: HOSPADM

## 2022-07-13 RX ORDER — ACETAMINOPHEN 325 MG/1
650 TABLET ORAL
Status: DISCONTINUED | OUTPATIENT
Start: 2022-07-13 | End: 2022-07-15 | Stop reason: HOSPADM

## 2022-07-13 RX ORDER — HYDROCHLOROTHIAZIDE 25 MG/1
25 TABLET ORAL DAILY
Status: DISCONTINUED | OUTPATIENT
Start: 2022-07-14 | End: 2022-07-15 | Stop reason: HOSPADM

## 2022-07-13 RX ORDER — SODIUM CHLORIDE 0.9 % (FLUSH) 0.9 %
5-40 SYRINGE (ML) INJECTION AS NEEDED
Status: DISCONTINUED | OUTPATIENT
Start: 2022-07-13 | End: 2022-07-15 | Stop reason: HOSPADM

## 2022-07-13 RX ORDER — AMLODIPINE BESYLATE 5 MG/1
10 TABLET ORAL DAILY
Status: DISCONTINUED | OUTPATIENT
Start: 2022-07-14 | End: 2022-07-15 | Stop reason: HOSPADM

## 2022-07-13 RX ORDER — BUDESONIDE AND FORMOTEROL FUMARATE DIHYDRATE 160; 4.5 UG/1; UG/1
2 AEROSOL RESPIRATORY (INHALATION)
Status: DISCONTINUED | OUTPATIENT
Start: 2022-07-13 | End: 2022-07-13

## 2022-07-13 RX ORDER — LEVOTHYROXINE SODIUM 25 UG/1
50 TABLET ORAL
Status: DISCONTINUED | OUTPATIENT
Start: 2022-07-14 | End: 2022-07-15 | Stop reason: HOSPADM

## 2022-07-13 RX ORDER — ASPIRIN 81 MG/1
81 TABLET ORAL DAILY
Status: DISCONTINUED | OUTPATIENT
Start: 2022-07-14 | End: 2022-07-15 | Stop reason: HOSPADM

## 2022-07-13 RX ORDER — ENOXAPARIN SODIUM 100 MG/ML
40 INJECTION SUBCUTANEOUS DAILY
Status: DISCONTINUED | OUTPATIENT
Start: 2022-07-14 | End: 2022-07-15 | Stop reason: HOSPADM

## 2022-07-13 RX ORDER — GABAPENTIN 300 MG/1
300 CAPSULE ORAL 3 TIMES DAILY
Status: DISCONTINUED | OUTPATIENT
Start: 2022-07-13 | End: 2022-07-15 | Stop reason: HOSPADM

## 2022-07-13 RX ORDER — NITROGLYCERIN 0.4 MG/1
0.4 TABLET SUBLINGUAL AS NEEDED
Status: DISCONTINUED | OUTPATIENT
Start: 2022-07-13 | End: 2022-07-15 | Stop reason: HOSPADM

## 2022-07-13 RX ORDER — BUDESONIDE AND FORMOTEROL FUMARATE DIHYDRATE 160; 4.5 UG/1; UG/1
2 AEROSOL RESPIRATORY (INHALATION)
Status: DISCONTINUED | OUTPATIENT
Start: 2022-07-14 | End: 2022-07-15 | Stop reason: HOSPADM

## 2022-07-13 RX ORDER — ASPIRIN 325 MG
325 TABLET ORAL
Status: COMPLETED | OUTPATIENT
Start: 2022-07-13 | End: 2022-07-13

## 2022-07-13 RX ORDER — POLYETHYLENE GLYCOL 3350 17 G/17G
17 POWDER, FOR SOLUTION ORAL DAILY PRN
Status: DISCONTINUED | OUTPATIENT
Start: 2022-07-13 | End: 2022-07-15 | Stop reason: HOSPADM

## 2022-07-13 RX ORDER — ONDANSETRON 2 MG/ML
4 INJECTION INTRAMUSCULAR; INTRAVENOUS
Status: DISCONTINUED | OUTPATIENT
Start: 2022-07-13 | End: 2022-07-15 | Stop reason: HOSPADM

## 2022-07-13 RX ADMIN — GABAPENTIN 300 MG: 300 CAPSULE ORAL at 23:29

## 2022-07-13 RX ADMIN — ROPINIROLE HYDROCHLORIDE 4 MG: 1 TABLET, FILM COATED ORAL at 23:29

## 2022-07-13 RX ADMIN — NITROGLYCERIN 0.4 MG: 0.4 TABLET, ORALLY DISINTEGRATING SUBLINGUAL at 16:43

## 2022-07-13 RX ADMIN — ASPIRIN 325 MG ORAL TABLET 325 MG: 325 PILL ORAL at 16:43

## 2022-07-13 RX ADMIN — SODIUM CHLORIDE, PRESERVATIVE FREE 10 ML: 5 INJECTION INTRAVENOUS at 23:30

## 2022-07-13 NOTE — Clinical Note
Patient Class[de-identified] OBSERVATION [104]   Type of Bed: Telemetry [19]   Cardiac Monitoring Required?: Yes   Reason for Observation: chest pain   Admitting Diagnosis: Chest pain [393888]   Admitting Physician: Oleksandr Roque [[id number]]   Attending Physician: Lane Bradford

## 2022-07-13 NOTE — ED NOTES
Has ambulated in room, currently lying in bed, talking on phone, smiling, no distress noted. V/s charted.

## 2022-07-13 NOTE — ED PROVIDER NOTES
EMERGENCY DEPARTMENT HISTORY AND PHYSICAL EXAM      Date: 7/13/2022  Patient Name: Boris Decker    History of Presenting Illness     Chief Complaint   Patient presents with    Chest Pain       History Provided By: Patient    HPI: Boris Decker, 73 y.o. female with history of COPD, diabetes, hypertension, and hyperlipidemia who presents with chest discomfort. States that symptoms started last night. She was at rest.  Pain is in the center of her chest and radiates to her back. Pain is moderate, achy, and without exacerbating symptoms. Denies any leg swelling. No history of DVT or PE. She has had some cough, sneezing, and difficulty breathing however this is chronic for her due to her COPD. There are no other complaints, changes, or physical findings at this time. PCP: Lyn Phelps MD    Current Outpatient Medications   Medication Sig Dispense Refill    LORazepam (ATIVAN) 0.5 mg tablet TAKE 1-2 TABS BY MOUTH TWICE A DAY AS NEEDED 30 Tablet 0    cyclobenzaprine (FLEXERIL) 5 mg tablet Take 1-2 Tablets by mouth three (3) times daily as needed for Muscle Spasm(s). 30 Tablet 3    gabapentin (NEURONTIN) 300 mg capsule Take 1 Capsule by mouth three (3) times daily. Max Daily Amount: 900 mg. 270 Capsule 3    rOPINIRole (REQUIP) 4 mg tab TAB TAKE 1 TABLET BY MOUTH TWO TIMES A  Tablet 3    atorvastatin (LIPITOR) 10 mg tablet TAKE 1 TABLET BY MOUTH DAILY AT BEDTIME 90 Tablet 3    amLODIPine (NORVASC) 10 mg tablet TAKE 1 TABLET BY MOUTH DAILY 90 Tablet 3    losartan-hydroCHLOROthiazide (HYZAAR) 100-25 mg per tablet TAKE 1 TABLET BY MOUTH DAILY 90 Tablet 3    levothyroxine (SYNTHROID) 50 mcg tablet Take 1 Tablet by mouth every morning. 90 Tablet 3    metFORMIN (GLUCOPHAGE) 500 mg tablet Take 2 Tablets by mouth two (2) times a day. 360 Tablet 3    sertraline (ZOLOFT) 50 mg tablet Take 1 Tablet by mouth daily.  90 Tablet 3    fluticasone-umeclidinium-vilanterol (Trelegy Ellipta) 100-62.5-25 mcg inhaler Take 1 Puff by inhalation daily. 60 Each 5    albuterol (ProAir HFA) 90 mcg/actuation inhaler Take 1 Puff by inhalation every four (4) hours as needed for Wheezing. 18 g 0    methocarbamoL (Robaxin) 500 mg tablet Take 1 Tablet by mouth two (2) times daily as needed for Muscle Spasm(s). 30 Tablet 2    cyanocobalamin 1,000 mcg tablet Take 1,000 mcg by mouth daily. Patient states she takes 2,000 mg tablet a day      meloxicam (MOBIC) 7.5 mg tablet TAKE 1 TABLET BY MOUTH DAILY 90 Tab 3    aspirin delayed-release 81 mg tablet Take  by mouth daily. Past History       Past Medical History:  Past Medical History:   Diagnosis Date    Bacterial skin infection of trunk 9/2/2020    Chronic obstructive pulmonary disease ([facility] [address] 3/31/2022    Colon polyp     DM (diabetes mellitus) ([facility] [address] 5/24/2011    Environmental allergies     HTN (hypertension) 5/24/2011    Hyperlipidemia 5/24/2011    Panic attacks     RLS (restless legs syndrome) 5/24/2011        Past Surgical History:  Past Surgical History:   Procedure Laterality Date    ENDOSCOPY, COLON, DIAGNOSTIC  3/20/12    OK. Rep 10 yrs. Annmarie Thao)    HX APPENDECTOMY      HX CHOLECYSTECTOMY         Family History:  Family History   Problem Relation Age of Onset    Thyroid Disease Mother        Social History:  Social History     Tobacco Use    Smoking status: Never Smoker    Smokeless tobacco: Never Used    Tobacco comment: has second hand smoking exposure for long time over the years   Substance Use Topics    Alcohol use: Yes     Comment: occ    Drug use: Never       Allergies:  No Known Allergies     Review of Systems     Review of Systems   Constitutional: Negative for fever. HENT: Negative for congestion. Eyes: Negative for visual disturbance. Respiratory: Positive for shortness of breath. Cardiovascular: Positive for chest pain. Gastrointestinal: Negative for abdominal pain.    Genitourinary: Negative for dysuria. Musculoskeletal: Negative for arthralgias. Skin: Negative for rash. Neurological: Negative for headaches. Physical Exam     Constitutional: No acute distress. Well-nourished. Skin: No rash. ENT: No rhinorrhea. No cough. Head is normocephalic and atraumatic. Eye: No proptosis or conjunctival injections. Respiratory: No apparent respiratory distress. Lung sounds are clear bilaterally. No wheezing. Gastrointestinal: Nondistended. Musculoskeletal: No obvious bony deformities. Cardio: Regular rate and rhythm. No murmurs. 2+ radial pulses. No leg edema. Lab and Diagnostic Study Results     Labs -     Recent Results (from the past 12 hour(s))   CBC WITH AUTOMATED DIFF    Collection Time: 07/13/22  1:00 PM   Result Value Ref Range    WBC 9.1 3.6 - 11.0 K/uL    RBC 4.28 3.80 - 5.20 M/uL    HGB 11.6 11.5 - 16.0 g/dL    HCT 35.9 35.0 - 47.0 %    MCV 83.9 80.0 - 99.0 FL    MCH 27.1 26.0 - 34.0 PG    MCHC 32.3 30.0 - 36.5 g/dL    RDW 13.1 11.5 - 14.5 %    PLATELET 741 453 - 779 K/uL    MPV 10.7 8.9 - 12.9 FL    NEUTROPHILS 55 32 - 75 %    LYMPHOCYTES 32 12 - 49 %    MONOCYTES 9 5 - 13 %    EOSINOPHILS 3 0 - 7 %    BASOPHILS 1 0 - 1 %    IMMATURE GRANULOCYTES 0 0.0 - 0.5 %    ABS. NEUTROPHILS 5.0 1.8 - 8.0 K/UL    ABS. LYMPHOCYTES 2.9 0.8 - 3.5 K/UL    ABS. MONOCYTES 0.8 0.0 - 1.0 K/UL    ABS. EOSINOPHILS 0.3 0.0 - 0.4 K/UL    ABS. BASOPHILS 0.1 0.0 - 0.1 K/UL    ABS. IMM.  GRANS. 0.0 0.00 - 0.04 K/UL    DF AUTOMATED     METABOLIC PANEL, COMPREHENSIVE    Collection Time: 07/13/22  1:00 PM   Result Value Ref Range    Sodium 139 136 - 145 mmol/L    Potassium 3.6 3.5 - 5.1 mmol/L    Chloride 99 97 - 108 mmol/L    CO2 31 21 - 32 mmol/L    Anion gap 9 5 - 15 mmol/L    Glucose 142 (H) 65 - 100 mg/dL    BUN 18 6 - 20 mg/dL    Creatinine 0.83 0.55 - 1.02 mg/dL    BUN/Creatinine ratio 22 (H) 12 - 20      GFR est AA >60 >60 ml/min/1.73m2    GFR est non-AA >60 >60 ml/min/1.73m2    Calcium 10.1 8.5 - 10.1 mg/dL    Bilirubin, total 0.5 0.2 - 1.0 mg/dL    AST (SGOT) 27 15 - 37 U/L    ALT (SGPT) 35 12 - 78 U/L    Alk. phosphatase 128 (H) 45 - 117 U/L    Protein, total 8.0 6.4 - 8.2 g/dL    Albumin 3.9 3.5 - 5.0 g/dL    Globulin 4.1 (H) 2.0 - 4.0 g/dL    A-G Ratio 1.0 (L) 1.1 - 2.2     TROPONIN-HIGH SENSITIVITY    Collection Time: 07/13/22  1:00 PM   Result Value Ref Range    Troponin-High Sensitivity 7 0 - 51 ng/L   TROPONIN-HIGH SENSITIVITY    Collection Time: 07/13/22  2:40 PM   Result Value Ref Range    Troponin-High Sensitivity 8 0 - 51 ng/L       Radiologic Studies -   [unfilled]  CT Results  (Last 48 hours)    None        CXR Results  (Last 48 hours)               07/13/22 1306  XR CHEST SNGL V Final result    Impression:  No Acute Disease. Narrative:  EXAM: Portable CXR. 1305 hours. INDICATION: chest pain       FINDINGS:   The lungs appear clear. Heart is normal in size. There is no pulmonary edema. There is no evident pneumothorax or pleural effusion. Medical Decision Making and ED Course     - I am the first and primary provider for this patient AND AM THE PRIMARY PROVIDER OF RECORD. I reviewed the vital signs, available nursing notes, past medical history, past surgical history, family history and social history. - Initial assessment performed. The patients presenting problems have been discussed, and the staff are in agreement with the care plan formulated and outlined with them. I have encouraged them to ask questions as they arise throughout their visit. Vital Signs-Reviewed the patient's vital signs. Patient Vitals for the past 12 hrs:   Temp Pulse Resp BP SpO2   07/13/22 1658 -- 78 18 133/84 --   07/13/22 1252 98.7 °F (37.1 °C) 83 18 (!) 166/93 95 %       EKG interpretation: (Preliminary): EKG Interpreted by ED physician. Obtained on 7/13/2022 at 13 00.   Sinus rhythm at rate of 82 bpm.  Normal NM interval, QRS duration, QTc interval.  No ST segment abnormalities. Normal axis. MDM  The differential diagnosis is ACS, anxiety, atypical chest pain, pleurisy, costochondritis, pneumonia. Work-up is unremarkable including troponin x2 and EKG however patient does have elevated heart score. She does have significant risk factors for ACS. Pain is also relieved with nitroglycerin sublingual.  Due to this I feel patient should stay for cardiology consultation. Discussed with hospitalist and will admit. She did get 325 mg aspirin. Procedures and Critical Care     Table 1  Composition of the HEART score for chest pain patients in the emergency room. HEART score for chest pain patients     Score  History   Highly suspicious    2     Moderately suspicious   1     Slightly suspicious    0  ECG   Significant ST depression   2     Nonspecific repolarisation disturbance 1     Normal      0  Age   =65 year     2     45-65 year     1     <45 year     0  Risk factors  =3 risk factors or history of cad  2     1 or 2 risk factors    1     No risk factors known    0  Troponin  >2x normal limit    2     1-2x normal limit    1     =normal limit     0  Total      CRITICAL ACTIONS    0-3: 0.9-1.7% risk of adverse cardiac event. In the HEART Score, these patients were  discharged. (0.99% retrospective) (1.7% prospective)  4-6: 12-16.6% risk of adverse cardiac event. In the HEART Score, these patients were  admitted to the hospital. (11.6% retrospective) (16.6% prospective)  =7: 50-65% risk of adverse cardiac event. In the HEART Score, these patients were  candidates for early invasive measures. (65.2% retrospective) (50.1%  prospective)  MACE (Major Adverse Cardiac Events) is defined as: all-cause mortality, myocardial infarction, or coronary revascularization    ED Heart Score  History:  Moderately Suspicious  ECG: Nonspecific repolarization disturbance  Age: Greater than or equal to 65 years  Risk Factors: Greater than or equal to 3 risk factors, or history of atherosclerotic disease  Troponin: Less than or equal to normal limit  HEART Score Total : 6    Disposition     Disposition:     Admitted to Dr. Anabel Gann    Diagnosis/Clinical Impression     Clinical Impression:   1. Chest pain, unspecified type       Attestations:  Ifeoma Shaw, DO    Please note that this dictation was completed with [employer], the computer voice recognition software. Quite often unanticipated grammatical, syntax, homophones, and other interpretive errors are inadvertently transcribed by the computer software. Please disregard these errors. Please excuse any errors that have escaped final proofreading. Thank you.

## 2022-07-14 ENCOUNTER — APPOINTMENT (OUTPATIENT)
Dept: NON INVASIVE DIAGNOSTICS | Age: 72
End: 2022-07-14
Attending: INTERNAL MEDICINE
Payer: MEDICARE

## 2022-07-14 ENCOUNTER — APPOINTMENT (OUTPATIENT)
Dept: ENDOSCOPY | Age: 72
End: 2022-07-14
Attending: INTERNAL MEDICINE
Payer: MEDICARE

## 2022-07-14 LAB
ANION GAP SERPL CALC-SCNC: 6 MMOL/L (ref 5–15)
ATRIAL RATE: 82 BPM
BUN SERPL-MCNC: 17 MG/DL (ref 6–20)
BUN/CREAT SERPL: 24 (ref 12–20)
CA-I BLD-MCNC: 9.8 MG/DL (ref 8.5–10.1)
CALCULATED R AXIS, ECG10: -3 DEGREES
CALCULATED T AXIS, ECG11: 49 DEGREES
CHLORIDE SERPL-SCNC: 99 MMOL/L (ref 97–108)
CO2 SERPL-SCNC: 32 MMOL/L (ref 21–32)
CREAT SERPL-MCNC: 0.7 MG/DL (ref 0.55–1.02)
D DIMER PPP FEU-MCNC: 0.56 UG/ML(FEU)
DIAGNOSIS, 93000: NORMAL
ECHO AO ASC DIAM: 2.4 CM
ECHO AO ASCENDING AORTA INDEX: 1.3 CM/M2
ECHO AO ROOT DIAM: 2.9 CM
ECHO AO ROOT INDEX: 1.57 CM/M2
ECHO AV AREA PEAK VELOCITY: 2.3 CM2
ECHO AV AREA VTI: 2.6 CM2
ECHO AV AREA/BSA PEAK VELOCITY: 1.2 CM2/M2
ECHO AV AREA/BSA VTI: 1.4 CM2/M2
ECHO AV MEAN GRADIENT: 5 MMHG
ECHO AV MEAN VELOCITY: 1 M/S
ECHO AV PEAK GRADIENT: 9 MMHG
ECHO AV PEAK VELOCITY: 1.5 M/S
ECHO AV VELOCITY RATIO: 0.73
ECHO AV VTI: 23.9 CM
ECHO EST RA PRESSURE: 3 MMHG
ECHO LA AREA 2C: 14.1 CM2
ECHO LA AREA 4C: 13.9 CM2
ECHO LA DIAMETER INDEX: 1.46 CM/M2
ECHO LA DIAMETER: 2.7 CM
ECHO LA MAJOR AXIS: 5.4 CM
ECHO LA MINOR AXIS: 4.6 CM
ECHO LA TO AORTIC ROOT RATIO: 0.93
ECHO LA VOL BP: 34 ML (ref 22–52)
ECHO LA VOL/BSA BIPLANE: 18 ML/M2 (ref 16–34)
ECHO LV E' LATERAL VELOCITY: 8 CM/S
ECHO LV E' SEPTAL VELOCITY: 5 CM/S
ECHO LV EDV A2C: 11 ML
ECHO LV EDV A4C: 31 ML
ECHO LV EDV INDEX A4C: 17 ML/M2
ECHO LV EDV NDEX A2C: 6 ML/M2
ECHO LV EJECTION FRACTION A2C: 64 %
ECHO LV EJECTION FRACTION A4C: 84 %
ECHO LV ESV A2C: 4 ML
ECHO LV ESV A4C: 5 ML
ECHO LV ESV INDEX A2C: 2 ML/M2
ECHO LV ESV INDEX A4C: 3 ML/M2
ECHO LV FRACTIONAL SHORTENING: 28 % (ref 28–44)
ECHO LV INTERNAL DIMENSION DIASTOLE INDEX: 2.11 CM/M2
ECHO LV INTERNAL DIMENSION DIASTOLIC: 3.9 CM (ref 3.9–5.3)
ECHO LV INTERNAL DIMENSION SYSTOLIC INDEX: 1.51 CM/M2
ECHO LV INTERNAL DIMENSION SYSTOLIC: 2.8 CM
ECHO LV IVSD: 0.9 CM (ref 0.6–0.9)
ECHO LV MASS 2D: 97.4 G (ref 67–162)
ECHO LV MASS INDEX 2D: 52.6 G/M2 (ref 43–95)
ECHO LV POSTERIOR WALL DIASTOLIC: 0.8 CM (ref 0.6–0.9)
ECHO LV RELATIVE WALL THICKNESS RATIO: 0.41
ECHO LVOT AREA: 3.1 CM2
ECHO LVOT AV VTI INDEX: 0.83
ECHO LVOT DIAM: 2 CM
ECHO LVOT MEAN GRADIENT: 3 MMHG
ECHO LVOT PEAK GRADIENT: 5 MMHG
ECHO LVOT PEAK VELOCITY: 1.1 M/S
ECHO LVOT STROKE VOLUME INDEX: 33.6 ML/M2
ECHO LVOT SV: 62.2 ML
ECHO LVOT VTI: 19.8 CM
ECHO MV A VELOCITY: 0.95 M/S
ECHO MV AREA VTI: 3.1 CM2
ECHO MV E VELOCITY: 0.63 M/S
ECHO MV E/A RATIO: 0.66
ECHO MV E/E' LATERAL: 7.88
ECHO MV E/E' RATIO (AVERAGED): 10.24
ECHO MV E/E' SEPTAL: 12.6
ECHO MV LVOT VTI INDEX: 1.01
ECHO MV MAX VELOCITY: 1 M/S
ECHO MV MEAN GRADIENT: 1 MMHG
ECHO MV MEAN VELOCITY: 0.5 M/S
ECHO MV PEAK GRADIENT: 4 MMHG
ECHO MV REGURGITANT PEAK GRADIENT: 3 MMHG
ECHO MV REGURGITANT PEAK VELOCITY: 0.9 M/S
ECHO MV REGURGITANT VTIA: 19.6 CM
ECHO MV VTI: 20 CM
ECHO PV MAX VELOCITY: 1 M/S
ECHO PV MEAN GRADIENT: 2 MMHG
ECHO PV MEAN VELOCITY: 0.7 M/S
ECHO PV PEAK GRADIENT: 4 MMHG
ECHO PV VTI: 15.7 CM
ECHO RA AREA 4C: 13 CM2
ECHO RIGHT VENTRICULAR SYSTOLIC PRESSURE (RVSP): 20 MMHG
ECHO RV BASAL DIMENSION: 3.3 CM
ECHO RV MID DIMENSION: 2.5 CM
ECHO RV TAPSE: 1.8 CM (ref 1.7–?)
ECHO TV REGURGITANT MAX VELOCITY: 2.09 M/S
ECHO TV REGURGITANT PEAK GRADIENT: 17 MMHG
ERYTHROCYTE [DISTWIDTH] IN BLOOD BY AUTOMATED COUNT: 13.2 % (ref 11.5–14.5)
GLUCOSE SERPL-MCNC: 135 MG/DL (ref 65–100)
HCT VFR BLD AUTO: 36.8 % (ref 35–47)
HGB BLD-MCNC: 11.5 G/DL (ref 11.5–16)
MCH RBC QN AUTO: 26.4 PG (ref 26–34)
MCHC RBC AUTO-ENTMCNC: 31.3 G/DL (ref 30–36.5)
MCV RBC AUTO: 84.4 FL (ref 80–99)
NRBC # BLD: 0 K/UL (ref 0–0.01)
NRBC BLD-RTO: 0 PER 100 WBC
P-R INTERVAL, ECG05: 136 MS
PLATELET # BLD AUTO: 303 K/UL (ref 150–400)
PMV BLD AUTO: 11.4 FL (ref 8.9–12.9)
POTASSIUM SERPL-SCNC: 3.8 MMOL/L (ref 3.5–5.1)
Q-T INTERVAL, ECG07: 382 MS
QRS DURATION, ECG06: 82 MS
QTC CALCULATION (BEZET), ECG08: 446 MS
RBC # BLD AUTO: 4.36 M/UL (ref 3.8–5.2)
SODIUM SERPL-SCNC: 137 MMOL/L (ref 136–145)
VENTRICULAR RATE, ECG03: 82 BPM
WBC # BLD AUTO: 7.5 K/UL (ref 3.6–11)

## 2022-07-14 PROCEDURE — 93306 TTE W/DOPPLER COMPLETE: CPT

## 2022-07-14 PROCEDURE — 94640 AIRWAY INHALATION TREATMENT: CPT

## 2022-07-14 PROCEDURE — 74011250636 HC RX REV CODE- 250/636: Performed by: INTERNAL MEDICINE

## 2022-07-14 PROCEDURE — 74011000250 HC RX REV CODE- 250: Performed by: INTERNAL MEDICINE

## 2022-07-14 PROCEDURE — 74011250637 HC RX REV CODE- 250/637: Performed by: INTERNAL MEDICINE

## 2022-07-14 PROCEDURE — 85027 COMPLETE CBC AUTOMATED: CPT

## 2022-07-14 PROCEDURE — 93005 ELECTROCARDIOGRAM TRACING: CPT

## 2022-07-14 PROCEDURE — 85379 FIBRIN DEGRADATION QUANT: CPT

## 2022-07-14 PROCEDURE — G0378 HOSPITAL OBSERVATION PER HR: HCPCS

## 2022-07-14 PROCEDURE — 80048 BASIC METABOLIC PNL TOTAL CA: CPT

## 2022-07-14 PROCEDURE — 36415 COLL VENOUS BLD VENIPUNCTURE: CPT

## 2022-07-14 PROCEDURE — 96372 THER/PROPH/DIAG INJ SC/IM: CPT

## 2022-07-14 RX ORDER — LOSARTAN POTASSIUM AND HYDROCHLOROTHIAZIDE 25; 100 MG/1; MG/1
1 TABLET ORAL DAILY
COMMUNITY

## 2022-07-14 RX ORDER — ALBUTEROL SULFATE 90 UG/1
2 AEROSOL, METERED RESPIRATORY (INHALATION)
COMMUNITY

## 2022-07-14 RX ADMIN — BUDESONIDE AND FORMOTEROL FUMARATE DIHYDRATE 2 PUFF: 160; 4.5 AEROSOL RESPIRATORY (INHALATION) at 07:21

## 2022-07-14 RX ADMIN — ROPINIROLE HYDROCHLORIDE 4 MG: 1 TABLET, FILM COATED ORAL at 17:10

## 2022-07-14 RX ADMIN — SODIUM CHLORIDE, PRESERVATIVE FREE 10 ML: 5 INJECTION INTRAVENOUS at 15:42

## 2022-07-14 RX ADMIN — ROPINIROLE HYDROCHLORIDE 4 MG: 1 TABLET, FILM COATED ORAL at 21:32

## 2022-07-14 RX ADMIN — LEVOTHYROXINE SODIUM 50 MCG: 0.03 TABLET ORAL at 08:07

## 2022-07-14 RX ADMIN — GABAPENTIN 300 MG: 300 CAPSULE ORAL at 15:42

## 2022-07-14 RX ADMIN — GABAPENTIN 300 MG: 300 CAPSULE ORAL at 21:32

## 2022-07-14 RX ADMIN — ENOXAPARIN SODIUM 40 MG: 100 INJECTION SUBCUTANEOUS at 09:58

## 2022-07-14 RX ADMIN — SODIUM CHLORIDE, PRESERVATIVE FREE 10 ML: 5 INJECTION INTRAVENOUS at 21:33

## 2022-07-14 RX ADMIN — BUDESONIDE AND FORMOTEROL FUMARATE DIHYDRATE 2 PUFF: 160; 4.5 AEROSOL RESPIRATORY (INHALATION) at 20:48

## 2022-07-14 RX ADMIN — TIOTROPIUM BROMIDE INHALATION SPRAY 2 PUFF: 3.12 SPRAY, METERED RESPIRATORY (INHALATION) at 07:22

## 2022-07-14 NOTE — PROGRESS NOTES
Medicare Outpatient Observation Notice (MOON)/ [place] Outpatient Observation Notice (Veronica Brown) provided to patient/representative with verbal explanation of the notice. Time allotted for questions regarding the notice. Patient /representative provided a completed copy of the MOON/VOON notice. Copy placed on bedside chart.

## 2022-07-14 NOTE — PROGRESS NOTES
Hospitalist Progress Note         KASIE Joseph, FNP-C    Daily Progress Note: 7/14/2022      Subjective:   Subjective   Patient examined alert and oriented lying in bed. No acute distress noted on examination. Continues to report acute chest pain. No overnight events reported. Review of Systems:   Review of Systems   Constitutional: Negative for fever. HENT: Negative for hearing loss and tinnitus. Respiratory: Negative for cough and shortness of breath. Cardiovascular: Positive for chest pain. Gastrointestinal: Negative for abdominal pain, heartburn, nausea and vomiting. Genitourinary: Negative for dysuria and frequency. Musculoskeletal: Negative for back pain, myalgias and neck pain. Neurological: Negative for dizziness and headaches. Objective:   Objective      Vitals:  Patient Vitals for the past 12 hrs:   Pulse Resp BP SpO2   07/14/22 0722 -- -- -- 90 %   07/14/22 0645 78 -- (!) 142/68 93 %   07/14/22 0000 65 10 126/63 95 %        Physical Exam:  Physical Exam  Vitals and nursing note reviewed. Constitutional:       Appearance: Normal appearance. Eyes:      Extraocular Movements: Extraocular movements intact. Cardiovascular:      Rate and Rhythm: Normal rate. Pulmonary:      Breath sounds: Normal breath sounds. Abdominal:      General: Bowel sounds are normal.   Skin:     General: Skin is warm and dry. Neurological:      Mental Status: She is alert and oriented to person, place, and time.           Lab Results:  Recent Results (from the past 24 hour(s))   CBC WITH AUTOMATED DIFF    Collection Time: 07/13/22  1:00 PM   Result Value Ref Range    WBC 9.1 3.6 - 11.0 K/uL    RBC 4.28 3.80 - 5.20 M/uL    HGB 11.6 11.5 - 16.0 g/dL    HCT 35.9 35.0 - 47.0 %    MCV 83.9 80.0 - 99.0 FL    MCH 27.1 26.0 - 34.0 PG    MCHC 32.3 30.0 - 36.5 g/dL    RDW 13.1 11.5 - 14.5 %    PLATELET 021 005 - 453 K/uL    MPV 10.7 8.9 - 12.9 FL    NEUTROPHILS 55 32 - 75 %    LYMPHOCYTES 32 12 - 49 %    MONOCYTES 9 5 - 13 %    EOSINOPHILS 3 0 - 7 %    BASOPHILS 1 0 - 1 %    IMMATURE GRANULOCYTES 0 0.0 - 0.5 %    ABS. NEUTROPHILS 5.0 1.8 - 8.0 K/UL    ABS. LYMPHOCYTES 2.9 0.8 - 3.5 K/UL    ABS. MONOCYTES 0.8 0.0 - 1.0 K/UL    ABS. EOSINOPHILS 0.3 0.0 - 0.4 K/UL    ABS. BASOPHILS 0.1 0.0 - 0.1 K/UL    ABS. IMM. GRANS. 0.0 0.00 - 0.04 K/UL    DF AUTOMATED     METABOLIC PANEL, COMPREHENSIVE    Collection Time: 07/13/22  1:00 PM   Result Value Ref Range    Sodium 139 136 - 145 mmol/L    Potassium 3.6 3.5 - 5.1 mmol/L    Chloride 99 97 - 108 mmol/L    CO2 31 21 - 32 mmol/L    Anion gap 9 5 - 15 mmol/L    Glucose 142 (H) 65 - 100 mg/dL    BUN 18 6 - 20 mg/dL    Creatinine 0.83 0.55 - 1.02 mg/dL    BUN/Creatinine ratio 22 (H) 12 - 20      GFR est AA >60 >60 ml/min/1.73m2    GFR est non-AA >60 >60 ml/min/1.73m2    Calcium 10.1 8.5 - 10.1 mg/dL    Bilirubin, total 0.5 0.2 - 1.0 mg/dL    AST (SGOT) 27 15 - 37 U/L    ALT (SGPT) 35 12 - 78 U/L    Alk.  phosphatase 128 (H) 45 - 117 U/L    Protein, total 8.0 6.4 - 8.2 g/dL    Albumin 3.9 3.5 - 5.0 g/dL    Globulin 4.1 (H) 2.0 - 4.0 g/dL    A-G Ratio 1.0 (L) 1.1 - 2.2     TROPONIN-HIGH SENSITIVITY    Collection Time: 07/13/22  1:00 PM   Result Value Ref Range    Troponin-High Sensitivity 7 0 - 51 ng/L   EKG, 12 LEAD, INITIAL    Collection Time: 07/13/22  1:00 PM   Result Value Ref Range    Ventricular Rate 82 BPM    Atrial Rate 82 BPM    P-R Interval 136 ms    QRS Duration 82 ms    Q-T Interval 382 ms    QTC Calculation (Bezet) 446 ms    Calculated R Axis -3 degrees    Calculated T Axis 49 degrees    Diagnosis       Normal sinus rhythm  Normal ECG  When compared with ECG of 05-DEC-2021 18:30,  No significant change was found  Confirmed by Callum Souza MD, Mick Moore ([id number]) on 7/14/2022 8:01:48 AM     TROPONIN-HIGH SENSITIVITY    Collection Time: 07/13/22  2:40 PM   Result Value Ref Range    Troponin-High Sensitivity 8 0 - 51 ng/L   METABOLIC PANEL, BASIC    Collection Time: 07/14/22 6:38 AM   Result Value Ref Range    Sodium 137 136 - 145 mmol/L    Potassium 3.8 3.5 - 5.1 mmol/L    Chloride 99 97 - 108 mmol/L    CO2 32 21 - 32 mmol/L    Anion gap 6 5 - 15 mmol/L    Glucose 135 (H) 65 - 100 mg/dL    BUN 17 6 - 20 mg/dL    Creatinine 0.70 0.55 - 1.02 mg/dL    BUN/Creatinine ratio 24 (H) 12 - 20      GFR est AA >60 >60 ml/min/1.73m2    GFR est non-AA >60 >60 ml/min/1.73m2    Calcium 9.8 8.5 - 10.1 mg/dL   CBC W/O DIFF    Collection Time: 07/14/22  6:38 AM   Result Value Ref Range    WBC 7.5 3.6 - 11.0 K/uL    RBC 4.36 3.80 - 5.20 M/uL    HGB 11.5 11.5 - 16.0 g/dL    HCT 36.8 35.0 - 47.0 %    MCV 84.4 80.0 - 99.0 FL    MCH 26.4 26.0 - 34.0 PG    MCHC 31.3 30.0 - 36.5 g/dL    RDW 13.2 11.5 - 14.5 %    PLATELET 574 838 - 467 K/uL    MPV 11.4 8.9 - 12.9 FL    NRBC 0.0 0.0  WBC    ABSOLUTE NRBC 0.00 0.00 - 0.01 K/uL          Diagnostic Images:  CT Results  (Last 48 hours)    None          Current Medications:    Current Facility-Administered Medications:     sodium chloride (NS) flush 5-40 mL, 5-40 mL, IntraVENous, Q8H, Nahun Rivas MD, 10 mL at 07/13/22 2330    sodium chloride (NS) flush 5-40 mL, 5-40 mL, IntraVENous, PRN, Mariam Rivas MD    acetaminophen (TYLENOL) tablet 650 mg, 650 mg, Oral, Q6H PRN **OR** acetaminophen (TYLENOL) suppository 650 mg, 650 mg, Rectal, Q6H PRN, Mariam Rivas MD    polyethylene glycol (MIRALAX) packet 17 g, 17 g, Oral, DAILY PRN, Mariam Rivas MD    ondansetron (ZOFRAN ODT) tablet 4 mg, 4 mg, Oral, Q8H PRN **OR** ondansetron (ZOFRAN) injection 4 mg, 4 mg, IntraVENous, Q6H PRN, Mariam Rivas MD    enoxaparin (LOVENOX) injection 40 mg, 40 mg, SubCUTAneous, DAILY, Mariam Rivas MD    nitroglycerin (NITROSTAT) tablet 0.4 mg, 0.4 mg, SubLINGual, PRN, Mariam Rivas MD    maalox/viscous lidocaine/diphenhydramine (GI COCKTAIL) oral solution 30 mL, 30 mL, Oral, ONCE, Mariam Rivas MD    labetaloL (NORMODYNE;TRANDATE) 20 mg/4 mL (5 mg/mL) injection 20 mg, 20 mg, IntraVENous, Q4H PRN, Sai Rivas MD    albuterol (PROVENTIL HFA, VENTOLIN HFA, PROAIR HFA) inhaler 1 Puff, 1 Puff, Inhalation, Q4H PRN, Sai Rivas MD    losartan (COZAAR) tablet 100 mg, 100 mg, Oral, DAILY, Sai Rivas MD    amLODIPine (NORVASC) tablet 10 mg, 10 mg, Oral, DAILY, Sai Rivas MD    aspirin delayed-release tablet 81 mg, 81 mg, Oral, DAILY, Sai Rivas MD    atorvastatin (LIPITOR) tablet 40 mg, 40 mg, Oral, DAILY, Sai Rivas MD    gabapentin (NEURONTIN) capsule 300 mg, 300 mg, Oral, TID, Sai Rivas MD, 300 mg at 07/13/22 2329    levothyroxine (SYNTHROID) tablet 50 mcg, 50 mcg, Oral, 6am, Evelyn, Nahun Hamilton MD, 50 mcg at 07/14/22 0807    LORazepam (ATIVAN) tablet 0.5 mg, 0.5 mg, Oral, Q12H PRN, Sai Rivas MD    rOPINIRole (REQUIP) tablet 4 mg, 4 mg, Oral, BID, Sai Rivas MD, 4 mg at 07/13/22 2329    tiotropium bromide (SPIRIVA RESPIMAT) 2.5 mcg /actuation, 2 Puff, Inhalation, DAILY, Vickey Goldberg, MD, 2 Puff at 07/14/22 3275    hydroCHLOROthiazide (HYDRODIURIL) tablet 25 mg, 25 mg, Oral, DAILY, Teja Lugo MD    [facility]) 160-4.5 mcg/actuation HFA inhaler 2 Puff, 2 Puff, Inhalation, BID RT, Mona Kasper MD, 2 Puff at 07/14/22 0721    Current Outpatient Medications:     meloxicam (MOBIC) 7.5 mg tablet, TAKE 1 TABLET BY MOUTH DAILY, Disp: 90 Tablet, Rfl: 3    LORazepam (ATIVAN) 0.5 mg tablet, TAKE 1-2 TABS BY MOUTH TWICE A DAY AS NEEDED, Disp: 30 Tablet, Rfl: 0    cyclobenzaprine (FLEXERIL) 5 mg tablet, Take 1-2 Tablets by mouth three (3) times daily as needed for Muscle Spasm(s). , Disp: 30 Tablet, Rfl: 3    gabapentin (NEURONTIN) 300 mg capsule, Take 1 Capsule by mouth three (3) times daily.  Max Daily Amount: 900 mg., Disp: 270 Capsule, Rfl: 3    rOPINIRole (REQUIP) 4 mg tab TAB, TAKE 1 TABLET BY MOUTH TWO TIMES A DAY, Disp: 180 Tablet, Rfl: 3    atorvastatin (LIPITOR) 10 mg tablet, TAKE 1 TABLET BY MOUTH DAILY AT BEDTIME, Disp: 90 Tablet, Rfl: 3    amLODIPine (NORVASC) 10 mg tablet, TAKE 1 TABLET BY MOUTH DAILY, Disp: 90 Tablet, Rfl: 3    losartan-hydroCHLOROthiazide (HYZAAR) 100-25 mg per tablet, TAKE 1 TABLET BY MOUTH DAILY, Disp: 90 Tablet, Rfl: 3    levothyroxine (SYNTHROID) 50 mcg tablet, Take 1 Tablet by mouth every morning., Disp: 90 Tablet, Rfl: 3    metFORMIN (GLUCOPHAGE) 500 mg tablet, Take 2 Tablets by mouth two (2) times a day., Disp: 360 Tablet, Rfl: 3    sertraline (ZOLOFT) 50 mg tablet, Take 1 Tablet by mouth daily. , Disp: 90 Tablet, Rfl: 3    fluticasone-umeclidinium-vilanterol (Trelegy Ellipta) 100-62.5-25 mcg inhaler, Take 1 Puff by inhalation daily. , Disp: 60 Each, Rfl: 5    albuterol (ProAir HFA) 90 mcg/actuation inhaler, Take 1 Puff by inhalation every four (4) hours as needed for Wheezing., Disp: 18 g, Rfl: 0    methocarbamoL (Robaxin) 500 mg tablet, Take 1 Tablet by mouth two (2) times daily as needed for Muscle Spasm(s). , Disp: 30 Tablet, Rfl: 2    cyanocobalamin 1,000 mcg tablet, Take 1,000 mcg by mouth daily. Patient states she takes 2,000 mg tablet a day, Disp: , Rfl:     aspirin delayed-release 81 mg tablet, Take  by mouth daily. , Disp: , Rfl:        ASSESSMENT:  Luis Fortune is a 73 y.o. female with PMH of diabetes, hypertension, HLP, COPD and RLS. She presented to the ED with chief complaint of chest pain. She was playing with her granddaughter in the pool (with moderate exertion), sharp in nature, rated 10/10, lasted few seconds but multiple episodes. Radiates to her back, worsened with cough/ sneeze. Patient also rports frequent sensation of food stucking in food track, that spontaneously passes or regurgitate out. No food impaction currently. However, in free-standing ED, she had an episode of pressure like mid-sternal chest pain, relieved with SL nitro. Also associated with nausea without vomiting, no diaphoresis.      In the ED, noted hypertensive. Troponin negative x2. Admitted due to elevated HEART score. # Chest pain  - Suspect gysphagia/GERD based on history  - However high SURI score   - Consulted cardiology  - SL nitro PRN  - ECHO ordered  - ASCVD score 20%, increased atorvastatin to 40mg  - trop x 2 negative     # Dysphagia  - Consulted GI  - Trial of GI cocktail.   - Anticipate EGD     # Diabetes  - hold home medications. - POC + correctional insulin      # Hypertension  - Continue home medications: norvasc, hctz, losartan  - BP currently acceptable  - IV labetalol PRN       # COPD  - Not in exacerbation  - Continue home medications: symbicort and spiriva   - Educated about the role of albuterol as rescue inhaler and trilegy as maintenance.      # Neuropathy  - Continue home medications.       # Restless leg  - Continue home medications: requip      DNR  Dvt Prophylaxis lovenox  GI Prophylaxis protonix  Discharge barriers:  -GI eval  -Cardiology eval  -Clinical improvement  ~24 hrs discharge  -echo pending        Above treatment plan reviewed and discussed with patient in detail at bedside, all questions answered. Care Plan discussed with: Interdisciplinary team    Total time spent with patient: 35 minutes.     Miriam Brock NP

## 2022-07-14 NOTE — H&P
History and Physical    Patient: Boris Decker MRN: [id number]  SSN: [id number]    YOB: 1950  Age: 73 y.o. Sex: female      Subjective:      Boris Decker is a 73 y.o. female with PMH of diabetes, hypertension, HLP, COPD and RLS. She presented to the ED with chief complaint of chest pain. She was playing with her granddaughter in the pool (with moderate exertion), sharp in nature, rated 10/10, lasted few seconds but multiple episodes. Radiates to her back, worsened with cough/ sneeze. Patient also rports frequent sensation of food stucking in food track, that spontaneously passes or regurgitate out. No food impaction currently. However, in free-standing ED, she had an episode of pressure like mid-sternal chest pain, relieved with SL nitro. Also associated with nausea without vomiting, no diaphoresis. In the ED, noted hypertensive. Troponin negative x2. Admitted due to elevated HEART score. Past Medical History:   Diagnosis Date    Bacterial skin infection of trunk 9/2/2020    Chronic obstructive pulmonary disease ([address] 3/31/2022    Colon polyp     DM (diabetes mellitus) ([facility] [address] 5/24/2011    Environmental allergies     HTN (hypertension) 5/24/2011    Hyperlipidemia 5/24/2011    Panic attacks     RLS (restless legs syndrome) 5/24/2011     Past Surgical History:   Procedure Laterality Date    ENDOSCOPY, COLON, DIAGNOSTIC  3/20/12    OK. Rep 10 yrs. Syl Carter)    HX APPENDECTOMY      HX CHOLECYSTECTOMY        Family History   Problem Relation Age of Onset    Thyroid Disease Mother      Social History     Tobacco Use    Smoking status: Never Smoker    Smokeless tobacco: Never Used    Tobacco comment: has second hand smoking exposure for long time over the years   Substance Use Topics    Alcohol use: Yes     Comment: occ      Prior to Admission medications    Medication Sig Start Date End Date Taking?  Authorizing Provider   LORazepam (ATIVAN) 0.5 mg tablet TAKE 1-2 TABS BY MOUTH TWICE A DAY AS NEEDED 7/3/22   Glenn Canada MD   cyclobenzaprine (FLEXERIL) 5 mg tablet Take 1-2 Tablets by mouth three (3) times daily as needed for Muscle Spasm(s). 5/31/22   Glenn Canada MD   gabapentin (NEURONTIN) 300 mg capsule Take 1 Capsule by mouth three (3) times daily. Max Daily Amount: 900 mg. 5/31/22   Glenn Canada MD   rOPINIRole (REQUIP) 4 mg tab TAB TAKE 1 TABLET BY MOUTH TWO TIMES A DAY 5/19/22   Glenn Canada MD   atorvastatin (LIPITOR) 10 mg tablet TAKE 1 TABLET BY MOUTH DAILY AT BEDTIME 4/26/22   Glenn Canada MD   amLODIPine (NORVASC) 10 mg tablet TAKE 1 TABLET BY MOUTH DAILY 4/26/22   Glenn Canada MD   losartan-hydroCHLOROthiazide [facility]) 100-25 mg per tablet TAKE 1 TABLET BY MOUTH DAILY 4/26/22   Glenn Canada MD   levothyroxine (SYNTHROID) 50 mcg tablet Take 1 Tablet by mouth every morning. 4/10/22   Glenn Canada MD   metFORMIN (GLUCOPHAGE) 500 mg tablet Take 2 Tablets by mouth two (2) times a day. 3/31/22   Glenn Canada MD   sertraline (ZOLOFT) 50 mg tablet Take 1 Tablet by mouth daily. 3/31/22   Glenn Canada MD   fluticasone-umeclidinium-vilanterol (Trelegy Ellipta) 100-62.5-25 mcg inhaler Take 1 Puff by inhalation daily. 3/31/22   Glenn Canada MD   albuterol (ProAir HFA) 90 mcg/actuation inhaler Take 1 Puff by inhalation every four (4) hours as needed for Wheezing. 12/6/21   Javed Burt NP   methocarbamoL (Robaxin) 500 mg tablet Take 1 Tablet by mouth two (2) times daily as needed for Muscle Spasm(s). 11/1/21   Glenn Canada MD   cyanocobalamin 1,000 mcg tablet Take 1,000 mcg by mouth daily. Patient states she takes 2,000 mg tablet a day    Provider, Historical   meloxicam (MOBIC) 7.5 mg tablet TAKE 1 TABLET BY MOUTH DAILY 6/17/20   Glenn Canada MD   aspirin delayed-release 81 mg tablet Take  by mouth daily.     Provider, Historical No Known Allergies    Review of Systems:   Constitutional: No fevers, No chills, No fatigue, No weakness  Eyes: No visual disturbance  Ears, Nose, Mouth, Throat, and Face: No nasal congestion, No sore throat  Respiratory: No cough, No sputum, No wheezing, No SOB  Cardiovascular: See HPI  Gastrointestinal: No nausea, No vomiting, No diarrhea, No constipation, No abdominal pain  Genitourinary: No frequency, No dysuria, No hematuria  Integument/Breast: No rash, No skin lesion(s), No dryness  Musculoskeletal: No arthralgias, No neck pain, No back pain  Neurological: No headaches, No dizziness, No confusion,  No seizures  Behavioral/Psychiatric: No anxiety, No depression      Objective:     Vitals:    07/13/22 1658 07/13/22 1834 07/13/22 1953 07/13/22 2028   BP: 133/84 (!) 146/94 121/78 (!) 173/86   Pulse: 78 76 81 82   Resp: 18  20    Temp:   98.7 °F (37.1 °C)    SpO2:  98% 94% 97%   Weight:       Height:            Physical Exam:   General: alert, cooperative, no distress  Eye: conjunctivae/corneas clear. PERRL, EOM's intact. Throat and Neck: normal and no erythema or exudates noted. No mass   Lung: clear to auscultation bilaterally. No wheezing  Heart: regular rate and rhythm. Mild tenderness on palpation. BACK (lower thoracic) tenderness on palaption. Abdomen: soft, non-tender. Bowel sounds normal. No masses,  Extremities:  able to move all extremities normal, atraumatic  Skin: Normal.  Neurologic: AOx3. Motor function and sensation grossly intact.   Psychiatric: non focal    Recent Results (from the past 24 hour(s))   CBC WITH AUTOMATED DIFF    Collection Time: 07/13/22  1:00 PM   Result Value Ref Range    WBC 9.1 3.6 - 11.0 K/uL    RBC 4.28 3.80 - 5.20 M/uL    HGB 11.6 11.5 - 16.0 g/dL    HCT 35.9 35.0 - 47.0 %    MCV 83.9 80.0 - 99.0 FL    MCH 27.1 26.0 - 34.0 PG    MCHC 32.3 30.0 - 36.5 g/dL    RDW 13.1 11.5 - 14.5 %    PLATELET 000 876 - 418 K/uL    MPV 10.7 8.9 - 12.9 FL    NEUTROPHILS 55 32 - 75 % LYMPHOCYTES 32 12 - 49 %    MONOCYTES 9 5 - 13 %    EOSINOPHILS 3 0 - 7 %    BASOPHILS 1 0 - 1 %    IMMATURE GRANULOCYTES 0 0.0 - 0.5 %    ABS. NEUTROPHILS 5.0 1.8 - 8.0 K/UL    ABS. LYMPHOCYTES 2.9 0.8 - 3.5 K/UL    ABS. MONOCYTES 0.8 0.0 - 1.0 K/UL    ABS. EOSINOPHILS 0.3 0.0 - 0.4 K/UL    ABS. BASOPHILS 0.1 0.0 - 0.1 K/UL    ABS. IMM. GRANS. 0.0 0.00 - 0.04 K/UL    DF AUTOMATED     METABOLIC PANEL, COMPREHENSIVE    Collection Time: 07/13/22  1:00 PM   Result Value Ref Range    Sodium 139 136 - 145 mmol/L    Potassium 3.6 3.5 - 5.1 mmol/L    Chloride 99 97 - 108 mmol/L    CO2 31 21 - 32 mmol/L    Anion gap 9 5 - 15 mmol/L    Glucose 142 (H) 65 - 100 mg/dL    BUN 18 6 - 20 mg/dL    Creatinine 0.83 0.55 - 1.02 mg/dL    BUN/Creatinine ratio 22 (H) 12 - 20      GFR est AA >60 >60 ml/min/1.73m2    GFR est non-AA >60 >60 ml/min/1.73m2    Calcium 10.1 8.5 - 10.1 mg/dL    Bilirubin, total 0.5 0.2 - 1.0 mg/dL    AST (SGOT) 27 15 - 37 U/L    ALT (SGPT) 35 12 - 78 U/L    Alk. phosphatase 128 (H) 45 - 117 U/L    Protein, total 8.0 6.4 - 8.2 g/dL    Albumin 3.9 3.5 - 5.0 g/dL    Globulin 4.1 (H) 2.0 - 4.0 g/dL    A-G Ratio 1.0 (L) 1.1 - 2.2     TROPONIN-HIGH SENSITIVITY    Collection Time: 07/13/22  1:00 PM   Result Value Ref Range    Troponin-High Sensitivity 7 0 - 51 ng/L   TROPONIN-HIGH SENSITIVITY    Collection Time: 07/13/22  2:40 PM   Result Value Ref Range    Troponin-High Sensitivity 8 0 - 51 ng/L       XR Results (maximum last 3): Results from [place] encounter on 07/13/22    XR CHEST SNGL V    Narrative  EXAM: Portable CXR. 1305 hours. INDICATION: chest pain    FINDINGS:  The lungs appear clear. Heart is normal in size. There is no pulmonary edema. There is no evident pneumothorax or pleural effusion. Impression  No Acute Disease. Results from [place] encounter on 12/05/21    XR CHEST PORT    Narrative  Shortness of breath. Comparison chest x-ray 10/28/2021.     Findings: Single frontal view of the chest. Normal cardiomediastinal silhouette. No vascular congestion or pulmonary edema. The lungs are well-inflated. No  infiltrate, effusion, pneumothorax. No free air under the hemidiaphragms. Bilateral AC joint degenerative disease and degenerative disc disease. Impression  No acute findings. Results from [place] encounter on 10/28/21    XR ABD ACUTE W 1 V CHEST    Narrative  4 views acute abdomen series    HISTORY: Abdominal pain    COMPARISON: Chest 1/4/2021    Lungs are clear. No pleural fluid. Heart is normal size. Aortic uncoiling. Surgical clips in the right upper quadrant. There is a round calcification in  the left upper quadrant also present on prior study. There is multiple stool and  gas in the large bowel. No small bowel distention. A small lower pole left  kidney calculus is questioned. There is a dextroconvex thoracolumbar scoliotic  curvature with spondylosis. Impression  No focal acute abnormality. Evidence for previous cholecystectomy. Suspect left kidney calculus. Round calcification in the left upper quadrant is present on previous study and  could represent a calcified splenic artery aneurysm, upper pole kidney calculus  or other. Consider CT if needed for additional evaluation. CT Results (maximum last 3): Results from [place] encounter on 05/23/22    CT CHEST WO CONT    Narrative  Indication: Lung nodule. Dose reduction: All CT scans done at this facility are performed using dose  reduction optimization techniques as appropriate to a performed exam including  the following: Automated exposure control, adjustments of the mA and/or kV  according to patient size, or use of iterative reconstruction technique. CT chest unenhanced 5/26/2022. Comparison 28 October 2021.    4 mm noncalcified lateral right upper lobe unchanged pulmonary nodule.   4 mm noncalcified inferior of the lateral right lower lobe unchanged. Punctate right lower lobe calcified granuloma unchanged. No adenopathy. No pleural or pericardial effusion. Mild atheromatous calcification of thoracic aorta without aneurysm. 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 Thoracic spondylosis. Impression  Unchanged subcentimeter right pulmonary nodules. Results from Hospital Encounter encounter on 10/28/21    CTA CHEST ABD PELV W WO CONT    Narrative  EXAM: CTA CHEST ABD PELV W WO CONT    INDICATION: back pain and chest pain    COMPARISON: None. CONTRAST: 100 mL of Isovue-370    TECHNIQUE:  Multislice helical CT was performed for the chest, abdomen, and pelvis, from the  thoracic inlet to the iliac crest prior to and during uneventful rapid bolus  intravenous contrast administration. Contiguous axial images were reconstructed  and lung and soft tissue windows were generated. Coronal, sagittal, and MIP  reformations were generated to facilitate diagnosis. CT dose reduction was achieved through use of a standardized protocol tailored  for this examination and automatic exposure control for dose modulation. FINDINGS:  CT angiogram findings: Aorta: Mild-to-moderate atherosclerotic disease without aneurysm or acute  finding such as dissection. Great vessels: Except for hypoplastic right vertebral artery, the great vessels  are patent. The celiac axis, SMA, UGO, and bilateral renal arteries are patent. There is  replaced right hepatic artery arising from the proximal SMA, and anatomic  variant. 1.6 x 1.4 cm calcified aneurysm arising from the splenic artery at the  hilum. Widely patent bilateral iliac and imaged femoral arteries. CT findings:  Lungs and airways: Pulmonary nodule up to 4 to 5 mm in the right upper lobe  (series 502, image 76). Central airways are patent. Mediastinum: No mediastinal or hilar or axillary adenopathy is appreciated. Pleural space: No pneumothorax or pleural effusion.     Chest wall and bones: No aggressive bone lesion. Advanced degenerative changes  in the spine. Liver, biliary tree, and gallbladder: No focal enhancing hepatic lesion. No  biliary ductal dilatation. Prior cholecystectomy. Spleen: within normal limits. Pancreas: No mass or ductal dilatation. Kidneys and adrenals: No renal mass, obstructing calculus, or hydronephrosis. Unremarkable adrenals. Bowels: No bowel wall thickening or dilatation. Colonic diverticulosis. Appendix  is normal.    Peritoneum and retroperitoneum: No ascites or pneumoperitoneum. No  lymphadenopathy or aortic aneurysm. Pelvis: No mass or calculus. Abdominal wall and bones: No aggressive bone lesion. Advanced degenerative  changes in the spine. No abdominal wall mass. Impression  No acute vascular findings; 1.6 x 1.4 cm calcified aneurysm arising from the  splenic artery at the hilum. Incidental finding of 4 to 5 mm pulmonary nodule in the right upper lobe. This  can be followed up with CT chest in 12 months. Results from [place] encounter on 01/03/20    CT LOW EXT RT WO CONT    Narrative  EXAM: CT LOW EXT RT WO CONT    INDICATION: Increasing right hip pain since trip and fall 2 days ago. COMPARISON: Pelvis and right hip views earlier this afternoon at 12:15 PM.    TECHNIQUE: Helical CT of the bony pelvis to include the right hip with coronal  and sagittal reformats. Images reviewed in soft tissue and bone windows. CT  dose reduction was achieved through the use of a standardized protocol tailored  for this examination and automatic exposure control for dose modulation. Please  charge as CT right lower extremity without contrast.    CONTRAST: None. FINDINGS: Bones: Bone mineralization is within normal limits. No acute fracture. Degenerative cyst in the anterior, inferior right acetabulum measures 18 mm in  craniocaudal dimension. No aggressive bone lesion. Joint fluid: None.     Articulations: Minimal right hip osteoarthritis. Mild bilateral sacroiliac joint  osteoarthritis. Lumbar spine degenerative disc disease and facet arthrosis are  partially imaged. Tendons: Moderate right and mild left calcific tendinosis of the hamstring  origins. Muscles: No intramuscular hematoma. No focal atrophy. Soft tissue mass: None. No acute process in the pelvis. Impression  IMPRESSION:  1. No fracture. 2. Moderate right hamstring origin calcific tendinosis. 3. Minimal right hip osteoarthritis. 18 mm degenerative cyst in the anterior,  inferior right acetabulum. MRI Results (maximum last 3): Results from [place] encounter on 09/22/16    MRI ORB FACE NECK W WO CONT    Narrative  INDICATION:  Temporal pallor of optic disc, right    COMPARISON:  None    TECHNIQUE:  MR imaging of the brain was performed with particular attention to  the orbits including sagittal T1, axial T1, T2, FLAIR, GRE, DWI/ADC; coronal T2  with fat saturation;  multiplanar pre and post T1of the whole brain and orbits  with and without fat saturation utilizing 7.5 mL Gadavist.    FINDINGS:    The ventricles are midline without hydrocephalus. There is no acute intra or  extra-axial fluid collection. There is minimal periventricular and subcortical  white matter disease suggesting chronic small vessel ischemic changes. There is  no acute infarction. The major intracranial vascular flow-voids are patent. There is slight asymmetric increase in T2 signal within the right optic nerve  particularly throughout the intraorbital segment but also likely in the  prechiasmatic segment, which is also slightly smaller than the more  normal-appearing left optic nerve. There is no associated enhancement of the  optic nerve. The extraocular muscles and intraorbital fat are normal.  The optic  chiasm is normal.  There is no suprasellar mass.     Impression  IMPRESSION:  Subtle increased T2 signal throughout the right optic nerve which is also  slightly smaller than the more normal-appearing left optic nerve. However there  is no associated enhancement, intraorbital inflammation, or evidence of mass. This could be related to chronic optic nerve inflammation/atrophy, with no acute  abnormality. Nuclear Medicine Results (maximum last 3): No results found for this or any previous visit. US Results (maximum last 3): Results from Hospital Encounter encounter on 03/09/22    US BREAST RT LIMITED=<3 QUAD    Narrative  STUDY:  Bilateral Diagnostic Digital Mammography with tomosynthesis and right  Ultrasound    INDICATION:  Right palpable    VIEWS OBTAINED: Bilateral CC, MLO, right CC and MLO spots, right ML with  tomosynthesis    COMPARISON:  2021, 2019, 2012    BREAST COMPOSITION: There are scattered fibroglandular densities. FINDINGS:    Mammography:  Bilateral digital diagnostic mammography with tomosynthesis was  performed, and is interpreted in conjunction with a computer assisted detection  (CAD) system. Underlying the palpable marker in the right upper inner quadrant  there is no focal mammographic abnormality. No suspicious masses or  microcalcifications bilaterally. No suspicious mammographic change. Ultrasound: The patient was scanned by the technologist and the physician  throughout the area of palpable concern spanning from the retroareolar right  breast through the 12:00 breast. No suspicious solid or cystic masses are seen. Impression  1. BI-RADS Assessment Category 1: Negative. Recommendations:  Continue annual screening mammography. The patient has been notified of these results and recommendations.       Results from Hospital Encounter encounter on 03/10/21    US BREAST LT LIMITED=<3 QUAD    Narrative  PROCEDURE: Left DIAGNOSTIC MAMMOGRAPHY with tomosynthesis and left BREAST  ULTRASOUND    Indication: Screening call back    Prior Comparison Dates:  2021, 2019, 2012    Views Performed: Left CC and MLO spots, left ML with tomosynthesis    BREAST COMPOSITION: There are scattered fibroglandular densities. REPORT:  MAMMOGRAM: Further evaluation was performed for a left breast focal asymmetry. There is an unchanged focal asymmetry in the left subareolar breast centrally  and laterally, similar to 2019 in appearance. ULTRASOUND: The patient was scanned by the technologist and the physician  extensively throughout the subareolar and periareolar left breast. There are  fluid and debris-filled ducts underlying the left nipple. No evidence of a  suspicious solid or cystic mass. The patient denies retraction of the  nipple/nipple changes. Impression  1. BI-RADS Assessment Category 2: Benign finding. Fluid and debris-filled ducts  in the left retroareolar breast.      Recommendations:  Continue annual screening mammography. These findings were relayed to the patient at the time of the study. Results from [place] encounter on 03/14/12    US ABDOMEN COMPLETE    Narrative  **Final Report**      ICD Codes / Adm. Diagnosis: 789.00   / ABDOMINAL PAIN, UNSPECIFIED SI  Examination:  US ABDOMEN  - [id number] - Mar 14 2012  8:41AM  Accession No:  [id number]  Reason:  abd pain      REPORT:  INDICATION: Abdominal pain    COMPARISON: 05/10/2006    FINDINGS: Routine ultrasound images of the abdomen were obtained. The liver  is normal in size. It demonstrates increased echogenicity. No focal hepatic  mass or intrahepatic biliary dilatation is seen. The common bile duct is  normal, measuring 3 mm in diameter. The gallbladder is surgically absent. The visualized portion of the pancreas demonstrates no abnormality. The  pancreatic tail is obscured by overlying bowel gas. The spleen measures 7.1  cm in length and demonstrates no focal mass. The right kidney measures 12.1  cm in length. The left kidney measures 11.3 cm in length.  Both kidneys  demonstrate normal cortical echogenicity and no evidence of stone, mass, or  hydronephrosis. The proximal abdominal aorta is normal in caliber. The  distal abdominal aorta and common iliac artery origins are obscured by  overlying bowel gas . The inferior vena cava is patent. IMPRESSION:  1. Increased hepatic echogenicity suggests hepatic parenchymal disease, such  as hepatic steatosis. This finding is unchanged when compared to the prior  study. 2. Status post cholecystectomy. Signing/Reading Doctor: Mari Darnell ([id number])  Approved: Mari Darnell ([id number])  03/14/2012    Pt's SURI Score = 4    SURI Score Calculation (1 point for each):  - Age >= 72  - Aspirin use in the last 7 days   - At least 2 angina episodes within the last 24hrs  - ST changes of at least 0.5mm on admission EKG  - Elevated serum cardiac biomarkers  - Known Coronary Artery Disease (CAD) (coronary stenosis >= 50%)  - At least 3 risk factors for CAD:  Hypertension -> 140/90 or on antihypertensives, Cigarette smoking, HDL < 40, Diabetes, Family history of premature CAD (CAD in male first-degree relative, or father less than 54, or female first-degree relative or mother less than 72). Score Interpretation:  % risk at 14 days of: all-cause mortality, new or recurrent MI, or severe recurrent ischemia requiring urgent revascularization.   Score of 0-1 = 4.7% risk  Score of 2 = 8.3% risk  Score of 3 = 13.2% risk  Score of 4 = 19.9% risk  Score of 5 = 26.2% risk  Score of 6-7 = at least 40.9% risk    Two or More Episodes of Severe Angina within 24 Hours: 1  Elevated Cardiac Markers: 0  ST Changes > 0.5 mm: 0  Aspirin Use in the Past 7 Days: 1  Age 65+: 1  3+ CAD Risk Factors: 1  CAD Stenosis >= 50%: 0  SURI Risk Score (Calculated): 4       Assessment and plan:   # Chest pain  - Suspect MSK or gysphagia/GERD based on history  - However high SURI score, admit for obs  - Consult cardiology  - SL nitro PRN  - ECHO   - ASCVD score 20%, increase atorvastatin to 40mg    # Dysphagia  - Consult GI  - Trial of GI cocktail. # Diabetes  - hold home medications. - POC + correctional insulin     # Hypertension  - Continue home medications. - IV labetalol PRN      # COPD  - Not in exacerbation  - Continue home medications.    - Educated about the role of albuterol as rescue inhaler and trilegy as maintenance. # Neuropathy  - Continue home medications. # Restless leg  - Continue home medications.       # Discussed with patient/POA/family, DNR    # Medication list reviewed on Epic and with patient/family        Signed By: Lanette Hampton MD     July 13, 2022

## 2022-07-14 NOTE — ACP (ADVANCE CARE PLANNING)
Advance Care Planning   Healthcare Decision Maker:       Primary Decision Maker: Jordi Fung - [facility] - [phone]

## 2022-07-14 NOTE — PROGRESS NOTES
Received pt at 1520. Performed 2 person skin assessment with Aquilino Park RN. No pressure wounds. Various scratches to left side of body.

## 2022-07-14 NOTE — PROGRESS NOTES
Reason for Admission:  Chest Pain                     RUR Score:   N/A                  Plan for utilizing home health:  None @ this time/uses no DME. PCP: First and Last name:  Kristal Encinas MD     Name of Practice:    Are you a current patient: Yes/No: Yes   Approximate date of last visit:    Can you participate in a virtual visit with your PCP: Yes/Call/Has cell phone. Current Advanced Directive/Advance Care Plan: DNR      Healthcare Decision Maker:              Primary Decision Maker: Anna Single - Daughter - [phone]                  Transition of Care Plan: D/C Plan is home - daughter Kalpesh Comment with her. Pt will need cab/transportation home. Call Rxs into [facility] upon discharge.

## 2022-07-14 NOTE — ED NOTES
Past Medical History:   Diagnosis Date    Bacterial skin infection of trunk 9/2/2020    Chronic obstructive pulmonary disease ([facility] [address] 3/31/2022    Colon polyp     DM (diabetes mellitus) ([facility] [address] 5/24/2011    Environmental allergies     HTN (hypertension) 5/24/2011    Hyperlipidemia 5/24/2011    Panic attacks     RLS (restless legs syndrome) 5/24/2011     Past Surgical History:   Procedure Laterality Date    ENDOSCOPY, COLON, DIAGNOSTIC  3/20/12    OK. Rep 10 yrs. Rosemary Schulte)    HX APPENDECTOMY      HX CHOLECYSTECTOMY       Care assumed and bedside SBAR report endorsed on 73 y.o. female with PMH of diabetes, hypertension, HLP, COPD and RLS. She presented to the ED with chief complaint of chest pain. She was playing with her granddaughter in the pool (with moderate exertion), sharp in nature, rated 10/10, lasted few seconds but multiple episodes. Radiates to her back, worsened with cough/ sneeze. Patient also rports frequent sensation of food stucking in food track, that spontaneously passes or regurgitate out. No food impaction currently. However, in free-standing ED, she had an episode of pressure like mid-sternal chest pain, relieved with SL nitro. Also associated with nausea without vomiting. Transferred from [facility], [address] [place] [address], alert and oriented x3, pain currently within manageable limits, IV patent, plan of care reinforced, bed in lowest position, side rails up x2, call bell within reach, will continue to monitor.

## 2022-07-14 NOTE — PROGRESS NOTES
Admission Medication Reconciliation:    Information obtained from:  Patient    Comments/Recommendations: Reviewed PTA medications and patient's allergies. Removed sertraline 50 mg  Removed cyclobenzaprine 5 mg  Removed methocarbamol 500 mg        Allergies:  Patient has no known allergies. Significant PMH/Disease States:   Past Medical History:   Diagnosis Date    Bacterial skin infection of trunk 9/2/2020    Chronic obstructive pulmonary disease ([facility] [address] 3/31/2022    Colon polyp     DM (diabetes mellitus) ([facility] [address] 5/24/2011    Environmental allergies     HTN (hypertension) 5/24/2011    Hyperlipidemia 5/24/2011    Panic attacks     RLS (restless legs syndrome) 5/24/2011     Chief Complaint for this Admission:    Chief Complaint   Patient presents with    Chest Pain     Prior to Admission Medications:   Prior to Admission Medications   Prescriptions Last Dose Informant Patient Reported? Taking? LORazepam (ATIVAN) 0.5 mg tablet  Self No Yes   Sig: TAKE 1-2 TABS BY MOUTH TWICE A DAY AS NEEDED   Patient taking differently: Take 1 mg by mouth daily as needed for Anxiety. albuterol (PROVENTIL HFA, VENTOLIN HFA, PROAIR HFA) 90 mcg/actuation inhaler  Self Yes Yes   Sig: Take 2 Puffs by inhalation every four (4) hours as needed for Wheezing. amLODIPine (NORVASC) 10 mg tablet  Self No Yes   Sig: TAKE 1 TABLET BY MOUTH DAILY   aspirin delayed-release 81 mg tablet  Self Yes Yes   Sig: Take 81 mg by mouth daily. atorvastatin (LIPITOR) 10 mg tablet  Self No Yes   Sig: TAKE 1 TABLET BY MOUTH DAILY AT BEDTIME   cyanocobalamin, vitamin B-12, 2,000 mcg tab  Self Yes Yes   Sig: Take 2,000 mcg by mouth daily. fluticasone-umeclidinium-vilanterol (Trelegy Ellipta) 100-62.5-25 mcg inhaler  Self No Yes   Sig: Take 1 Puff by inhalation daily. gabapentin (NEURONTIN) 300 mg capsule  Self No Yes   Sig: Take 1 Capsule by mouth three (3) times daily.  Max Daily Amount: 900 mg.   levothyroxine (SYNTHROID) 50 mcg tablet  Self No Yes Sig: Take 1 Tablet by mouth every morning. losartan-hydroCHLOROthiazide (Hyzaar) 100-25 mg per tablet  Self Yes Yes   Sig: Take 1 Tablet by mouth daily. meloxicam (MOBIC) 7.5 mg tablet  Self No Yes   Sig: TAKE 1 TABLET BY MOUTH DAILY   metFORMIN (GLUCOPHAGE) 500 mg tablet  Self No Yes   Sig: Take 2 Tablets by mouth two (2) times a day.    rOPINIRole (REQUIP) 4 mg tab TAB  Self No Yes   Sig: TAKE 1 TABLET BY MOUTH TWO TIMES A DAY      Facility-Administered Medications: None       Rivka Lyle

## 2022-07-14 NOTE — CONSULTS
Cardiology Consult    NAME: Judith Baez   :  1950   MRN:  [id number]     Date/Time:  2022 10:43 AM    Patient PCP: Jakub Brennan MD  ________________________________________________________________________     Assessment:   Atypical pleuritic chest pain, resolved  Hypertension  Hyperlipidemia  COPD  Type II diabetes      Plan:   Cardiac enzymes are negative x 2  No evidence of MI/ischemia  D-dimer pending  May discharge if echo unremarkable      []        High complexity decision making was performed        Subjective:   CHIEF COMPLAINT: Chest pain      REASON FOR CONSULT: Chest pain      HISTORY OF PRESENT ILLNESS:     Judith Baez is a 73 y.o. WHITE/NON- female who has a history of type 2 diabetes, hypertension, hyperlipidemia, and COPD related to asthma. Patient is not a smoker. She presented to the ER with 2 days of chest pain described as a sharp pain in the anterior chest that was worse with deep breathing, movement, coughing; some radiation to her back. Chest pain came on while she was playing with her granddaughter in the pool 2 days ago. No associated injury. No trauma. Pain is currently completely resolved. No shortness of breath. No nausea or vomiting. History of ischemic heart disease. Reports compliance with her prescribed meds. No fever or cough. No sick contacts. Past Medical History:   Diagnosis Date    Bacterial skin infection of trunk 2020    Chronic obstructive pulmonary disease ([facility] [address] 3/31/2022    Colon polyp     DM (diabetes mellitus) ([facility] [address] 2011    Environmental allergies     HTN (hypertension) 2011    Hyperlipidemia 2011    Panic attacks     RLS (restless legs syndrome) 2011      Past Surgical History:   Procedure Laterality Date    ENDOSCOPY, COLON, DIAGNOSTIC  3/20/12    OK. Rep 10 yrs.  [place])    HX APPENDECTOMY      HX CHOLECYSTECTOMY       No Known Allergies   Meds:  See below  Social History     Tobacco Use    Smoking status: Never Smoker    Smokeless tobacco: Never Used    Tobacco comment: has second hand smoking exposure for long time over the years   Substance Use Topics    Alcohol use: Yes     Comment: occ      Family History   Problem Relation Age of Onset    Thyroid Disease Mother        REVIEW OF SYSTEMS:     []         Unable to obtain  ROS due to ---   [x]         Total of 12 systems reviewed as follows:    Constitutional: negative fever, negative chills, negative weight loss  Eyes:   negative visual changes  ENT:   negative sore throat, tongue or lip swelling  Respiratory:  negative cough, negative dyspnea  Cards:  + chest pain  GI:   negative for nausea, vomiting, diarrhea, and abdominal pain  Genitourinary: negative for frequency, dysuria  Integument:  negative for rash   Hematologic:  negative for easy bruising and gum/nose bleeding  Musculoskel: negative for myalgias,  back pain  Neurological:  negative for headaches, dizziness, vertigo, weakness  Behavl/Psych: negative for feelings of anxiety, depression     Pertinent Positives include :    Objective:      Physical Exam:    Last 24hrs VS reviewed since prior progress note. Most recent are:    Visit Vitals  /67   Pulse 77   Temp 98.7 °F (37.1 °C)   Resp 12   Ht 5' 2\" (1.575 m)   Wt 83.9 kg (185 lb)   SpO2 91% Comment: sats range from 91 to 93 c hx:COPD. Denies sob   BMI 33.84 kg/m²     No intake or output data in the 24 hours ending 07/14/22 1043     General Appearance: Well developed, alert, no acute distress. Ears/Nose/Mouth/Throat: Moist mucosa  Neck: Supple. JVP within normal limits. Carotids good upstrokes  Chest: Lungs clear to auscultation bilaterally. Cardiovascular: Regular rate and rhythm, S1S2 normal, soft systolic murmur  Abdomen: Soft, non-tender, bowel sounds are active. Extremities: No edema bilaterally. distal pulses +1. Skin: Warm and dry.   Neuro: Alert and oriented x3, normal speech; follows simple commands  Psychiatric: Cooperative; appropriate    []         Post-cath site without hematoma, bruit, tenderness, or thrill. Distal pulses intact. Data:      Telemetry: Normal sinus    EKG: Normal sinus rhythm, no ischemia  []  No new EKG for review. Prior to Admission medications    Medication Sig Start Date End Date Taking? Authorizing Provider   meloxicam (MOBIC) 7.5 mg tablet TAKE 1 TABLET BY MOUTH DAILY 7/13/22   Rei Bettencourt MD   LORazepam (ATIVAN) 0.5 mg tablet TAKE 1-2 TABS BY MOUTH TWICE A DAY AS NEEDED 7/3/22   Rei Bettencourt MD   cyclobenzaprine (FLEXERIL) 5 mg tablet Take 1-2 Tablets by mouth three (3) times daily as needed for Muscle Spasm(s). 5/31/22   Rei Bettencourt MD   gabapentin (NEURONTIN) 300 mg capsule Take 1 Capsule by mouth three (3) times daily. Max Daily Amount: 900 mg. 5/31/22   Rei Bettencourt MD   rOPINIRole (REQUIP) 4 mg tab TAB TAKE 1 TABLET BY MOUTH TWO TIMES A DAY 5/19/22   Rei Bettencourt MD   atorvastatin (LIPITOR) 10 mg tablet TAKE 1 TABLET BY MOUTH DAILY AT BEDTIME 4/26/22   Rei Bettencourt MD   amLODIPine (NORVASC) 10 mg tablet TAKE 1 TABLET BY MOUTH DAILY 4/26/22   Rei Bettencourt MD   losartan-hydroCHLOROthiazide [facility]) 100-25 mg per tablet TAKE 1 TABLET BY MOUTH DAILY 4/26/22   Rei Bettencourt MD   levothyroxine (SYNTHROID) 50 mcg tablet Take 1 Tablet by mouth every morning. 4/10/22   Rei Bettencourt MD   metFORMIN (GLUCOPHAGE) 500 mg tablet Take 2 Tablets by mouth two (2) times a day. 3/31/22   eRi Bettencourt MD   sertraline (ZOLOFT) 50 mg tablet Take 1 Tablet by mouth daily. 3/31/22   Rei Bettencourt MD   fluticasone-umeclidinium-vilanterol (Trelegy Ellipta) 100-62.5-25 mcg inhaler Take 1 Puff by inhalation daily.  3/31/22   Rei Bettencourt MD   albuterol (ProAir HFA) 90 mcg/actuation inhaler Take 1 Puff by inhalation every four (4) hours as needed for Wheezing. 12/6/21   Wiley Cardona NP   methocarbamoL (Robaxin) 500 mg tablet Take 1 Tablet by mouth two (2) times daily as needed for Muscle Spasm(s). 11/1/21   Moraima Aguirre MD   cyanocobalamin 1,000 mcg tablet Take 1,000 mcg by mouth daily. Patient states she takes 2,000 mg tablet a day    Provider, Historical   aspirin delayed-release 81 mg tablet Take  by mouth daily. Provider, Historical       Recent Results (from the past 24 hour(s))   CBC WITH AUTOMATED DIFF    Collection Time: 07/13/22  1:00 PM   Result Value Ref Range    WBC 9.1 3.6 - 11.0 K/uL    RBC 4.28 3.80 - 5.20 M/uL    HGB 11.6 11.5 - 16.0 g/dL    HCT 35.9 35.0 - 47.0 %    MCV 83.9 80.0 - 99.0 FL    MCH 27.1 26.0 - 34.0 PG    MCHC 32.3 30.0 - 36.5 g/dL    RDW 13.1 11.5 - 14.5 %    PLATELET 608 207 - 204 K/uL    MPV 10.7 8.9 - 12.9 FL    NEUTROPHILS 55 32 - 75 %    LYMPHOCYTES 32 12 - 49 %    MONOCYTES 9 5 - 13 %    EOSINOPHILS 3 0 - 7 %    BASOPHILS 1 0 - 1 %    IMMATURE GRANULOCYTES 0 0.0 - 0.5 %    ABS. NEUTROPHILS 5.0 1.8 - 8.0 K/UL    ABS. LYMPHOCYTES 2.9 0.8 - 3.5 K/UL    ABS. MONOCYTES 0.8 0.0 - 1.0 K/UL    ABS. EOSINOPHILS 0.3 0.0 - 0.4 K/UL    ABS. BASOPHILS 0.1 0.0 - 0.1 K/UL    ABS. IMM. GRANS. 0.0 0.00 - 0.04 K/UL    DF AUTOMATED     METABOLIC PANEL, COMPREHENSIVE    Collection Time: 07/13/22  1:00 PM   Result Value Ref Range    Sodium 139 136 - 145 mmol/L    Potassium 3.6 3.5 - 5.1 mmol/L    Chloride 99 97 - 108 mmol/L    CO2 31 21 - 32 mmol/L    Anion gap 9 5 - 15 mmol/L    Glucose 142 (H) 65 - 100 mg/dL    BUN 18 6 - 20 mg/dL    Creatinine 0.83 0.55 - 1.02 mg/dL    BUN/Creatinine ratio 22 (H) 12 - 20      GFR est AA >60 >60 ml/min/1.73m2    GFR est non-AA >60 >60 ml/min/1.73m2    Calcium 10.1 8.5 - 10.1 mg/dL    Bilirubin, total 0.5 0.2 - 1.0 mg/dL    AST (SGOT) 27 15 - 37 U/L    ALT (SGPT) 35 12 - 78 U/L    Alk.  phosphatase 128 (H) 45 - 117 U/L    Protein, total 8.0 6.4 - 8.2 g/dL    Albumin 3.9 3.5 - 5.0 g/dL Globulin 4.1 (H) 2.0 - 4.0 g/dL    A-G Ratio 1.0 (L) 1.1 - 2.2     TROPONIN-HIGH SENSITIVITY    Collection Time: 07/13/22  1:00 PM   Result Value Ref Range    Troponin-High Sensitivity 7 0 - 51 ng/L   EKG, 12 LEAD, INITIAL    Collection Time: 07/13/22  1:00 PM   Result Value Ref Range    Ventricular Rate 82 BPM    Atrial Rate 82 BPM    P-R Interval 136 ms    QRS Duration 82 ms    Q-T Interval 382 ms    QTC Calculation (Bezet) 446 ms    Calculated R Axis -3 degrees    Calculated T Axis 49 degrees    Diagnosis       Normal sinus rhythm  Normal ECG  When compared with ECG of 05-DEC-2021 18:30,  No significant change was found  Confirmed by Arminda Vasquez MD, HAI ([id number]) on 7/14/2022 8:01:48 AM     TROPONIN-HIGH SENSITIVITY    Collection Time: 07/13/22  2:40 PM   Result Value Ref Range    Troponin-High Sensitivity 8 0 - 51 ng/L   METABOLIC PANEL, BASIC    Collection Time: 07/14/22  6:38 AM   Result Value Ref Range    Sodium 137 136 - 145 mmol/L    Potassium 3.8 3.5 - 5.1 mmol/L    Chloride 99 97 - 108 mmol/L    CO2 32 21 - 32 mmol/L    Anion gap 6 5 - 15 mmol/L    Glucose 135 (H) 65 - 100 mg/dL    BUN 17 6 - 20 mg/dL    Creatinine 0.70 0.55 - 1.02 mg/dL    BUN/Creatinine ratio 24 (H) 12 - 20      GFR est AA >60 >60 ml/min/1.73m2    GFR est non-AA >60 >60 ml/min/1.73m2    Calcium 9.8 8.5 - 10.1 mg/dL   CBC W/O DIFF    Collection Time: 07/14/22  6:38 AM   Result Value Ref Range    WBC 7.5 3.6 - 11.0 K/uL    RBC 4.36 3.80 - 5.20 M/uL    HGB 11.5 11.5 - 16.0 g/dL    HCT 36.8 35.0 - 47.0 %    MCV 84.4 80.0 - 99.0 FL    MCH 26.4 26.0 - 34.0 PG    MCHC 31.3 30.0 - 36.5 g/dL    RDW 13.2 11.5 - 14.5 %    PLATELET 170 427 - 643 K/uL    MPV 11.4 8.9 - 12.9 FL    NRBC 0.0 0.0  WBC    ABSOLUTE NRBC 0.00 0.00 - 0.01 K/uL        Lisette Lal MD

## 2022-07-15 ENCOUNTER — APPOINTMENT (OUTPATIENT)
Dept: ENDOSCOPY | Age: 72
End: 2022-07-15
Attending: INTERNAL MEDICINE
Payer: MEDICARE

## 2022-07-15 ENCOUNTER — ANESTHESIA (OUTPATIENT)
Dept: ENDOSCOPY | Age: 72
End: 2022-07-15
Payer: MEDICARE

## 2022-07-15 ENCOUNTER — ANESTHESIA EVENT (OUTPATIENT)
Dept: ENDOSCOPY | Age: 72
End: 2022-07-15
Payer: MEDICARE

## 2022-07-15 VITALS
SYSTOLIC BLOOD PRESSURE: 115 MMHG | BODY MASS INDEX: 34.04 KG/M2 | OXYGEN SATURATION: 93 % | TEMPERATURE: 97.7 F | WEIGHT: 185 LBS | DIASTOLIC BLOOD PRESSURE: 75 MMHG | HEIGHT: 62 IN | HEART RATE: 75 BPM | RESPIRATION RATE: 18 BRPM

## 2022-07-15 LAB
ATRIAL RATE: 76 BPM
CALCULATED R AXIS, ECG10: 2 DEGREES
CALCULATED T AXIS, ECG11: 71 DEGREES
DIAGNOSIS, 93000: NORMAL
P-R INTERVAL, ECG05: 144 MS
Q-T INTERVAL, ECG07: 418 MS
QRS DURATION, ECG06: 80 MS
QTC CALCULATION (BEZET), ECG08: 470 MS
VENTRICULAR RATE, ECG03: 76 BPM

## 2022-07-15 PROCEDURE — 74011250637 HC RX REV CODE- 250/637: Performed by: INTERNAL MEDICINE

## 2022-07-15 PROCEDURE — 94640 AIRWAY INHALATION TREATMENT: CPT

## 2022-07-15 PROCEDURE — 74011250636 HC RX REV CODE- 250/636: Performed by: REGISTERED NURSE

## 2022-07-15 PROCEDURE — 74011000250 HC RX REV CODE- 250: Performed by: INTERNAL MEDICINE

## 2022-07-15 PROCEDURE — 94761 N-INVAS EAR/PLS OXIMETRY MLT: CPT

## 2022-07-15 PROCEDURE — 74011250637 HC RX REV CODE- 250/637: Performed by: FAMILY MEDICINE

## 2022-07-15 PROCEDURE — 2709999900 HC NON-CHARGEABLE SUPPLY: Performed by: INTERNAL MEDICINE

## 2022-07-15 PROCEDURE — 77030009426 HC FCPS BIOP ENDOSC BSC -B: Performed by: INTERNAL MEDICINE

## 2022-07-15 PROCEDURE — 76060000033 HC ANESTHESIA 1 TO 1.5 HR: Performed by: INTERNAL MEDICINE

## 2022-07-15 PROCEDURE — 74011000250 HC RX REV CODE- 250: Performed by: REGISTERED NURSE

## 2022-07-15 PROCEDURE — 77030021593 HC FCPS BIOP ENDOSC BSC -A: Performed by: INTERNAL MEDICINE

## 2022-07-15 PROCEDURE — 76040000008: Performed by: INTERNAL MEDICINE

## 2022-07-15 PROCEDURE — G0378 HOSPITAL OBSERVATION PER HR: HCPCS

## 2022-07-15 PROCEDURE — 88305 TISSUE EXAM BY PATHOLOGIST: CPT

## 2022-07-15 RX ORDER — PROPOFOL 10 MG/ML
INJECTION, EMULSION INTRAVENOUS AS NEEDED
Status: DISCONTINUED | OUTPATIENT
Start: 2022-07-15 | End: 2022-07-15 | Stop reason: HOSPADM

## 2022-07-15 RX ORDER — SODIUM CHLORIDE, SODIUM LACTATE, POTASSIUM CHLORIDE, CALCIUM CHLORIDE 600; 310; 30; 20 MG/100ML; MG/100ML; MG/100ML; MG/100ML
INJECTION, SOLUTION INTRAVENOUS
Status: DISCONTINUED | OUTPATIENT
Start: 2022-07-15 | End: 2022-07-15 | Stop reason: HOSPADM

## 2022-07-15 RX ORDER — OMEPRAZOLE 40 MG/1
40 CAPSULE, DELAYED RELEASE ORAL DAILY
Qty: 30 CAPSULE | Refills: 0 | Status: SHIPPED | OUTPATIENT
Start: 2022-07-15 | End: 2022-08-29

## 2022-07-15 RX ORDER — LIDOCAINE HYDROCHLORIDE 20 MG/ML
INJECTION, SOLUTION EPIDURAL; INFILTRATION; INTRACAUDAL; PERINEURAL AS NEEDED
Status: DISCONTINUED | OUTPATIENT
Start: 2022-07-15 | End: 2022-07-15 | Stop reason: HOSPADM

## 2022-07-15 RX ADMIN — LEVOTHYROXINE SODIUM 50 MCG: 0.03 TABLET ORAL at 05:33

## 2022-07-15 RX ADMIN — ATORVASTATIN CALCIUM 40 MG: 40 TABLET, FILM COATED ORAL at 08:33

## 2022-07-15 RX ADMIN — LIDOCAINE HYDROCHLORIDE 100 MG: 20 INJECTION, SOLUTION EPIDURAL; INFILTRATION; INTRACAUDAL; PERINEURAL at 14:05

## 2022-07-15 RX ADMIN — ASPIRIN 81 MG: 81 TABLET, COATED ORAL at 08:33

## 2022-07-15 RX ADMIN — GABAPENTIN 300 MG: 300 CAPSULE ORAL at 08:34

## 2022-07-15 RX ADMIN — GABAPENTIN 300 MG: 300 CAPSULE ORAL at 15:24

## 2022-07-15 RX ADMIN — PROPOFOL 200 MG: 10 INJECTION, EMULSION INTRAVENOUS at 14:05

## 2022-07-15 RX ADMIN — AMLODIPINE BESYLATE 10 MG: 5 TABLET ORAL at 08:33

## 2022-07-15 RX ADMIN — TIOTROPIUM BROMIDE INHALATION SPRAY 2 PUFF: 3.12 SPRAY, METERED RESPIRATORY (INHALATION) at 09:00

## 2022-07-15 RX ADMIN — LOSARTAN POTASSIUM 100 MG: 50 TABLET, FILM COATED ORAL at 08:33

## 2022-07-15 RX ADMIN — SODIUM CHLORIDE, PRESERVATIVE FREE 10 ML: 5 INJECTION INTRAVENOUS at 05:36

## 2022-07-15 RX ADMIN — SODIUM CHLORIDE, POTASSIUM CHLORIDE, SODIUM LACTATE AND CALCIUM CHLORIDE: 600; 310; 30; 20 INJECTION, SOLUTION INTRAVENOUS at 14:05

## 2022-07-15 RX ADMIN — ROPINIROLE HYDROCHLORIDE 4 MG: 1 TABLET, FILM COATED ORAL at 09:57

## 2022-07-15 RX ADMIN — HYDROCHLOROTHIAZIDE 25 MG: 25 TABLET ORAL at 08:34

## 2022-07-15 RX ADMIN — BUDESONIDE AND FORMOTEROL FUMARATE DIHYDRATE 2 PUFF: 160; 4.5 AEROSOL RESPIRATORY (INHALATION) at 09:00

## 2022-07-15 NOTE — PROGRESS NOTES
Progress Note      7/15/2022 12:55 PM  NAME: Julianna Novoa   MRN:  [id number]   Admit Diagnosis: Chest pain [R07.9]          Assessment/Plan:   Atypical chest pain, pleuritic, resolved, pain-free, has 2 sets of negative cardiac enzymes. Dysphagia, undergoing GI evaluation, agree to proceed with endoscopy/GA    Hypertension, well controlled    COPD,    Type 2 diabetes         []       High complexity decision making was performed in this patient at high risk for decompensation with multiple organ involvement. Subjective:     Julianna Novoa denies chest pain, dyspnea. Discussed with RN events overnight. Review of Systems:    Symptom Y/N Comments  Symptom Y/N Comments   Fever/Chills N   Chest Pain N    Poor Appetite N   Edema N    Cough N   Abdominal Pain N    Sputum N   Joint Pain N    SOB/HOLLIDAY N   Pruritis/Rash N    Nausea/vomit N   Tolerating PT/OT Y    Diarrhea N   Tolerating Diet Y    Constipation N   Other       Could NOT obtain due to:      Objective:      Physical Exam:    Last 24hrs VS reviewed since prior progress note. Most recent are:    Visit Vitals  /62 (BP Patient Position: At rest;Lying)   Pulse 77   Temp 98.5 °F (36.9 °C)   Resp 18   Ht 5' 2\" (1.575 m)   Wt 83.9 kg (185 lb)   SpO2 94%   BMI 33.84 kg/m²     No intake or output data in the 24 hours ending 07/15/22 1255     General Appearance: Well developed, well nourished, alert; no acute distress. Ears/Nose/Mouth/Throat: Hearing grossly normal; moist mucous membranes  Neck: Supple. Chest: Lungs clear to auscultation bilaterally. Cardiovascular: Regular rate and rhythm, S1S2 normal, no murmur. Abdomen: Soft, non-tender, bowel sounds are active. Extremities: No edema bilaterally. Skin: Warm and dry. []         Post-cath site without hematoma, bruit, tenderness, or thrill. Distal pulses intact.     PMH/SH reviewed - no change compared to H&P    Data Review    Telemetry:     EKG:   []  No new EKG for review    Lab Data Personally Reviewed:    Recent Labs     07/14/22  0638 07/13/22  1300   WBC 7.5 9.1   HGB 11.5 11.6   HCT 36.8 35.9    312     No results for input(s): INR, PTP, APTT, INREXT in the last 72 hours. Recent Labs     07/14/22  0638 07/13/22  1300    139   K 3.8 3.6   CL 99 99   CO2 32 31   BUN 17 18   CREA 0.70 0.83   * 142*   CA 9.8 10.1     No results for input(s): CPK, CKNDX, TROIQ in the last 72 hours. No lab exists for component: CPKMB  Lab Results   Component Value Date/Time    Cholesterol, total 202 (H) 03/31/2022 04:10 PM    HDL Cholesterol 41 03/31/2022 04:10 PM    LDL, calculated 112 (H) 03/31/2022 04:10 PM    LDL, calculated 76 06/17/2020 11:01 AM    Triglyceride 286 (H) 03/31/2022 04:10 PM       Recent Labs     07/13/22  1300   *   TP 8.0   ALB 3.9   GLOB 4.1*     No results for input(s): PH, PCO2, PO2 in the last 72 hours.     Medications Personally Reviewed:    Current Facility-Administered Medications   Medication Dose Route Frequency    sodium chloride (NS) flush 5-40 mL  5-40 mL IntraVENous Q8H    sodium chloride (NS) flush 5-40 mL  5-40 mL IntraVENous PRN    acetaminophen (TYLENOL) tablet 650 mg  650 mg Oral Q6H PRN    Or    acetaminophen (TYLENOL) suppository 650 mg  650 mg Rectal Q6H PRN    polyethylene glycol (MIRALAX) packet 17 g  17 g Oral DAILY PRN    ondansetron (ZOFRAN ODT) tablet 4 mg  4 mg Oral Q8H PRN    Or    ondansetron (ZOFRAN) injection 4 mg  4 mg IntraVENous Q6H PRN    [Held by provider] enoxaparin (LOVENOX) injection 40 mg  40 mg SubCUTAneous DAILY    nitroglycerin (NITROSTAT) tablet 0.4 mg  0.4 mg SubLINGual PRN    labetaloL (NORMODYNE;TRANDATE) 20 mg/4 mL (5 mg/mL) injection 20 mg  20 mg IntraVENous Q4H PRN    albuterol (PROVENTIL HFA, VENTOLIN HFA, PROAIR HFA) inhaler 1 Puff  1 Puff Inhalation Q4H PRN    losartan (COZAAR) tablet 100 mg  100 mg Oral DAILY    amLODIPine (NORVASC) tablet 10 mg  10 mg Oral DAILY    aspirin delayed-release tablet 81 mg  81 mg Oral DAILY    atorvastatin (LIPITOR) tablet 40 mg  40 mg Oral DAILY    gabapentin (NEURONTIN) capsule 300 mg  300 mg Oral TID    levothyroxine (SYNTHROID) tablet 50 mcg  50 mcg Oral 6am    LORazepam (ATIVAN) tablet 0.5 mg  0.5 mg Oral Q12H PRN    rOPINIRole (REQUIP) tablet 4 mg  4 mg Oral BID    tiotropium bromide (SPIRIVA RESPIMAT) 2.5 mcg /actuation  2 Puff Inhalation DAILY    hydroCHLOROthiazide (HYDRODIURIL) tablet 25 mg  25 mg Oral DAILY    budesonide-formoteroL (SYMBICORT) 160-4.5 mcg/actuation HFA inhaler 2 Puff  2 Puff Inhalation BID RT         Mitchel Moralez MD

## 2022-07-15 NOTE — ANESTHESIA PREPROCEDURE EVALUATION
Relevant Problems   No relevant active problems       Anesthetic History   No history of anesthetic complications            Review of Systems / Medical History  Patient summary reviewed, nursing notes reviewed and pertinent labs reviewed    Pulmonary    COPD      Shortness of breath         Neuro/Psych         Psychiatric history     Cardiovascular    Hypertension          Hyperlipidemia      Comments: Echo:    Left Ventricle: Normal left ventricular systolic function with a visually estimated EF of 65 - 70%. Left ventricle size is normal. Normal wall thickness. Normal wall motion. Grade I diastolic dysfunction with normal LAP.   Mitral Valve: Mild annular calcification at the posterior leaflet of the mitral valve. GI/Hepatic/Renal  Within defined limits              Endo/Other    Diabetes    Obesity     Other Findings            Past Medical History:   Diagnosis Date    Bacterial skin infection of trunk 9/2/2020    Chronic obstructive pulmonary disease ([facility] [address] 3/31/2022    Colon polyp     DM (diabetes mellitus) ([facility] [address] 5/24/2011    Environmental allergies     HTN (hypertension) 5/24/2011    Hyperlipidemia 5/24/2011    Panic attacks     RLS (restless legs syndrome) 5/24/2011       Past Surgical History:   Procedure Laterality Date    ENDOSCOPY, COLON, DIAGNOSTIC  3/20/12    OK. Rep 10 yrs.  Zak Hanson)    HX APPENDECTOMY      HX CHOLECYSTECTOMY         Current Outpatient Medications   Medication Instructions    albuterol (PROVENTIL HFA, VENTOLIN HFA, PROAIR HFA) 90 mcg/actuation inhaler 2 Puffs, Inhalation, EVERY 4 HOURS AS NEEDED    amLODIPine (NORVASC) 10 mg tablet TAKE 1 TABLET BY MOUTH DAILY    aspirin delayed-release 81 mg, Oral, DAILY    atorvastatin (LIPITOR) 10 mg tablet TAKE 1 TABLET BY MOUTH DAILY AT BEDTIME    cyanocobalamin (vitamin B-12) 2,000 mcg, Oral, DAILY    fluticasone-umeclidinium-vilanterol (Trelegy Ellipta) 100-62.5-25 mcg inhaler 1 Puff, Inhalation, DAILY    gabapentin (NEURONTIN) 300 mg, Oral, 3 TIMES DAILY    levothyroxine (SYNTHROID) 50 mcg, Oral, 6AM    LORazepam (ATIVAN) 0.5 mg tablet TAKE 1-2 TABS BY MOUTH TWICE A DAY AS NEEDED    losartan-hydroCHLOROthiazide (Hyzaar) 100-25 mg per tablet 1 Tablet, Oral, DAILY    meloxicam (MOBIC) 7.5 mg tablet TAKE 1 TABLET BY MOUTH DAILY    metFORMIN (GLUCOPHAGE) 1,000 mg, Oral, 2 TIMES DAILY    omeprazole (PRILOSEC) 40 mg, Oral, DAILY    rOPINIRole (REQUIP) 4 mg tab TAB TAKE 1 TABLET BY MOUTH TWO TIMES A DAY       Current Facility-Administered Medications   Medication Dose Route Frequency    sodium chloride (NS) flush 5-40 mL  5-40 mL IntraVENous Q8H    sodium chloride (NS) flush 5-40 mL  5-40 mL IntraVENous PRN    acetaminophen (TYLENOL) tablet 650 mg  650 mg Oral Q6H PRN    Or    acetaminophen (TYLENOL) suppository 650 mg  650 mg Rectal Q6H PRN    polyethylene glycol (MIRALAX) packet 17 g  17 g Oral DAILY PRN    ondansetron (ZOFRAN ODT) tablet 4 mg  4 mg Oral Q8H PRN    Or    ondansetron (ZOFRAN) injection 4 mg  4 mg IntraVENous Q6H PRN    [Held by provider] enoxaparin (LOVENOX) injection 40 mg  40 mg SubCUTAneous DAILY    nitroglycerin (NITROSTAT) tablet 0.4 mg  0.4 mg SubLINGual PRN    labetaloL (NORMODYNE;TRANDATE) 20 mg/4 mL (5 mg/mL) injection 20 mg  20 mg IntraVENous Q4H PRN    albuterol (PROVENTIL HFA, VENTOLIN HFA, PROAIR HFA) inhaler 1 Puff  1 Puff Inhalation Q4H PRN    losartan (COZAAR) tablet 100 mg  100 mg Oral DAILY    amLODIPine (NORVASC) tablet 10 mg  10 mg Oral DAILY    aspirin delayed-release tablet 81 mg  81 mg Oral DAILY    atorvastatin (LIPITOR) tablet 40 mg  40 mg Oral DAILY    gabapentin (NEURONTIN) capsule 300 mg  300 mg Oral TID    levothyroxine (SYNTHROID) tablet 50 mcg  50 mcg Oral 6am    LORazepam (ATIVAN) tablet 0.5 mg  0.5 mg Oral Q12H PRN    rOPINIRole (REQUIP) tablet 4 mg  4 mg Oral BID    tiotropium bromide (SPIRIVA RESPIMAT) 2.5 mcg /actuation  2 Puff Inhalation DAILY    hydroCHLOROthiazide (HYDRODIURIL) tablet 25 mg  25 mg Oral DAILY    budesonide-formoteroL (SYMBICORT) 160-4.5 mcg/actuation HFA inhaler 2 Puff  2 Puff Inhalation BID RT       Patient Vitals for the past 24 hrs:   Temp Pulse Resp BP SpO2   07/15/22 1121 36.9 °C (98.5 °F) 77 18 107/62 94 %   07/15/22 0900 -- -- -- -- 92 %   07/15/22 0730 36.6 °C (97.8 °F) 79 16 118/77 93 %   07/15/22 0437 36.4 °C (97.5 °F) 64 16 117/69 92 %   07/15/22 0116 36.6 °C (97.9 °F) 66 15 96/60 92 %   07/15/22 0000 -- 77 -- -- --   07/14/22 2018 36.9 °C (98.4 °F) 84 15 101/65 90 %   07/14/22 2000 -- 88 -- -- --   07/14/22 1545 -- 89 -- -- --   07/14/22 1520 36.8 °C (98.3 °F) 86 15 119/79 92 %   07/14/22 1329 -- 81 15 136/73 92 %       Lab Results   Component Value Date/Time    WBC 7.5 07/14/2022 06:38 AM    HGB (POC) 13.4 02/17/2020 10:33 AM    HGB 11.5 07/14/2022 06:38 AM    HCT (POC) 42.7 02/17/2020 10:33 AM    HCT 36.8 07/14/2022 06:38 AM    PLATELET 429 45/70/3498 06:38 AM    MCV 84.4 07/14/2022 06:38 AM     Lab Results   Component Value Date/Time    Sodium 137 07/14/2022 06:38 AM    Potassium 3.8 07/14/2022 06:38 AM    Chloride 99 07/14/2022 06:38 AM    CO2 32 07/14/2022 06:38 AM    Anion gap 6 07/14/2022 06:38 AM    Glucose 135 (H) 07/14/2022 06:38 AM    BUN 17 07/14/2022 06:38 AM    Creatinine 0.70 07/14/2022 06:38 AM    BUN/Creatinine ratio 24 (H) 07/14/2022 06:38 AM    GFR est AA >60 07/14/2022 06:38 AM    GFR est non-AA >60 07/14/2022 06:38 AM    Calcium 9.8 07/14/2022 06:38 AM     No results found for: APTT, PTP, INR, INREXT  Lab Results   Component Value Date/Time    Glucose 135 (H) 07/14/2022 06:38 AM     Physical Exam    Airway  Mallampati: I  TM Distance: 4 - 6 cm  Neck ROM: normal range of motion   Mouth opening: Normal     Cardiovascular    Rhythm: regular  Rate: normal         Dental  No notable dental hx       Pulmonary  Breath sounds clear to auscultation               Abdominal  GI exam deferred       Other Findings            Anesthetic Plan    ASA: 3  Anesthesia type: total IV anesthesia and MAC          Induction: Intravenous  Anesthetic plan and risks discussed with: Patient and Family

## 2022-07-15 NOTE — DISCHARGE INSTRUCTIONS
Patient Education        Chest Pain: Care Instructions  Your Care Instructions     There are many things that can cause chest pain. Some are not serious and will get better on their own in a few days. But some kinds of chest pain need more testing and treatment. Your doctor may have recommended a follow-up visit in the next 8 to 12 hours. If you are not getting better, you may need more tests or treatment. Even though your doctor has released you, you still need to watch for any problems. The doctor carefully checked you, but sometimes problems can develop later. If you have new symptoms or if your symptoms do not get better, get medical care right away. If you have worse or different chest pain or pressure that lasts more than 5 minutes or you passed out (lost consciousness), call 911 or seek other emergency help right away. A medical visit is only one step in your treatment. Even if you feel better, you still need to do what your doctor recommends, such as going to all suggested follow-up appointments and taking medicines exactly as directed. This will help you recover and help prevent future problems. How can you care for yourself at home? · Rest until you feel better. · Take your medicine exactly as prescribed. Call your doctor if you think you are having a problem with your medicine. · Do not drive after taking a prescription pain medicine. When should you call for help? Call 911 if:     · You passed out (lost consciousness).     · You have severe difficulty breathing.     · You have symptoms of a heart attack. These may include:  ? Chest pain or pressure, or a strange feeling in your chest.  ? Sweating. ? Shortness of breath. ? Nausea or vomiting. ? Pain, pressure, or a strange feeling in your back, neck, jaw, or upper belly or in one or both shoulders or arms. ? Lightheadedness or sudden weakness. ? A fast or irregular heartbeat.   After you call 911, the  may tell you to chew 1 adult-strength or 2 to 4 low-dose aspirin. Wait for an ambulance. Do not try to drive yourself. Call your doctor today if:     · You have any trouble breathing.     · Your chest pain gets worse.     · You are dizzy or lightheaded, or you feel like you may faint.     · You are not getting better as expected.     · You are having new or different chest pain. Where can you learn more? Go to http://www.vital.com/  Enter A120 in the search box to learn more about \"Chest Pain: Care Instructions. \"  Current as of: July 1, 2021               Content Version: 13.2  © 2006-2022 [employer]. Care instructions adapted under license by [employer] (which disclaims liability or warranty for this information). If you have questions about a medical condition or this instruction, always ask your healthcare professional. [place] [address] any warranty or liability for your use of this information. Patient Education        Gastritis: Care Instructions  Your Care Instructions     Gastritis is a sore and upset stomach. It happens when something irritates the stomach lining. Many things can cause it. These include an infection such as the flu or something you ate or drank. Medicines or a sore on the lining of the stomach (ulcer) also can cause it. Your belly may bloat and ache. You may belch, vomit, and feel sick to your stomach. You should be able to relieve the problem by taking medicine. And it may help to change your diet. If gastritis lasts, your doctor may prescribe medicine. Follow-up care is a key part of your treatment and safety. Be sure to make and go to all appointments, and call your doctor if you are having problems. It's also a good idea to know your test results and keep a list of the medicines you take. How can you care for yourself at home? · If your doctor prescribed antibiotics, take them as directed.  Do not stop taking them just because you feel better. You need to take the full course of antibiotics. · Be safe with medicines. If your doctor prescribed medicine to decrease stomach acid, take it as directed. Call your doctor if you think you are having a problem with your medicine. · Do not take any other medicine, including over-the-counter pain relievers, without talking to your doctor first.  · If your doctor recommends over-the-counter medicine to reduce stomach acid, such as Pepcid AC (famotidine), Prilosec (omeprazole), or Tagamet HB (cimetidine) follow the directions on the label. · Drink plenty of fluids to prevent dehydration. Choose water and other clear liquids. If you have kidney, heart, or liver disease and have to limit fluids, talk with your doctor before you increase the amount of fluids you drink. · Avoid foods that make your symptoms worse. These may include chocolate, mint, alcohol, pepper, spicy foods, high-fat foods, or drinks with caffeine in them, such as tea, coffee, bushra, or energy drinks. If your symptoms are worse after you eat a certain food, you may want to stop eating it to see if your symptoms get better. When should you call for help? Call 911 anytime you think you may need emergency care. For example, call if:    · You vomit blood or what looks like coffee grounds.     · You pass maroon or very bloody stools. Call your doctor now or seek immediate medical care if:    · You start breathing fast and have not produced urine in the last 8 hours.     · You cannot keep fluids down. Watch closely for changes in your health, and be sure to contact your doctor if:    · You do not get better as expected. Where can you learn more? Go to http://www.Njini.com/  Enter Z536 in the search box to learn more about \"Gastritis: Care Instructions. \"  Current as of: September 8, 2021               Content Version: 13.2  © 0752-7616 [employer], [employer].    Care instructions adapted under license by [employer] (which disclaims liability or warranty for this information). If you have questions about a medical condition or this instruction, always ask your healthcare professional. [address] any warranty or liability for your use of this information.

## 2022-07-15 NOTE — PERIOP NOTES
TRANSFER - OUT REPORT:    Verbal report given to ELIZABETH Marie(name) on Estrada Reason  being transferred to 483(unit) for routine post - op       Report consisted of patients Situation, Background, Assessment and   Recommendations(SBAR). Information from the following report(s) SBAR, Procedure Summary, Intake/Output and MAR was reviewed with the receiving nurse. Opportunity for questions and clarification was provided.       Patient transported with:   Monitor  Registered Nurse

## 2022-07-15 NOTE — CONSULTS
Consult    Patient: Lakisha Richards MRN: [id number]  SSN: [id number]    YOB: 1950  Age: 73 y.o. Sex: female      Subjective:      Lakisha Richards is a 73 y.o. female who is being seen for dysphagia chest pain    She presented to the ED with chief complaint of chest pain. sharp in nature, rated 10/10,  Patient also rports food stucking in the chest, sometimes, which cause more pain, but most time that spontaneously passes or regurgitate out. In ED, she had an episode of pressure like mid-sternal chest pain, relieved with SL nitro. Also associated with nausea without vomiting,    .  EKG without significant change,. Troponin negative x3, has been eval by cardiology, no cardiac ischemia, no further plan for intervention, GI evaluation placed,   Past Medical History:   Diagnosis Date    Bacterial skin infection of trunk 9/2/2020    Chronic obstructive pulmonary disease ([facility] [address] 3/31/2022    Colon polyp     DM (diabetes mellitus) ([facility] [address] 5/24/2011    Environmental allergies     HTN (hypertension) 5/24/2011    Hyperlipidemia 5/24/2011    Panic attacks     RLS (restless legs syndrome) 5/24/2011     Past Surgical History:   Procedure Laterality Date    ENDOSCOPY, COLON, DIAGNOSTIC  3/20/12    OK. Rep 10 yrs.  Trista Gab)    HX APPENDECTOMY      HX CHOLECYSTECTOMY        Family History   Problem Relation Age of Onset    Thyroid Disease Mother      Social History     Tobacco Use    Smoking status: Never Smoker    Smokeless tobacco: Never Used    Tobacco comment: has second hand smoking exposure for long time over the years   Substance Use Topics    Alcohol use: Yes     Comment: occ      Current Facility-Administered Medications   Medication Dose Route Frequency Provider Last Rate Last Admin    sodium chloride (NS) flush 5-40 mL  5-40 mL IntraVENous Q8H Miguel Ángel Rivas MD   10 mL at 07/14/22 2133    sodium chloride (NS) flush 5-40 mL  5-40 mL IntraVENous PRN Kyle Kate MD  acetaminophen (TYLENOL) tablet 650 mg  650 mg Oral Q6H PRN Gwen Rivas MD        Or    acetaminophen (TYLENOL) suppository 650 mg  650 mg Rectal Q6H PRN Gwen Rivas MD        polyethylene glycol (MIRALAX) packet 17 g  17 g Oral DAILY PRN Gwen Rivas MD        ondansetron (ZOFRAN ODT) tablet 4 mg  4 mg Oral Q8H PRN Gwen Rivas MD        Or    ondansetron (ZOFRAN) injection 4 mg  4 mg IntraVENous Q6H PRN Gwen Rivas MD        enoxaparin (LOVENOX) injection 40 mg  40 mg SubCUTAneous DAILY Gwen Rivas MD   40 mg at 07/14/22 0958    nitroglycerin (NITROSTAT) tablet 0.4 mg  0.4 mg SubLINGual PRN Gwen Rivas MD        labetaloL (NORMODYNE;TRANDATE) 20 mg/4 mL (5 mg/mL) injection 20 mg  20 mg IntraVENous Q4H PRN Gwen Rivas MD        albuterol (PROVENTIL HFA, VENTOLIN HFA, PROAIR HFA) inhaler 1 Puff  1 Puff Inhalation Q4H PRN Gwen Rivas MD        losartan (COZAAR) tablet 100 mg  100 mg Oral DAILY Gwen Rivas MD        amLODIPine (NORVASC) tablet 10 mg  10 mg Oral DAILY Gwen Rivas MD        aspirin delayed-release tablet 81 mg  81 mg Oral DAILY Gwen Rivas MD        atorvastatin (LIPITOR) tablet 40 mg  40 mg Oral DAILY Gwen Rivas MD        gabapentin (NEURONTIN) capsule 300 mg  300 mg Oral TID Walter Boateng MD   300 mg at 07/14/22 2132    levothyroxine (SYNTHROID) tablet 50 mcg  50 mcg Oral 6am Walter Boateng MD   50 mcg at 07/14/22 0807    LORazepam (ATIVAN) tablet 0.5 mg  0.5 mg Oral Q12H PRN Walter Boateng MD        rOPINIRole (REQUIP) tablet 4 mg  4 mg Oral BID Walter Boateng MD   4 mg at 07/14/22 2132    tiotropium bromide (SPIRIVA RESPIMAT) 2.5 mcg /actuation  2 Puff Inhalation DAILY Walter Boateng MD   2 Puff at 07/14/22 3984    hydroCHLOROthiazide (HYDRODIURIL) tablet 25 mg  25 mg Oral DAILY Glen Lugo MD        budesonide-formoteroL (SYMBICORT) 160-4.5 mcg/actuation HFA inhaler 2 Puff  2 Puff Inhalation BID RT Luis Gonzalez MD   2 Puff at 07/14/22 2048        No Known Allergies    Review of Systems:  Review of Systems   Constitutional: Negative. Eyes: Negative. Respiratory: Positive for cough. Cardiovascular: Positive for chest pain. Gastrointestinal: Positive for heartburn and nausea. Genitourinary: Positive for urgency. Musculoskeletal: Positive for back pain. Skin: Negative. Neurological: Positive for dizziness. Psychiatric/Behavioral: Positive for depression. Objective:     Vitals:    07/14/22 1520 07/14/22 1545 07/14/22 2000 07/14/22 2018   BP: 119/79   101/65   Pulse: 86 89 88 84   Resp: 15   15   Temp: 98.3 °F (36.8 °C)   98.4 °F (36.9 °C)   SpO2: 92%   90%   Weight:       Height:            Physical Exam:  Physical Exam  Constitutional:       Appearance: She is obese. HENT:      Head: Atraumatic. Mouth/Throat:      Mouth: Mucous membranes are dry. Eyes:      General: No scleral icterus. Cardiovascular:      Rate and Rhythm: Normal rate. Pulses: Normal pulses. Pulmonary:      Effort: Pulmonary effort is normal.   Abdominal:      General: Bowel sounds are normal.      Palpations: Abdomen is soft. Tenderness: There is no rebound. Hernia: No hernia is present. Musculoskeletal:         General: No tenderness. Normal range of motion. Cervical back: Neck supple. Left lower leg: No edema. Skin:     General: Skin is dry. Findings: No rash. Neurological:      General: No focal deficit present.    Psychiatric:         Mood and Affect: Mood normal.          Recent Results (from the past 24 hour(s))   METABOLIC PANEL, BASIC    Collection Time: 07/14/22  6:38 AM   Result Value Ref Range    Sodium 137 136 - 145 mmol/L    Potassium 3.8 3.5 - 5.1 mmol/L    Chloride 99 97 - 108 mmol/L    CO2 32 21 - 32 mmol/L    Anion gap 6 5 - 15 mmol/L    Glucose 135 (H) 65 - 100 mg/dL    BUN 17 6 - 20 mg/dL    Creatinine 0.70 0.55 - 1.02 mg/dL    BUN/Creatinine ratio 24 (H) 12 - 20      GFR est AA >60 >60 ml/min/1.73m2    GFR est non-AA >60 >60 ml/min/1.73m2    Calcium 9.8 8.5 - 10.1 mg/dL   CBC W/O DIFF    Collection Time: 07/14/22  6:38 AM   Result Value Ref Range    WBC 7.5 3.6 - 11.0 K/uL    RBC 4.36 3.80 - 5.20 M/uL    HGB 11.5 11.5 - 16.0 g/dL    HCT 36.8 35.0 - 47.0 %    MCV 84.4 80.0 - 99.0 FL    MCH 26.4 26.0 - 34.0 PG    MCHC 31.3 30.0 - 36.5 g/dL    RDW 13.2 11.5 - 14.5 %    PLATELET 133 811 - 909 K/uL    MPV 11.4 8.9 - 12.9 FL    NRBC 0.0 0.0  WBC    ABSOLUTE NRBC 0.00 0.00 - 0.01 K/uL   ECHO ADULT COMPLETE    Collection Time: 07/14/22 10:00 AM   Result Value Ref Range    LV EDV A2C 11 mL    LV EDV A4C 31 mL    LV ESV A2C 4 mL    LV ESV A4C 5 mL    IVSd 0.9 0.6 - 0.9 cm    LVIDd 3.9 3.9 - 5.3 cm    LVIDs 2.8 cm    LVOT Diameter 2.0 cm    LVOT Mean Gradient 3 mmHg    LVOT VTI 19.8 cm    LVOT Peak Velocity 1.1 m/s    LVOT Peak Gradient 5 mmHg    LVPWd 0.8 0.6 - 0.9 cm    LV E' Lateral Velocity 8 cm/s    LV E' Septal Velocity 5 cm/s    LV Ejection Fraction A2C 64 %    LV Ejection Fraction A4C 84 %    LVOT Area 3.1 cm2    LVOT SV 62.2 ml    LA Minor Axis 4.6 cm    LA Major [place] 5.4 cm    LA Area 2C 14.1 cm2    LA Area 4C 13.9 cm2    LA Volume BP 34 22 - 52 mL    LA Diameter 2.7 cm    AV Mean Gradient 5 mmHg    AV VTI 23.9 cm    AV Mean Velocity 1.0 m/s    AV Peak Velocity 1.5 m/s    AV Peak Gradient 9 mmHg    AV Area by VTI 2.6 cm2    AV Area by Peak Velocity 2.3 cm2    Aortic Root 2.9 cm    Ascending Aorta 2.4 cm    MR VTI 19.6 cm    MV Mean Gradient 1 mmHg    MV VTI 20.0 cm    MV Mean Velocity 0.5 m/s    MR Peak Velocity 0.9 m/s    MR Peak Gradient 3 mmHg    MV Max Velocity 1.0 m/s    MV Peak Gradient 4 mmHg    MV A Velocity 0.95 m/s    MV E Velocity 0.63 m/s    MV Area by VTI 3.1 cm2    PV Mean Gradient 2 mmHg    PV VTI 15.7 cm    PV Mean Velocity 0.7 m/s    PV Max Velocity 1.0 m/s    PV Peak Gradient 4 mmHg    TR Max Velocity 2.09 m/s    TR Peak Gradient 17 mmHg    TAPSE 1.8 1.7 cm Fractional Shortening 2D 28 28 - 44 %    LV ESV Index A4C 3 mL/m2    LV EDV Index A4C 17 mL/m2    LV ESV Index A2C 2 mL/m2    LV EDV Index A2C 6 mL/m2    LVIDd Index 2.11 cm/m2    LVIDs Index 1.51 cm/m2    LV RWT Ratio 0.41     LV Mass 2D 97.4 67 - 162 g    LV Mass 2D Index 52.6 43 - 95 g/m2    MV E/A 0.66     E/E' Ratio (Averaged) 10.24     E/E' Lateral 7.88     E/E' Septal 12.60     LA Volume Index BP 18 16 - 34 ml/m2    LVOT Stroke Volume Index 33.6 mL/m2    LA Size Index 1.46 cm/m2    LA/AO Root Ratio 0.93     Ao Root Index 1.57 cm/m2    Ascending Aorta Index 1.30 cm/m2    AV Velocity Ratio 0.73     LVOT:AV VTI Index 0.83     MAURICIO/BSA VTI 1.4 cm2/m2    MAURICIO/BSA Peak Velocity 1.2 cm2/m2    MV:LVOT VTI Index 1.01     Est. RA Pressure 3 mmHg    RVSP 20 mmHg    RV Basal Dimension 3.3 cm    RV Mid Dimension 2.5 cm    RA Area 4C 13.0 cm2   D DIMER    Collection Time: 07/14/22 11:45 AM   Result Value Ref Range    D DIMER 0.56 (H) <0.50 ug/ml(FEU)        XR CHEST SNGL V   Final Result   No Acute Disease.             Assessment:     Hospital Problems  Date Reviewed: 5/31/2022          Codes Class Noted POA    * (Principal) Chest pain ICD-10-CM: R07.9  ICD-9-CM: 786.50  7/13/2022 Unknown          Dysphagia, intermittent heartburn,  Chest pain, appear to be not cardiac at this time, no cardiac ischemia    Plan:   Continue cardiac monitoring  Continue beta-blocker and calcium blocker  Hold today's heparin  Schedule patient for EGD in the morning    Indication risk and option discussed with patient    Signed By: Adele Clark MD     July 14, 2022         Thank you for allowing me to participate in this patients care  Cc Referring Physician   Alex Chen MD

## 2022-07-15 NOTE — DISCHARGE SUMMARY
Admit date: 7/13/2022   Admitting Provider: Colette Marino MD    Discharge date: 7/15/2022  Discharging Provider: Tg Winter NP      * Admission Diagnoses: Chest pain [R07.9]    * Discharge Diagnoses:    Hospital Problems as of 7/15/2022 Date Reviewed: 5/31/2022          Codes Class Noted - Resolved POA    * (Principal) Chest pain ICD-10-CM: R07.9  ICD-9-CM: 786.50  7/13/2022 - Present Unknown              * Hospital Course: Alexandro Arana a 73 y.o. female with PMH of diabetes, hypertension, HLP, COPD and RLS. She presented to the ED with chief complaint of chest pain. She was playing with her granddaughter in the pool (with moderate exertion), sharp in nature, rated 10/10, lasted few seconds but multiple episodes. Radiates to her back, worsened with cough/ sneeze. Patient also rports frequent sensation of food stucking in food track, that spontaneously passes or regurgitate out. No food impaction currently. However, in free-standing ED, she had an episode of pressure like mid-sternal chest pain, relieved with SL nitro. Also associated with nausea without vomiting, no diaphoresis.      In the ED, noted hypertensive. Troponin negative x2. Admitted due to elevated HEART score.  Mildly elevated D-dimer 0.56. Echocardiogram shows EF 65 to 92% grade 1 diastolic dysfunction. Medically cleared by cardiology. Outpatient follow-up in 1 month. EGD showed erosive gastritis, bx taken. Patient stable for discharge. * Procedures:   Procedure(s):  ESOPHAGOGASTRODUODENAL (EGD) BIOPSY      Consults: Cardiology and GI    Significant Diagnostic Studies: {  Recent Results (from the past 24 hour(s))   D DIMER    Collection Time: 07/14/22 11:45 AM   Result Value Ref Range    D DIMER 0.56 (H) <0.50 ug/ml(FEU)         Discharge Exam:  Vitals and nursing note reviewed. Constitutional:       Appearance: Normal appearance. Eyes:      Extraocular Movements: Extraocular movements intact.    Cardiovascular:      Rate and Rhythm: Normal rate. Pulmonary:      Breath sounds: Normal breath sounds. Abdominal:      General: Bowel sounds are normal.   Skin:     General: Skin is warm and dry. Neurological:      Mental Status: She is alert and oriented to person, place, and time. * Discharge Condition: improved  * Disposition: Home    Discharge Medications:  Current Discharge Medication List      START taking these medications    Details   omeprazole (PRILOSEC) 40 mg capsule Take 1 Capsule by mouth daily for 30 days. Qty: 30 Capsule, Refills: 0  Start date: 7/15/2022, End date: 8/14/2022         CONTINUE these medications which have NOT CHANGED    Details   albuterol (PROVENTIL HFA, VENTOLIN HFA, PROAIR HFA) 90 mcg/actuation inhaler Take 2 Puffs by inhalation every four (4) hours as needed for Wheezing. losartan-hydroCHLOROthiazide (Hyzaar) 100-25 mg per tablet Take 1 Tablet by mouth daily. meloxicam (MOBIC) 7.5 mg tablet TAKE 1 TABLET BY MOUTH DAILY  Qty: 90 Tablet, Refills: 3  Start date: 7/13/2022      LORazepam (ATIVAN) 0.5 mg tablet TAKE 1-2 TABS BY MOUTH TWICE A DAY AS NEEDED  Qty: 30 Tablet, Refills: 0    Associated Diagnoses: Anxiety      gabapentin (NEURONTIN) 300 mg capsule Take 1 Capsule by mouth three (3) times daily. Max Daily Amount: 900 mg. Qty: 270 Capsule, Refills: 3    Associated Diagnoses: Pain in both lower extremities      rOPINIRole (REQUIP) 4 mg tab TAB TAKE 1 TABLET BY MOUTH TWO TIMES A DAY  Qty: 180 Tablet, Refills: 3      atorvastatin (LIPITOR) 10 mg tablet TAKE 1 TABLET BY MOUTH DAILY AT BEDTIME  Qty: 90 Tablet, Refills: 3      amLODIPine (NORVASC) 10 mg tablet TAKE 1 TABLET BY MOUTH DAILY  Qty: 90 Tablet, Refills: 3      levothyroxine (SYNTHROID) 50 mcg tablet Take 1 Tablet by mouth every morning. Qty: 90 Tablet, Refills: 3      metFORMIN (GLUCOPHAGE) 500 mg tablet Take 2 Tablets by mouth two (2) times a day.   Qty: 360 Tablet, Refills: 3    Associated Diagnoses: Type 2 diabetes mellitus without complication, without long-term current use of insulin (HCC)      fluticasone-umeclidinium-vilanterol (Trelegy Ellipta) 100-62.5-25 mcg inhaler Take 1 Puff by inhalation daily. Qty: 60 Each, Refills: 5    Associated Diagnoses: Chronic obstructive pulmonary disease, unspecified COPD type (HCC)      cyanocobalamin, vitamin B-12, 2,000 mcg tab Take 2,000 mcg by mouth daily. aspirin delayed-release 81 mg tablet Take 81 mg by mouth daily. * Follow-up Care/Patient Instructions:   Activity: Activity as tolerated  Diet: Cardiac Diet  Wound Care: None needed    Follow-up Information     Follow up With Specialties Details Why Contact Info    Alex Chen MD Internal Medicine Physician In 1 week  [facility]  [address]      Shaheen Moise MD Gastroenterology, Internal Medicine Physician In 1 month  [address] [place] [address], [address] [facility] Vascular Surgery, Cardiovascular Disease Physician In 1 month  [place] [address]  [phone]          Time Spent: > 35 minutes      Signed:  Rock Hodgkins, NP  7/15/2022  10:26 AM

## 2022-07-15 NOTE — PROGRESS NOTES
Problem: Falls - Risk of  Goal: *Absence of Falls  Description: Document Mark Sol Fall Risk and appropriate interventions in the flowsheet.   Outcome: Progressing Towards Goal  Note: Fall Risk Interventions:            Medication Interventions: Teach patient to arise slowly                   Problem: Patient Education: Go to Patient Education Activity  Goal: Patient/Family Education  Outcome: Progressing Towards Goal

## 2022-07-18 NOTE — ANESTHESIA POSTPROCEDURE EVALUATION
Procedure(s):  ESOPHAGOGASTRODUODENAL (EGD) BIOPSY. total IV anesthesia, MAC    Anesthesia Post Evaluation      Multimodal analgesia: multimodal analgesia not used between 6 hours prior to anesthesia start to PACU discharge  Patient location during evaluation: bedside (Endoscopy suite)  Patient participation: complete - patient cannot participate  Level of consciousness: sleepy but conscious  Pain score: 0  Pain management: adequate  Airway patency: patent  Anesthetic complications: no  Cardiovascular status: acceptable  Respiratory status: acceptable and nasal cannula  Hydration status: acceptable  Comments: This patient remained on the stretcher. The patient was handed off to the endoscopy nursing team.  All questions regarding pre-, intra-, and postoperative care were answered.   Post anesthesia nausea and vomiting:  none      INITIAL Post-op Vital signs:   Vitals Value Taken Time   /75 07/15/22 1505   Temp 36.5 °C (97.7 °F) 07/15/22 1505   Pulse 75 07/15/22 1505   Resp 18 07/15/22 1505   SpO2 93 % 07/15/22 1505

## 2023-01-20 PROBLEM — F32.0 CURRENT MILD EPISODE OF MAJOR DEPRESSIVE DISORDER, UNSPECIFIED WHETHER RECURRENT (HCC): Status: ACTIVE | Noted: 2023-01-20

## 2023-01-20 PROBLEM — E03.9 ACQUIRED HYPOTHYROIDISM: Status: ACTIVE | Noted: 2023-01-20

## 2023-01-20 PROBLEM — F13.20 BENZODIAZEPINE DEPENDENCE (HCC): Status: ACTIVE | Noted: 2023-01-20

## 2023-01-20 PROBLEM — R07.9 CHEST PAIN: Status: RESOLVED | Noted: 2022-07-13 | Resolved: 2023-01-20

## 2023-01-23 ENCOUNTER — HOSPITAL ENCOUNTER (OUTPATIENT)
Age: 73
Setting detail: OUTPATIENT SURGERY
Discharge: HOME | End: 2023-01-23
Attending: INTERNAL MEDICINE | Admitting: INTERNAL MEDICINE
Payer: MEDICARE

## 2023-01-23 ENCOUNTER — ANESTHESIA EVENT (OUTPATIENT)
Dept: ENDOSCOPY | Age: 73
End: 2023-01-23
Payer: MEDICARE

## 2023-01-23 ENCOUNTER — APPOINTMENT (OUTPATIENT)
Dept: ENDOSCOPY | Age: 73
End: 2023-01-23
Attending: INTERNAL MEDICINE
Payer: MEDICARE

## 2023-01-23 ENCOUNTER — ANESTHESIA (OUTPATIENT)
Dept: ENDOSCOPY | Age: 73
End: 2023-01-23
Payer: MEDICARE

## 2023-01-23 VITALS
RESPIRATION RATE: 16 BRPM | HEART RATE: 64 BPM | SYSTOLIC BLOOD PRESSURE: 144 MMHG | DIASTOLIC BLOOD PRESSURE: 76 MMHG | OXYGEN SATURATION: 98 % | TEMPERATURE: 97.4 F

## 2023-01-23 LAB
GLUCOSE BLD STRIP.AUTO-MCNC: 122 MG/DL (ref 65–100)
PERFORMED BY, TECHID: ABNORMAL

## 2023-01-23 PROCEDURE — 2709999900 HC NON-CHARGEABLE SUPPLY: Performed by: INTERNAL MEDICINE

## 2023-01-23 PROCEDURE — 76040000007: Performed by: INTERNAL MEDICINE

## 2023-01-23 PROCEDURE — 82962 GLUCOSE BLOOD TEST: CPT

## 2023-01-23 PROCEDURE — 74011250636 HC RX REV CODE- 250/636: Performed by: INTERNAL MEDICINE

## 2023-01-23 PROCEDURE — 74011000250 HC RX REV CODE- 250: Performed by: NURSE ANESTHETIST, CERTIFIED REGISTERED

## 2023-01-23 PROCEDURE — 74011000258 HC RX REV CODE- 258: Performed by: NURSE ANESTHETIST, CERTIFIED REGISTERED

## 2023-01-23 PROCEDURE — 76060000032 HC ANESTHESIA 0.5 TO 1 HR: Performed by: INTERNAL MEDICINE

## 2023-01-23 PROCEDURE — 74011250636 HC RX REV CODE- 250/636: Performed by: NURSE ANESTHETIST, CERTIFIED REGISTERED

## 2023-01-23 RX ORDER — PROPOFOL 10 MG/ML
INJECTION, EMULSION INTRAVENOUS AS NEEDED
Status: DISCONTINUED | OUTPATIENT
Start: 2023-01-23 | End: 2023-01-23 | Stop reason: HOSPADM

## 2023-01-23 RX ORDER — SODIUM CHLORIDE 9 MG/ML
INJECTION, SOLUTION INTRAVENOUS
Status: DISCONTINUED | OUTPATIENT
Start: 2023-01-23 | End: 2023-01-23 | Stop reason: HOSPADM

## 2023-01-23 RX ORDER — SODIUM CHLORIDE, SODIUM LACTATE, POTASSIUM CHLORIDE, CALCIUM CHLORIDE 600; 310; 30; 20 MG/100ML; MG/100ML; MG/100ML; MG/100ML
50 INJECTION, SOLUTION INTRAVENOUS CONTINUOUS
Status: DISCONTINUED | OUTPATIENT
Start: 2023-01-23 | End: 2023-01-23 | Stop reason: HOSPADM

## 2023-01-23 RX ORDER — SODIUM CHLORIDE 9 MG/ML
25 INJECTION, SOLUTION INTRAVENOUS CONTINUOUS
Status: DISCONTINUED | OUTPATIENT
Start: 2023-01-23 | End: 2023-01-23 | Stop reason: HOSPADM

## 2023-01-23 RX ORDER — LIDOCAINE HYDROCHLORIDE 20 MG/ML
INJECTION, SOLUTION EPIDURAL; INFILTRATION; INTRACAUDAL; PERINEURAL AS NEEDED
Status: DISCONTINUED | OUTPATIENT
Start: 2023-01-23 | End: 2023-01-23 | Stop reason: HOSPADM

## 2023-01-23 RX ADMIN — LIDOCAINE HYDROCHLORIDE 40 MG: 20 INJECTION, SOLUTION EPIDURAL; INFILTRATION; INTRACAUDAL; PERINEURAL at 09:02

## 2023-01-23 RX ADMIN — PROPOFOL 100 MG: 10 INJECTION, EMULSION INTRAVENOUS at 09:07

## 2023-01-23 RX ADMIN — SODIUM CHLORIDE: 9 INJECTION, SOLUTION INTRAVENOUS at 08:50

## 2023-01-23 RX ADMIN — PROPOFOL 100 MG: 10 INJECTION, EMULSION INTRAVENOUS at 09:02

## 2023-01-23 RX ADMIN — PROPOFOL 50 MG: 10 INJECTION, EMULSION INTRAVENOUS at 09:11

## 2023-01-23 RX ADMIN — SODIUM CHLORIDE 25 ML/HR: 9 INJECTION, SOLUTION INTRAVENOUS at 08:05

## 2023-01-23 NOTE — ANESTHESIA POSTPROCEDURE EVALUATION
Procedure(s):  COLONOSCOPY.    total IV anesthesia, MAC    Anesthesia Post Evaluation      Multimodal analgesia: multimodal analgesia not used between 6 hours prior to anesthesia start to PACU discharge  Patient location during evaluation: bedside  Patient participation: waiting for patient participation  Level of consciousness: responsive to light touch  Pain score: 0  Airway patency: patent  Anesthetic complications: no  Cardiovascular status: acceptable and stable  Respiratory status: acceptable and nasal cannula  Hydration status: acceptable  Post anesthesia nausea and vomiting:  none  Final Post Anesthesia Temperature Assessment:  Normothermia (36.0-37.5 degrees C)      INITIAL Post-op Vital signs: No vitals data found for the desired time range.

## 2023-01-23 NOTE — ANESTHESIA PREPROCEDURE EVALUATION
Relevant Problems   RESPIRATORY SYSTEM   (+) Chronic obstructive pulmonary disease (HCC)      NEUROLOGY   (+) Current mild episode of major depressive disorder, unspecified whether recurrent (HCC)      CARDIOVASCULAR   (+) Hypertension complicating diabetes ([facility]      ENDOCRINE   (+) Acquired hypothyroidism   (+) Severe obesity (BMI 35.0-35.9 with comorbidity) (HCC)   (+) Type 2 diabetes mellitus with stage 3a chronic kidney disease, without long-term current use of insulin ([facility]     2022 EGD with 200mg propofol    Anesthetic History   No history of anesthetic complications            Review of Systems / Medical History  Patient summary reviewed and pertinent labs reviewed    Pulmonary    COPD    Sleep apnea: No treatment  Shortness of breath         Neuro/Psych         Psychiatric history     Cardiovascular    Hypertension          Hyperlipidemia      Comments: Echo:    Left Ventricle: Normal left ventricular systolic function with a visually estimated EF of 65 - 70%. Left ventricle size is normal. Normal wall thickness. Normal wall motion. Grade I diastolic dysfunction with normal LAP.   Mitral Valve: Mild annular calcification at the posterior leaflet of the mitral valve. GI/Hepatic/Renal         Renal disease: CRI       Endo/Other    Diabetes  Hypothyroidism  Obesity     Other Findings          Past Medical History:   Diagnosis Date    Bacterial skin infection of trunk 09/02/2020    Chronic kidney disease     Chronic obstructive pulmonary disease ([facility] 03/31/2022    Colon polyp     [facility] 05/24/2011    Environmental allergies     HTN (hypertension) 05/24/2011    Hyperlipidemia 05/24/2011    Nausea & vomiting     Panic attacks     Psychiatric disorder     RLS (restless legs syndrome) 05/24/2011       Past Surgical History:   Procedure Laterality Date    ENDOSCOPY, COLON, DIAGNOSTIC  3/20/12    OK. Rep 10 yrs.  Eunice Dakins)    HX APPENDECTOMY      HX CHOLECYSTECTOMY Current Outpatient Medications   Medication Instructions    albuterol (PROVENTIL HFA, VENTOLIN HFA, PROAIR HFA) 90 mcg/actuation inhaler 2 Puffs, Inhalation, EVERY 4 HOURS AS NEEDED    amLODIPine (NORVASC) 10 mg, Oral, DAILY    aspirin delayed-release 81 mg, Oral, DAILY    atorvastatin (LIPITOR) 20 mg, Oral, EVERY BEDTIME    cyanocobalamin (vitamin B-12) 2,000 mcg, Oral, DAILY    fluticasone-umeclidinium-vilanterol (Trelegy Ellipta) 100-62.5-25 mcg inhaler 1 Puff, Inhalation, DAILY    gabapentin (NEURONTIN) 100 mg, Oral, 3 TIMES DAILY    levothyroxine (SYNTHROID) 50 mcg, Oral, 6AM    LORazepam (ATIVAN) 0.5 mg tablet TAKE 1-2 TABS BY MOUTH TWICE A DAY AS NEEDED    losartan-hydroCHLOROthiazide (Hyzaar) 100-25 mg per tablet 1 Tablet, Oral, DAILY    meloxicam (MOBIC) 7.5 mg, Oral, DAILY    metFORMIN ER (GLUCOPHAGE XR) 1,000 mg, Oral, 2 TIMES DAILY    omeprazole (PRILOSEC) 40 mg capsule TAKE 1 CAPSULE BY MOUTH ONCE DAILY    rOPINIRole (REQUIP) 4 mg tab TAB TAKE 1 TABLET BY MOUTH TWO TIMES A DAY       No current facility-administered medications for this visit. No current outpatient medications on file. Facility-Administered Medications Ordered in Other Visits   Medication Dose Route Frequency    lactated Ringers infusion  50 mL/hr IntraVENous CONTINUOUS       No data found.     Lab Results   Component Value Date/Time    WBC 7.5 07/14/2022 06:38 AM    HGB (POC) 13.4 02/17/2020 10:33 AM    HGB 11.5 07/14/2022 06:38 AM    HCT (POC) 42.7 02/17/2020 10:33 AM    HCT 36.8 07/14/2022 06:38 AM    PLATELET 752 57/72/6383 06:38 AM    MCV 84.4 07/14/2022 06:38 AM     Lab Results   Component Value Date/Time    Sodium 140 01/20/2023 12:00 PM    Potassium 4.4 01/20/2023 12:00 PM    Chloride 98 01/20/2023 12:00 PM    CO2 28 01/20/2023 12:00 PM    Anion gap 6 07/14/2022 06:38 AM    Glucose 109 (H) 01/20/2023 12:00 PM    BUN 11 01/20/2023 12:00 PM    Creatinine 0.74 01/20/2023 12:00 PM    BUN/Creatinine ratio 15 01/20/2023 12:00 PM    GFR est AA >60 07/14/2022 06:38 AM    GFR est non-AA >60 07/14/2022 06:38 AM    Calcium 10.2 01/20/2023 12:00 PM     No results found for: APTT, PTP, INR, INREXT, INREXT  Lab Results   Component Value Date/Time    Glucose 109 (H) 01/20/2023 12:00 PM     Physical Exam    Airway  Mallampati: III  TM Distance: 4 - 6 cm  Neck ROM: normal range of motion   Mouth opening: Normal     Cardiovascular    Rhythm: regular  Rate: normal         Dental    Dentition: Edentulous     Pulmonary  Breath sounds clear to auscultation               Abdominal  GI exam deferred       Other Findings            Anesthetic Plan    ASA: 3  Anesthesia type: total IV anesthesia and MAC    Monitoring Plan: Continuous noninvasive hemodynamic monitoring      Induction: Intravenous  Anesthetic plan and risks discussed with: Patient

## 2023-05-15 ENCOUNTER — TRANSCRIBE ORDERS (OUTPATIENT)
Facility: HOSPITAL | Age: 73
End: 2023-05-15

## 2023-05-15 DIAGNOSIS — Z12.31 OTHER SCREENING MAMMOGRAM: Primary | ICD-10-CM

## 2023-07-17 ENCOUNTER — HOSPITAL ENCOUNTER (OUTPATIENT)
Facility: HOSPITAL | Age: 73
Discharge: HOME | End: 2023-07-20
Payer: MEDICARE

## 2023-07-17 DIAGNOSIS — Z12.31 OTHER SCREENING MAMMOGRAM: ICD-10-CM

## 2023-07-17 PROCEDURE — 77063 BREAST TOMOSYNTHESIS BI: CPT

## 2023-10-18 ENCOUNTER — HOSPITAL ENCOUNTER (EMERGENCY)
Facility: HOSPITAL | Age: 73
Discharge: HOME | End: 2023-10-18
Attending: EMERGENCY MEDICINE
Payer: MEDICARE

## 2023-10-18 ENCOUNTER — APPOINTMENT (OUTPATIENT)
Facility: HOSPITAL | Age: 73
End: 2023-10-18
Payer: MEDICARE

## 2023-10-18 VITALS
RESPIRATION RATE: 20 BRPM | WEIGHT: 175 LBS | HEART RATE: 67 BPM | OXYGEN SATURATION: 93 % | TEMPERATURE: 97.8 F | HEIGHT: 62 IN | SYSTOLIC BLOOD PRESSURE: 123 MMHG | DIASTOLIC BLOOD PRESSURE: 76 MMHG | BODY MASS INDEX: 32.2 KG/M2

## 2023-10-18 VITALS
DIASTOLIC BLOOD PRESSURE: 84 MMHG | RESPIRATION RATE: 12 BRPM | HEART RATE: 74 BPM | TEMPERATURE: 98.8 F | SYSTOLIC BLOOD PRESSURE: 117 MMHG | BODY MASS INDEX: 32.2 KG/M2 | WEIGHT: 175 LBS | HEIGHT: 62 IN | OXYGEN SATURATION: 91 %

## 2023-10-18 DIAGNOSIS — R07.9 CHEST PAIN, UNSPECIFIED TYPE: Primary | ICD-10-CM

## 2023-10-18 DIAGNOSIS — R91.1 PULMONARY NODULE: ICD-10-CM

## 2023-10-18 LAB
ALBUMIN SERPL-MCNC: 3.7 G/DL (ref 3.5–5)
ALBUMIN SERPL-MCNC: 3.9 G/DL (ref 3.5–5)
ALBUMIN/GLOB SERPL: 0.8 (ref 1.1–2.2)
ALBUMIN/GLOB SERPL: 0.8 (ref 1.1–2.2)
ALP SERPL-CCNC: 135 U/L (ref 45–117)
ALP SERPL-CCNC: 144 U/L (ref 45–117)
ALT SERPL-CCNC: 29 U/L (ref 12–78)
ALT SERPL-CCNC: 30 U/L (ref 12–78)
ANION GAP SERPL CALC-SCNC: 6 MMOL/L (ref 5–15)
ANION GAP SERPL CALC-SCNC: 6 MMOL/L (ref 5–15)
AST SERPL W P-5'-P-CCNC: 26 U/L (ref 15–37)
AST SERPL W P-5'-P-CCNC: 29 U/L (ref 15–37)
BASOPHILS # BLD: 0 K/UL (ref 0–0.1)
BASOPHILS # BLD: 0.1 K/UL (ref 0–0.1)
BASOPHILS NFR BLD: 0 % (ref 0–1)
BASOPHILS NFR BLD: 1 % (ref 0–1)
BILIRUB SERPL-MCNC: 0.3 MG/DL (ref 0.2–1)
BILIRUB SERPL-MCNC: 0.4 MG/DL (ref 0.2–1)
BUN SERPL-MCNC: 12 MG/DL (ref 6–20)
BUN SERPL-MCNC: 15 MG/DL (ref 6–20)
BUN/CREAT SERPL: 14 (ref 12–20)
BUN/CREAT SERPL: 16 (ref 12–20)
CA-I BLD-MCNC: 9.5 MG/DL (ref 8.5–10.1)
CA-I BLD-MCNC: 9.7 MG/DL (ref 8.5–10.1)
CHLORIDE SERPL-SCNC: 98 MMOL/L (ref 97–108)
CHLORIDE SERPL-SCNC: 99 MMOL/L (ref 97–108)
CO2 SERPL-SCNC: 33 MMOL/L (ref 21–32)
CO2 SERPL-SCNC: 34 MMOL/L (ref 21–32)
CREAT SERPL-MCNC: 0.76 MG/DL (ref 0.55–1.02)
CREAT SERPL-MCNC: 1.05 MG/DL (ref 0.55–1.02)
DIFFERENTIAL METHOD BLD: ABNORMAL
DIFFERENTIAL METHOD BLD: ABNORMAL
EOSINOPHIL # BLD: 0.2 K/UL (ref 0–0.4)
EOSINOPHIL # BLD: 0.2 K/UL (ref 0–0.4)
EOSINOPHIL NFR BLD: 1 % (ref 0–7)
EOSINOPHIL NFR BLD: 2 % (ref 0–7)
ERYTHROCYTE [DISTWIDTH] IN BLOOD BY AUTOMATED COUNT: 13.6 % (ref 11.5–14.5)
ERYTHROCYTE [DISTWIDTH] IN BLOOD BY AUTOMATED COUNT: 13.6 % (ref 11.5–14.5)
GLOBULIN SER CALC-MCNC: 4.6 G/DL (ref 2–4)
GLOBULIN SER CALC-MCNC: 4.7 G/DL (ref 2–4)
GLUCOSE SERPL-MCNC: 116 MG/DL (ref 65–100)
GLUCOSE SERPL-MCNC: 172 MG/DL (ref 65–100)
HCT VFR BLD AUTO: 34.5 % (ref 35–47)
HCT VFR BLD AUTO: 35.1 % (ref 35–47)
HGB BLD-MCNC: 11 G/DL (ref 11.5–16)
HGB BLD-MCNC: 11.2 G/DL (ref 11.5–16)
IMM GRANULOCYTES # BLD AUTO: 0 K/UL (ref 0–0.04)
IMM GRANULOCYTES # BLD AUTO: 0.1 K/UL (ref 0–0.04)
IMM GRANULOCYTES NFR BLD AUTO: 0 % (ref 0–0.5)
IMM GRANULOCYTES NFR BLD AUTO: 0 % (ref 0–0.5)
LYMPHOCYTES # BLD: 2.6 K/UL (ref 0.8–3.5)
LYMPHOCYTES # BLD: 2.9 K/UL (ref 0.8–3.5)
LYMPHOCYTES NFR BLD: 18 % (ref 12–49)
LYMPHOCYTES NFR BLD: 32 % (ref 12–49)
MAGNESIUM SERPL-MCNC: 1.7 MG/DL (ref 1.6–2.4)
MAGNESIUM SERPL-MCNC: 1.8 MG/DL (ref 1.6–2.4)
MCH RBC QN AUTO: 26.8 PG (ref 26–34)
MCH RBC QN AUTO: 27.2 PG (ref 26–34)
MCHC RBC AUTO-ENTMCNC: 31.9 G/DL (ref 30–36.5)
MCHC RBC AUTO-ENTMCNC: 31.9 G/DL (ref 30–36.5)
MCV RBC AUTO: 84 FL (ref 80–99)
MCV RBC AUTO: 85.4 FL (ref 80–99)
MONOCYTES # BLD: 0.7 K/UL (ref 0–1)
MONOCYTES # BLD: 1 K/UL (ref 0–1)
MONOCYTES NFR BLD: 7 % (ref 5–13)
MONOCYTES NFR BLD: 8 % (ref 5–13)
NEUTS SEG # BLD: 10.1 K/UL (ref 1.8–8)
NEUTS SEG # BLD: 5.2 K/UL (ref 1.8–8)
NEUTS SEG NFR BLD: 58 % (ref 32–75)
NEUTS SEG NFR BLD: 73 % (ref 32–75)
NRBC # BLD: 0 K/UL (ref 0–0.01)
NRBC BLD-RTO: 0 PER 100 WBC
PLATELET # BLD AUTO: 118 K/UL (ref 150–400)
PLATELET # BLD AUTO: 258 K/UL (ref 150–400)
PMV BLD AUTO: 11.4 FL (ref 8.9–12.9)
PMV BLD AUTO: 11.4 FL (ref 8.9–12.9)
POTASSIUM SERPL-SCNC: 3.7 MMOL/L (ref 3.5–5.1)
POTASSIUM SERPL-SCNC: 3.8 MMOL/L (ref 3.5–5.1)
PROT SERPL-MCNC: 8.4 G/DL (ref 6.4–8.2)
PROT SERPL-MCNC: 8.5 G/DL (ref 6.4–8.2)
RBC # BLD AUTO: 4.04 M/UL (ref 3.8–5.2)
RBC # BLD AUTO: 4.18 M/UL (ref 3.8–5.2)
SODIUM SERPL-SCNC: 138 MMOL/L (ref 136–145)
SODIUM SERPL-SCNC: 138 MMOL/L (ref 136–145)
TROPONIN I SERPL HS-MCNC: 10 NG/L (ref 0–51)
TROPONIN I SERPL HS-MCNC: 8 NG/L (ref 0–51)
TROPONIN I SERPL HS-MCNC: 8 NG/L (ref 0–51)
TROPONIN I SERPL HS-MCNC: 9 NG/L (ref 0–51)
WBC # BLD AUTO: 13.9 K/UL (ref 3.6–11)
WBC # BLD AUTO: 9.1 K/UL (ref 3.6–11)

## 2023-10-18 PROCEDURE — 83735 ASSAY OF MAGNESIUM: CPT

## 2023-10-18 PROCEDURE — 6360000004 HC RX CONTRAST MEDICATION: Performed by: EMERGENCY MEDICINE

## 2023-10-18 PROCEDURE — 84484 ASSAY OF TROPONIN QUANT: CPT

## 2023-10-18 PROCEDURE — 99285 EMERGENCY DEPT VISIT HI MDM: CPT

## 2023-10-18 PROCEDURE — 80053 COMPREHEN METABOLIC PANEL: CPT

## 2023-10-18 PROCEDURE — 93005 ELECTROCARDIOGRAM TRACING: CPT | Performed by: EMERGENCY MEDICINE

## 2023-10-18 PROCEDURE — 6370000000 HC RX 637 (ALT 250 FOR IP): Performed by: EMERGENCY MEDICINE

## 2023-10-18 PROCEDURE — 71045 X-RAY EXAM CHEST 1 VIEW: CPT

## 2023-10-18 PROCEDURE — 36415 COLL VENOUS BLD VENIPUNCTURE: CPT

## 2023-10-18 PROCEDURE — 96374 THER/PROPH/DIAG INJ IV PUSH: CPT

## 2023-10-18 PROCEDURE — 6360000002 HC RX W HCPCS: Performed by: EMERGENCY MEDICINE

## 2023-10-18 PROCEDURE — 71275 CT ANGIOGRAPHY CHEST: CPT

## 2023-10-18 PROCEDURE — 85025 COMPLETE CBC W/AUTO DIFF WBC: CPT

## 2023-10-18 RX ORDER — TRAMADOL HYDROCHLORIDE 50 MG/1
50 TABLET ORAL EVERY 6 HOURS PRN
Qty: 12 TABLET | Refills: 0 | Status: SHIPPED | OUTPATIENT
Start: 2023-10-18 | End: 2023-10-21

## 2023-10-18 RX ORDER — MORPHINE SULFATE 4 MG/ML
4 INJECTION, SOLUTION INTRAMUSCULAR; INTRAVENOUS
Status: COMPLETED | OUTPATIENT
Start: 2023-10-18 | End: 2023-10-18

## 2023-10-18 RX ORDER — ASPIRIN 325 MG
325 TABLET ORAL
Status: COMPLETED | OUTPATIENT
Start: 2023-10-18 | End: 2023-10-18

## 2023-10-18 RX ADMIN — MORPHINE SULFATE 4 MG: 4 INJECTION, SOLUTION INTRAMUSCULAR; INTRAVENOUS at 20:13

## 2023-10-18 RX ADMIN — IOPAMIDOL 100 ML: 755 INJECTION, SOLUTION INTRAVENOUS at 18:52

## 2023-10-18 RX ADMIN — ASPIRIN 325 MG: 325 TABLET ORAL at 01:44

## 2023-10-18 ASSESSMENT — PAIN - FUNCTIONAL ASSESSMENT
PAIN_FUNCTIONAL_ASSESSMENT: 0-10
PAIN_FUNCTIONAL_ASSESSMENT: NONE - DENIES PAIN
PAIN_FUNCTIONAL_ASSESSMENT: 0-10

## 2023-10-18 ASSESSMENT — LIFESTYLE VARIABLES
HOW OFTEN DO YOU HAVE A DRINK CONTAINING ALCOHOL: MONTHLY OR LESS
HOW MANY STANDARD DRINKS CONTAINING ALCOHOL DO YOU HAVE ON A TYPICAL DAY: PATIENT DOES NOT DRINK
HOW OFTEN DO YOU HAVE A DRINK CONTAINING ALCOHOL: NEVER
HOW MANY STANDARD DRINKS CONTAINING ALCOHOL DO YOU HAVE ON A TYPICAL DAY: 1 OR 2

## 2023-10-18 ASSESSMENT — PAIN SCALES - GENERAL
PAINLEVEL_OUTOF10: 9
PAINLEVEL_OUTOF10: 9

## 2023-10-18 ASSESSMENT — HEART SCORE
ECG: 0
ECG: 1

## 2023-10-18 ASSESSMENT — PAIN DESCRIPTION - LOCATION
LOCATION: CHEST
LOCATION: CHEST

## 2023-10-18 ASSESSMENT — PAIN DESCRIPTION - ORIENTATION: ORIENTATION: MID

## 2023-10-18 NOTE — ED PROVIDER NOTES
ACS, etc.           ED Course:     SEPSIS REASSESSMENT:  N/A - No sepsis suspected    ED Course as of 10/19/23 0954   Wed Oct 18, 2023   1807 Troponin, High Sensitivity: 9 [HAKAN]   1859 Normal sinus rhythm, rate of 76. Normal ID, QRS and QTc intervals. Normal axis. No ST segment elevation or depression. Overall normal sinus rhythm and normal EKG. Patient is pending CTA. Patient has not yet had it performed. Initial work-up is reassuring with negative troponin normal electrolytes and CBC panel. Heart score of about 4. Patient did have stress test about 1 year ago which did not show any inducible ischemia. Pain is not clearly exertional or pleuritic states it worsens when she tries to bend over, very atypical unlikely ACS. Care turned over to oncoming physician, Dr. Donna Sanchez at shift change pending CT scan results and repeat troponin   [HAKAN]   1922 Patient received in signout pending CT and repeat troponin. CT with no acute pathology [BQ]   2047 No significant change in troponin. Discussed results with patient and recommendation to follow-up with PCP as well as to establish care with cardiology. [BQ]      ED Course User Index  [BQ] Blaise Otero MD  [HAKAN] Daine Saint, DO       ------------------------------------------------------------------------------------------------------------    Consultations:     CONSULTS:  None    See the ED course section or MDM, if applicable for details on the content of consultations requested. Procedures and Critical Care     Performed by: Daine Saint, DO    Procedures             CRITICAL CARE NOTE :  9:57 AM  CRITICAL CARE TIME: Does not meet criteria for critical care time, 0 Minutes. Daine Saint, DO      Disposition       Disposition: Discharged Home    DISCHARGE: At this time, patient is stable and appropriate for discharge home. Patient demonstrates understanding of current diagnoses and is in agreement with the treatment plan.   They are advised that

## 2023-10-18 NOTE — ED TRIAGE NOTES
Pt c/o chest pain that radiates to back since last night @ 1800; pt was see at [facility] for same, last dose of tramadol @ [phone]

## 2023-10-18 NOTE — ED TRIAGE NOTES
Intermittant mid chest pain started 1800. Radiates laterally across chest. Pain worse with deep breath.

## 2023-10-18 NOTE — ED NOTES
Discharge instructions reviewed with patient. IV removed without complications. Patient is alert and oriented at discharge and in no acute distress.        Charmaine Priest RN  10/18/23 3136

## 2023-10-18 NOTE — ED PROVIDER NOTES
[facility] EMERGENCY DEPT  EMERGENCY DEPARTMENT HISTORY AND PHYSICAL EXAM      Date: 10/18/2023  Patient Name: Jasmyn Wiggins  MRN: [id number]  [address] [facility] 1950  Date of evaluation: 10/18/2023  Provider: Ly Davila MD   Note Started: 1:43 AM EDT 10/18/23    HISTORY OF PRESENT ILLNESS     Chief Complaint   Patient presents with    Chest Pain       History Provided By: Patient    HPI: Jasmyn Wiggins is a 81 y.o. female presents to the emergency department complaint of chest pain that started approximate 6:00 last night. Patient reports the pain is mid/substernal and radiates bilaterally across the chest.  Patient reports the pain is worse with movement or leaning forward. Patient denies fevers or chills, denies nausea or vomiting. PAST MEDICAL HISTORY   Past Medical History:  Past Medical History:   Diagnosis Date    Bacterial skin infection of trunk 09/02/2020    Chronic kidney disease     Chronic obstructive pulmonary disease ([facility]) 03/31/2022    Colon polyp     DM (diabetes mellitus) ([facility]) 05/24/2011    Environmental allergies     HTN (hypertension) 05/24/2011    Hyperlipidemia 05/24/2011    Nausea & vomiting     Panic attacks     Psychiatric disorder     RLS (restless legs syndrome) 05/24/2011       Past Surgical History:  Past Surgical History:   Procedure Laterality Date    APPENDECTOMY      CHOLECYSTECTOMY      COLONOSCOPY N/A 1/23/2023    COLONOSCOPY performed by Nima Clark MD at [address] [place] [address] Rd  3/20/12    OK. Rep 10 yrs.  Germ[place] No)       Family History:  Family History   Problem Relation Age of Onset    Thyroid Disease Mother        Social History:  Social History     Tobacco Use    Smoking status: Never    Smokeless tobacco: Never   Vaping Use    Vaping Use: Never used   Substance Use Topics    Alcohol use: Not Currently    Drug use: Never       Allergies:  No Known Allergies    PCP: Kristy Wright MD    Current Meds:   No current facility-administered

## 2023-10-18 NOTE — DISCHARGE INSTRUCTIONS
Bilirubin 0.3 0.2 - 1.0 mg/dL    AST 26 15 - 37 U/L    ALT 30 12 - 78 U/L    Alk Phosphatase 144 (H) 45 - 117 U/L    Total Protein 8.5 (H) 6.4 - 8.2 g/dL    Albumin 3.9 3.5 - 5.0 g/dL    Globulin 4.6 (H) 2.0 - 4.0 g/dL    Albumin/Globulin Ratio 0.8 (L) 1.1 - 2.2     Troponin    Collection Time: 10/18/23  1:33 AM   Result Value Ref Range    Troponin, High Sensitivity 8 0 - 51 ng/L   Magnesium    Collection Time: 10/18/23  1:33 AM   Result Value Ref Range    Magnesium 1.8 1.6 - 2.4 mg/dL   Troponin    Collection Time: 10/18/23  3:00 AM   Result Value Ref Range    Troponin, High Sensitivity 8 0 - 51 ng/L       Radiologic Studies  XR CHEST PORTABLE   Final Result      No acute process. ------------------------------------------------------------------------------------------------------------  The exam and treatment you received in the Emergency Department were for an urgent problem and are not intended as complete care. It is important that you follow-up with a doctor, nurse practitioner, or physician assistant to:  (1) confirm your diagnosis,  (2) re-evaluation of changes in your illness and treatment, and  (3) for ongoing care. Please take your discharge instructions with you when you go to your follow-up appointment. If you have any problem arranging a follow-up appointment, contact the Emergency Department. If your symptoms become worse or you do not improve as expected and you are unable to reach your health care provider, please return to the Emergency Department. We are available 24 hours a day. If a prescription has been provided, please have it filled as soon as possible to prevent a delay in treatment. If you have any questions or reservations about taking the medication due to side effects or interactions with other medications, please call your primary care provider or contact the ER.

## 2023-10-19 LAB
EKG ATRIAL RATE: 76 BPM
EKG ATRIAL RATE: 82 BPM
EKG DIAGNOSIS: NORMAL
EKG DIAGNOSIS: NORMAL
EKG P AXIS: -23 DEGREES
EKG P-R INTERVAL: 148 MS
EKG Q-T INTERVAL: 400 MS
EKG Q-T INTERVAL: 414 MS
EKG QRS DURATION: 86 MS
EKG QRS DURATION: 86 MS
EKG QTC CALCULATION (BAZETT): 458 MS
EKG QTC CALCULATION (BAZETT): 465 MS
EKG R AXIS: -12 DEGREES
EKG R AXIS: -4 DEGREES
EKG T AXIS: -3 DEGREES
EKG T AXIS: 34 DEGREES
EKG VENTRICULAR RATE: 76 BPM
EKG VENTRICULAR RATE: 79 BPM

## 2024-08-23 ENCOUNTER — TRANSCRIBE ORDERS (OUTPATIENT)
Facility: HOSPITAL | Age: 74
End: 2024-08-23

## 2024-08-23 DIAGNOSIS — Z12.31 VISIT FOR SCREENING MAMMOGRAM: Primary | ICD-10-CM

## 2024-10-15 ENCOUNTER — APPOINTMENT (OUTPATIENT)
Facility: HOSPITAL | Age: 74
End: 2024-10-15
Payer: MEDICARE

## 2024-10-15 ENCOUNTER — HOSPITAL ENCOUNTER (INPATIENT)
Facility: HOSPITAL | Age: 74
LOS: 4 days | Discharge: HOME | End: 2024-10-19
Attending: EMERGENCY MEDICINE | Admitting: HOSPITALIST
Payer: MEDICARE

## 2024-10-15 DIAGNOSIS — J44.1 COPD WITH ACUTE EXACERBATION (HCC): Primary | ICD-10-CM

## 2024-10-15 DIAGNOSIS — J06.9 UPPER RESPIRATORY TRACT INFECTION, UNSPECIFIED TYPE: ICD-10-CM

## 2024-10-15 DIAGNOSIS — R50.9 FEVER, UNSPECIFIED FEVER CAUSE: ICD-10-CM

## 2024-10-15 PROBLEM — A41.9 SEPSIS (HCC): Status: ACTIVE | Noted: 2024-10-15

## 2024-10-15 LAB
ALBUMIN SERPL-MCNC: 3.6 G/DL (ref 3.5–5)
ALBUMIN/GLOB SERPL: 0.8 (ref 1.1–2.2)
ALP SERPL-CCNC: 116 U/L (ref 45–117)
ALT SERPL-CCNC: 27 U/L (ref 12–78)
ANION GAP SERPL CALC-SCNC: 9 MMOL/L (ref 2–12)
AST SERPL W P-5'-P-CCNC: 23 U/L (ref 15–37)
BASOPHILS # BLD: 0.1 K/UL (ref 0–0.1)
BASOPHILS NFR BLD: 0 % (ref 0–1)
BILIRUB SERPL-MCNC: 0.5 MG/DL (ref 0.2–1)
BUN SERPL-MCNC: 10 MG/DL (ref 6–20)
BUN/CREAT SERPL: 10 (ref 12–20)
CA-I BLD-MCNC: 8.9 MG/DL (ref 8.5–10.1)
CHLORIDE SERPL-SCNC: 95 MMOL/L (ref 97–108)
CO2 SERPL-SCNC: 31 MMOL/L (ref 21–32)
CREAT SERPL-MCNC: 0.97 MG/DL (ref 0.55–1.02)
DIFFERENTIAL METHOD BLD: ABNORMAL
EOSINOPHIL # BLD: 0 K/UL (ref 0–0.4)
EOSINOPHIL NFR BLD: 0 % (ref 0–7)
ERYTHROCYTE [DISTWIDTH] IN BLOOD BY AUTOMATED COUNT: 12.9 % (ref 11.5–14.5)
FLUAV RNA SPEC QL NAA+PROBE: NOT DETECTED
FLUBV RNA SPEC QL NAA+PROBE: NOT DETECTED
GLOBULIN SER CALC-MCNC: 4.4 G/DL (ref 2–4)
GLUCOSE SERPL-MCNC: 174 MG/DL (ref 65–100)
HCT VFR BLD AUTO: 39.1 % (ref 35–47)
HGB BLD-MCNC: 12.7 G/DL (ref 11.5–16)
IMM GRANULOCYTES # BLD AUTO: 0 K/UL (ref 0–0.04)
IMM GRANULOCYTES NFR BLD AUTO: 0 % (ref 0–0.5)
LACTATE BLD-SCNC: 1.74 MMOL/L (ref 0.4–2)
LYMPHOCYTES # BLD: 2.4 K/UL (ref 0.8–3.5)
LYMPHOCYTES NFR BLD: 17 % (ref 12–49)
MCH RBC QN AUTO: 27.1 PG (ref 26–34)
MCHC RBC AUTO-ENTMCNC: 32.5 G/DL (ref 30–36.5)
MCV RBC AUTO: 83.4 FL (ref 80–99)
MONOCYTES # BLD: 1.5 K/UL (ref 0–1)
MONOCYTES NFR BLD: 11 % (ref 5–13)
NEUTS SEG # BLD: 10.2 K/UL (ref 1.8–8)
NEUTS SEG NFR BLD: 72 % (ref 32–75)
PERFORMED BY:: NORMAL
PLATELET # BLD AUTO: 252 K/UL (ref 150–400)
PMV BLD AUTO: 10.6 FL (ref 8.9–12.9)
POTASSIUM SERPL-SCNC: 3.8 MMOL/L (ref 3.5–5.1)
PROCALCITONIN SERPL-MCNC: 0.07 NG/ML
PROT SERPL-MCNC: 8 G/DL (ref 6.4–8.2)
RBC # BLD AUTO: 4.69 M/UL (ref 3.8–5.2)
SARS-COV-2 RNA RESP QL NAA+PROBE: NOT DETECTED
SODIUM SERPL-SCNC: 135 MMOL/L (ref 136–145)
TROPONIN I SERPL HS-MCNC: 14 NG/L (ref 0–51)
WBC # BLD AUTO: 14.1 K/UL (ref 3.6–11)

## 2024-10-15 PROCEDURE — 71275 CT ANGIOGRAPHY CHEST: CPT

## 2024-10-15 PROCEDURE — 36415 COLL VENOUS BLD VENIPUNCTURE: CPT

## 2024-10-15 PROCEDURE — 93005 ELECTROCARDIOGRAM TRACING: CPT | Performed by: EMERGENCY MEDICINE

## 2024-10-15 PROCEDURE — 99285 EMERGENCY DEPT VISIT HI MDM: CPT

## 2024-10-15 PROCEDURE — 6370000000 HC RX 637 (ALT 250 FOR IP): Performed by: EMERGENCY MEDICINE

## 2024-10-15 PROCEDURE — 96375 TX/PRO/DX INJ NEW DRUG ADDON: CPT

## 2024-10-15 PROCEDURE — 1100000000 HC RM PRIVATE

## 2024-10-15 PROCEDURE — 83605 ASSAY OF LACTIC ACID: CPT

## 2024-10-15 PROCEDURE — 87040 BLOOD CULTURE FOR BACTERIA: CPT

## 2024-10-15 PROCEDURE — 85025 COMPLETE CBC W/AUTO DIFF WBC: CPT

## 2024-10-15 PROCEDURE — 84145 PROCALCITONIN (PCT): CPT

## 2024-10-15 PROCEDURE — 71045 X-RAY EXAM CHEST 1 VIEW: CPT

## 2024-10-15 PROCEDURE — 96365 THER/PROPH/DIAG IV INF INIT: CPT

## 2024-10-15 PROCEDURE — 84484 ASSAY OF TROPONIN QUANT: CPT

## 2024-10-15 PROCEDURE — 87636 SARSCOV2 & INF A&B AMP PRB: CPT

## 2024-10-15 PROCEDURE — 6360000004 HC RX CONTRAST MEDICATION: Performed by: EMERGENCY MEDICINE

## 2024-10-15 PROCEDURE — 96374 THER/PROPH/DIAG INJ IV PUSH: CPT

## 2024-10-15 PROCEDURE — 6360000002 HC RX W HCPCS: Performed by: EMERGENCY MEDICINE

## 2024-10-15 PROCEDURE — 80053 COMPREHEN METABOLIC PANEL: CPT

## 2024-10-15 PROCEDURE — 2580000003 HC RX 258: Performed by: EMERGENCY MEDICINE

## 2024-10-15 RX ORDER — MAGNESIUM HYDROXIDE/ALUMINUM HYDROXICE/SIMETHICONE 120; 1200; 1200 MG/30ML; MG/30ML; MG/30ML
30 SUSPENSION ORAL EVERY 6 HOURS PRN
Status: DISCONTINUED | OUTPATIENT
Start: 2024-10-15 | End: 2024-10-19 | Stop reason: HOSPADM

## 2024-10-15 RX ORDER — ACETAMINOPHEN 650 MG/1
650 SUPPOSITORY RECTAL EVERY 6 HOURS PRN
Status: DISCONTINUED | OUTPATIENT
Start: 2024-10-15 | End: 2024-10-19 | Stop reason: HOSPADM

## 2024-10-15 RX ORDER — SODIUM CHLORIDE 0.9 % (FLUSH) 0.9 %
5-40 SYRINGE (ML) INJECTION PRN
Status: DISCONTINUED | OUTPATIENT
Start: 2024-10-15 | End: 2024-10-19 | Stop reason: HOSPADM

## 2024-10-15 RX ORDER — ACETAMINOPHEN 325 MG/1
650 TABLET ORAL
Status: COMPLETED | OUTPATIENT
Start: 2024-10-15 | End: 2024-10-15

## 2024-10-15 RX ORDER — PREDNISONE 20 MG/1
40 TABLET ORAL DAILY
Status: DISCONTINUED | OUTPATIENT
Start: 2024-10-16 | End: 2024-10-18

## 2024-10-15 RX ORDER — METHYLPREDNISOLONE SODIUM SUCCINATE 125 MG/2ML
125 INJECTION, POWDER, LYOPHILIZED, FOR SOLUTION INTRAMUSCULAR; INTRAVENOUS ONCE
Status: COMPLETED | OUTPATIENT
Start: 2024-10-15 | End: 2024-10-15

## 2024-10-15 RX ORDER — ALBUTEROL SULFATE 2.5 MG/.5ML
2.5 SOLUTION RESPIRATORY (INHALATION)
Status: DISCONTINUED | OUTPATIENT
Start: 2024-10-15 | End: 2024-10-19 | Stop reason: HOSPADM

## 2024-10-15 RX ORDER — IPRATROPIUM BROMIDE AND ALBUTEROL SULFATE 2.5; .5 MG/3ML; MG/3ML
1 SOLUTION RESPIRATORY (INHALATION)
Status: DISCONTINUED | OUTPATIENT
Start: 2024-10-16 | End: 2024-10-19

## 2024-10-15 RX ORDER — ONDANSETRON 2 MG/ML
4 INJECTION INTRAMUSCULAR; INTRAVENOUS EVERY 6 HOURS PRN
Status: DISCONTINUED | OUTPATIENT
Start: 2024-10-15 | End: 2024-10-19 | Stop reason: HOSPADM

## 2024-10-15 RX ORDER — 0.9 % SODIUM CHLORIDE 0.9 %
500 INTRAVENOUS SOLUTION INTRAVENOUS
Status: COMPLETED | OUTPATIENT
Start: 2024-10-15 | End: 2024-10-15

## 2024-10-15 RX ORDER — SODIUM CHLORIDE 9 MG/ML
INJECTION, SOLUTION INTRAVENOUS PRN
Status: DISCONTINUED | OUTPATIENT
Start: 2024-10-15 | End: 2024-10-19 | Stop reason: HOSPADM

## 2024-10-15 RX ORDER — IPRATROPIUM BROMIDE AND ALBUTEROL SULFATE 2.5; .5 MG/3ML; MG/3ML
1 SOLUTION RESPIRATORY (INHALATION)
Status: COMPLETED | OUTPATIENT
Start: 2024-10-15 | End: 2024-10-15

## 2024-10-15 RX ORDER — SODIUM CHLORIDE 0.9 % (FLUSH) 0.9 %
5-40 SYRINGE (ML) INJECTION EVERY 12 HOURS SCHEDULED
Status: DISCONTINUED | OUTPATIENT
Start: 2024-10-15 | End: 2024-10-19 | Stop reason: HOSPADM

## 2024-10-15 RX ORDER — ENOXAPARIN SODIUM 100 MG/ML
40 INJECTION SUBCUTANEOUS DAILY
Status: DISCONTINUED | OUTPATIENT
Start: 2024-10-16 | End: 2024-10-19 | Stop reason: HOSPADM

## 2024-10-15 RX ORDER — IOPAMIDOL 755 MG/ML
100 INJECTION, SOLUTION INTRAVASCULAR
Status: COMPLETED | OUTPATIENT
Start: 2024-10-15 | End: 2024-10-15

## 2024-10-15 RX ORDER — ACETAMINOPHEN 325 MG/1
650 TABLET ORAL EVERY 6 HOURS PRN
Status: DISCONTINUED | OUTPATIENT
Start: 2024-10-15 | End: 2024-10-19 | Stop reason: HOSPADM

## 2024-10-15 RX ADMIN — IOPAMIDOL 100 ML: 755 INJECTION, SOLUTION INTRAVENOUS at 20:32

## 2024-10-15 RX ADMIN — METHYLPREDNISOLONE SODIUM SUCCINATE 125 MG: 125 INJECTION INTRAMUSCULAR; INTRAVENOUS at 17:50

## 2024-10-15 RX ADMIN — ACETAMINOPHEN 650 MG: 325 TABLET ORAL at 17:45

## 2024-10-15 RX ADMIN — IPRATROPIUM BROMIDE AND ALBUTEROL SULFATE 1 DOSE: 2.5; .5 SOLUTION RESPIRATORY (INHALATION) at 18:48

## 2024-10-15 RX ADMIN — CEFTRIAXONE SODIUM 1000 MG: 1 INJECTION, POWDER, FOR SOLUTION INTRAMUSCULAR; INTRAVENOUS at 17:53

## 2024-10-15 RX ADMIN — AZITHROMYCIN DIHYDRATE 500 MG: 500 INJECTION, POWDER, LYOPHILIZED, FOR SOLUTION INTRAVENOUS at 18:17

## 2024-10-15 RX ADMIN — SODIUM CHLORIDE 500 ML: 9 INJECTION, SOLUTION INTRAVENOUS at 18:40

## 2024-10-15 ASSESSMENT — PAIN DESCRIPTION - LOCATION
LOCATION: ABDOMEN
LOCATION: ABDOMEN

## 2024-10-15 ASSESSMENT — PAIN SCALES - GENERAL
PAINLEVEL_OUTOF10: 6
PAINLEVEL_OUTOF10: 5

## 2024-10-15 ASSESSMENT — PAIN - FUNCTIONAL ASSESSMENT: PAIN_FUNCTIONAL_ASSESSMENT: 0-10

## 2024-10-15 ASSESSMENT — PAIN DESCRIPTION - DESCRIPTORS: DESCRIPTORS: DISCOMFORT

## 2024-10-15 NOTE — ED PROVIDER NOTES
[facility] EMERGENCY DEPT  EMERGENCY DEPARTMENT HISTORY AND PHYSICAL EXAM      Date: 10/15/2024  Patient Name: Eveline Raphael  MRN: [id number]  Birthdate 1950  Date of evaluation: 10/15/2024  Provider: Agustin Moya MD   Note Started: 5:27 PM EDT 10/15/24    HISTORY OF PRESENT ILLNESS     Chief Complaint   Patient presents with    Shortness of Breath    Cough    Nasal Congestion    Wheezing       History Provided By: Patient    HPI: Eveline Raphael is a 73 y.o. female with a tree of COPD, diabetes, hypertension, CKD presenting for shortness of breath.  Patient states for the past 4 days has been having cold symptoms including nasal congestion, rhinorrhea, cough.  Has now also been having worsening shortness of breath.  Last use her albuterol last night.  States that over the weekend she was taking Tylenol for fevers.  Denies any diarrhea, dysuria.    PAST MEDICAL HISTORY   Past Medical History:  Past Medical History:   Diagnosis Date    Bacterial skin infection of trunk 09/02/2020    Chronic kidney disease     Chronic obstructive pulmonary disease (HCC) 03/31/2022    Colon polyp     DM (diabetes mellitus) (HCC) 05/24/2011    Environmental allergies     HTN (hypertension) 05/24/2011    Hyperlipidemia 05/24/2011    Nausea & vomiting     Panic attacks     Psychiatric disorder     RLS (restless legs syndrome) 05/24/2011       Past Surgical History:  Past Surgical History:   Procedure Laterality Date    APPENDECTOMY      CHOLECYSTECTOMY      COLONOSCOPY N/A 1/23/2023    COLONOSCOPY performed by Elenita Landaverde MD at [facility] ENDOSCOPY    COLONOSCOPY  3/20/12    OK.  Rep 10 yrs. (Kev Patten)       Family History:  Family History   Problem Relation Age of Onset    Thyroid Disease Mother        Social History:  Social History     Tobacco Use    Smoking status: Never    Smokeless tobacco: Never   Vaping Use    Vaping status: Never Used   Substance Use Topics    Alcohol use: Not Currently    Drug use: Never  Already received Solu-Medrol.  Will ambulate afterwards to see if necessitates admission.  Temperature improved heart rate 127 [JS]   1957 Dr. Barger requests CTA for PE which is ordered. He will admit after CT. [LW]   2117 CT w RUL PNA.  No PE. [LW]      ED Course User Index  [JS] Agustin Moya MD  [LW] Nadira Sawyer MD       SEPSIS Reassessment: Sepsis Reassessment:    The patient meets sepsis criteria with a suspected source of Pneumonia/Respiratory  Cultures and antibiotics were initiated sequentially and appropriately as per orders.   Fluid resuscitation volume with 30ml/kg crystalloid bolus was: NOT INDICATED: the patient did NOT meet criteria for severe sepsis or septic shock OR had any hypotensive blood pressure readings due to sepsis at any point during their evaluation. 500ml of crystalloid was given instead..  I have performed a sepsis reassessment of the patient's clinical volume status and tissue perfusion at time: 1933 and the patient is adequately resuscitated..    Clinical Management Tools:  Not Applicable    Patient was given the following medications:  Medications   cefTRIAXone (ROCEPHIN) 1,000 mg in sterile water 10 mL IV syringe (has no administration in time range)     And   azithromycin (ZITHROMAX) 500 mg in sodium chloride 0.9 % 250 mL IVPB ([id number]) (has no administration in time range)       CONSULTS: See ED Course/MDM for further details.  None     Social Determinants affecting Diagnosis/Treatment: None    Smoking Cessation: Smoking Cessation Counseling  Discussed the risks of smoking tobacco products and the long term sequelae of tobacco use with the patient such as increased risk of heart attack, stroke, peripheral artery disease and cancer. The patient verbalized their understanding and were provided resources if asked. Counseled patient for approximately 3 minutes.     PROCEDURES   Unless otherwise noted above, none  Procedures      CRITICAL CARE TIME   CRITICAL CARE NOTE    levothyroxine 50 MCG tablet  Commonly known as: SYNTHROID  Take 1 tablet by mouth every morning     LORazepam 0.5 MG tablet  Commonly known as: ATIVAN     losartan-hydroCHLOROthiazide 100-25 MG per tablet  Commonly known as: HYZAAR  Take 1 tablet by mouth daily     meloxicam 7.5 MG tablet  Commonly known as: MOBIC  Take 1 tablet by mouth daily     metFORMIN 500 MG extended release tablet  Commonly known as: GLUCOPHAGE-XR  Take 1 tablet by mouth 2 times daily     * rOPINIRole 4 MG tablet  Commonly known as: REQUIP  TAKE 1 TABLET BY MOUTH TWICE  DAILY     * rOPINIRole 4 MG tablet  Commonly known as: REQUIP  Take 1 tablet by mouth 2 times daily     Trelegy Ellipta 100-62.5-25 MCG/ACT Aepb inhaler  Generic drug: fluticasone-umeclidin-vilant           * This list has 2 medication(s) that are the same as other medications prescribed for you. Read the directions carefully, and ask your doctor or other care provider to review them with you.                    DISCONTINUED MEDICATIONS:  Current Discharge Medication List          I am the Primary Clinician of Record. Agustin Moya MD (electronically signed)    (Please note that parts of this dictation were completed with voice recognition software. Quite often unanticipated grammatical, syntax, homophones, and other interpretive errors are inadvertently transcribed by the computer software. Please disregards these errors. Please excuse any errors that have escaped final proofreading.)     Agustin Moya MD  10/16/24 1443

## 2024-10-16 LAB
ANION GAP SERPL CALC-SCNC: 9 MMOL/L (ref 2–12)
APPEARANCE UR: CLEAR
BACTERIA URNS QL MICRO: NEGATIVE /HPF
BASOPHILS # BLD: 0.1 K/UL (ref 0–0.1)
BASOPHILS NFR BLD: 0 % (ref 0–1)
BILIRUB UR QL: NEGATIVE
BUN SERPL-MCNC: 16 MG/DL (ref 6–20)
BUN/CREAT SERPL: 19 (ref 12–20)
CA-I BLD-MCNC: 8.8 MG/DL (ref 8.5–10.1)
CHLORIDE SERPL-SCNC: 102 MMOL/L (ref 97–108)
CO2 SERPL-SCNC: 25 MMOL/L (ref 21–32)
COLOR UR: ABNORMAL
CREAT SERPL-MCNC: 0.85 MG/DL (ref 0.55–1.02)
DIFFERENTIAL METHOD BLD: ABNORMAL
EKG ATRIAL RATE: 138 BPM
EKG ATRIAL RATE: 84 BPM
EKG DIAGNOSIS: NORMAL
EKG DIAGNOSIS: NORMAL
EKG P AXIS: 12 DEGREES
EKG P-R INTERVAL: 186 MS
EKG Q-T INTERVAL: 356 MS
EKG Q-T INTERVAL: 404 MS
EKG QRS DURATION: 78 MS
EKG QRS DURATION: 84 MS
EKG QTC CALCULATION (BAZETT): 477 MS
EKG QTC CALCULATION (BAZETT): 535 MS
EKG R AXIS: -14 DEGREES
EKG R AXIS: 0 DEGREES
EKG T AXIS: 31 DEGREES
EKG T AXIS: 38 DEGREES
EKG VENTRICULAR RATE: 136 BPM
EKG VENTRICULAR RATE: 84 BPM
EOSINOPHIL # BLD: 0 K/UL (ref 0–0.4)
EOSINOPHIL NFR BLD: 0 % (ref 0–7)
EPITH CASTS URNS QL MICRO: ABNORMAL /LPF
ERYTHROCYTE [DISTWIDTH] IN BLOOD BY AUTOMATED COUNT: 13.2 % (ref 11.5–14.5)
EST. AVERAGE GLUCOSE BLD GHB EST-MCNC: 163 MG/DL
GLUCOSE BLD STRIP.AUTO-MCNC: 216 MG/DL (ref 65–100)
GLUCOSE BLD STRIP.AUTO-MCNC: 226 MG/DL (ref 65–100)
GLUCOSE BLD STRIP.AUTO-MCNC: 257 MG/DL (ref 65–100)
GLUCOSE BLD STRIP.AUTO-MCNC: 300 MG/DL (ref 65–100)
GLUCOSE BLD STRIP.AUTO-MCNC: 305 MG/DL (ref 65–100)
GLUCOSE SERPL-MCNC: 236 MG/DL (ref 65–100)
GLUCOSE UR STRIP.AUTO-MCNC: 150 MG/DL
HBA1C MFR BLD: 7.3 % (ref 4–5.6)
HCT VFR BLD AUTO: 37.6 % (ref 35–47)
HGB BLD-MCNC: 12.1 G/DL (ref 11.5–16)
HGB UR QL STRIP: ABNORMAL
IMM GRANULOCYTES # BLD AUTO: 0.1 K/UL (ref 0–0.04)
IMM GRANULOCYTES NFR BLD AUTO: 0 % (ref 0–0.5)
KETONES UR QL STRIP.AUTO: NEGATIVE MG/DL
LEUKOCYTE ESTERASE UR QL STRIP.AUTO: NEGATIVE
LYMPHOCYTES # BLD: 1.2 K/UL (ref 0.8–3.5)
LYMPHOCYTES NFR BLD: 8 % (ref 12–49)
MCH RBC QN AUTO: 26.9 PG (ref 26–34)
MCHC RBC AUTO-ENTMCNC: 32.2 G/DL (ref 30–36.5)
MCV RBC AUTO: 83.7 FL (ref 80–99)
MONOCYTES # BLD: 0.6 K/UL (ref 0–1)
MONOCYTES NFR BLD: 4 % (ref 5–13)
NEUTS SEG # BLD: 14.1 K/UL (ref 1.8–8)
NEUTS SEG NFR BLD: 88 % (ref 32–75)
NITRITE UR QL STRIP.AUTO: NEGATIVE
NRBC # BLD: 0 K/UL (ref 0–0.01)
NRBC BLD-RTO: 0 PER 100 WBC
PERFORMED BY:: ABNORMAL
PH UR STRIP: 5 (ref 5–8)
PLATELET # BLD AUTO: 218 K/UL (ref 150–400)
PMV BLD AUTO: 10.6 FL (ref 8.9–12.9)
POTASSIUM SERPL-SCNC: 3.5 MMOL/L (ref 3.5–5.1)
PROT UR STRIP-MCNC: NEGATIVE MG/DL
RBC # BLD AUTO: 4.49 M/UL (ref 3.8–5.2)
RBC #/AREA URNS HPF: ABNORMAL /HPF (ref 0–5)
SODIUM SERPL-SCNC: 136 MMOL/L (ref 136–145)
SP GR UR REFRACTOMETRY: >1.03 (ref 1–1.03)
URINE CULTURE IF INDICATED: ABNORMAL
UROBILINOGEN UR QL STRIP.AUTO: 0.1 EU/DL (ref 0.1–1)
WBC # BLD AUTO: 16.1 K/UL (ref 3.6–11)
WBC URNS QL MICRO: ABNORMAL /HPF (ref 0–4)

## 2024-10-16 PROCEDURE — 2580000003 HC RX 258: Performed by: HOSPITALIST

## 2024-10-16 PROCEDURE — 93005 ELECTROCARDIOGRAM TRACING: CPT | Performed by: INTERNAL MEDICINE

## 2024-10-16 PROCEDURE — 87899 AGENT NOS ASSAY W/OPTIC: CPT

## 2024-10-16 PROCEDURE — 94761 N-INVAS EAR/PLS OXIMETRY MLT: CPT

## 2024-10-16 PROCEDURE — 87449 NOS EACH ORGANISM AG IA: CPT

## 2024-10-16 PROCEDURE — 2580000003 HC RX 258: Performed by: INTERNAL MEDICINE

## 2024-10-16 PROCEDURE — 2700000000 HC OXYGEN THERAPY PER DAY

## 2024-10-16 PROCEDURE — 86738 MYCOPLASMA ANTIBODY: CPT

## 2024-10-16 PROCEDURE — 83036 HEMOGLOBIN GLYCOSYLATED A1C: CPT

## 2024-10-16 PROCEDURE — 1100000000 HC RM PRIVATE

## 2024-10-16 PROCEDURE — 6370000000 HC RX 637 (ALT 250 FOR IP): Performed by: HOSPITALIST

## 2024-10-16 PROCEDURE — 6370000000 HC RX 637 (ALT 250 FOR IP)

## 2024-10-16 PROCEDURE — 6360000002 HC RX W HCPCS: Performed by: INTERNAL MEDICINE

## 2024-10-16 PROCEDURE — 94640 AIRWAY INHALATION TREATMENT: CPT

## 2024-10-16 PROCEDURE — 80048 BASIC METABOLIC PNL TOTAL CA: CPT

## 2024-10-16 PROCEDURE — 36415 COLL VENOUS BLD VENIPUNCTURE: CPT

## 2024-10-16 PROCEDURE — 6360000002 HC RX W HCPCS: Performed by: HOSPITALIST

## 2024-10-16 PROCEDURE — 85025 COMPLETE CBC W/AUTO DIFF WBC: CPT

## 2024-10-16 PROCEDURE — 81001 URINALYSIS AUTO W/SCOPE: CPT

## 2024-10-16 PROCEDURE — 87070 CULTURE OTHR SPECIMN AEROBIC: CPT

## 2024-10-16 PROCEDURE — 87205 SMEAR GRAM STAIN: CPT

## 2024-10-16 PROCEDURE — 87147 CULTURE TYPE IMMUNOLOGIC: CPT

## 2024-10-16 PROCEDURE — 82962 GLUCOSE BLOOD TEST: CPT

## 2024-10-16 RX ORDER — AMLODIPINE BESYLATE 5 MG/1
10 TABLET ORAL DAILY
Status: DISCONTINUED | OUTPATIENT
Start: 2024-10-16 | End: 2024-10-19 | Stop reason: HOSPADM

## 2024-10-16 RX ORDER — ROPINIROLE 1 MG/1
4 TABLET, FILM COATED ORAL 2 TIMES DAILY
Status: DISCONTINUED | OUTPATIENT
Start: 2024-10-16 | End: 2024-10-19 | Stop reason: HOSPADM

## 2024-10-16 RX ORDER — LEVOTHYROXINE SODIUM 25 UG/1
50 TABLET ORAL EVERY MORNING
Status: DISCONTINUED | OUTPATIENT
Start: 2024-10-16 | End: 2024-10-19 | Stop reason: HOSPADM

## 2024-10-16 RX ORDER — INSULIN LISPRO 100 [IU]/ML
0-8 INJECTION, SOLUTION INTRAVENOUS; SUBCUTANEOUS
Status: DISCONTINUED | OUTPATIENT
Start: 2024-10-16 | End: 2024-10-19 | Stop reason: HOSPADM

## 2024-10-16 RX ORDER — LORAZEPAM 1 MG/1
1 TABLET ORAL EVERY 12 HOURS PRN
Status: DISCONTINUED | OUTPATIENT
Start: 2024-10-16 | End: 2024-10-16

## 2024-10-16 RX ORDER — GLUCAGON 1 MG/ML
1 KIT INJECTION PRN
Status: DISCONTINUED | OUTPATIENT
Start: 2024-10-16 | End: 2024-10-19 | Stop reason: HOSPADM

## 2024-10-16 RX ORDER — ASPIRIN 81 MG/1
81 TABLET ORAL DAILY
Status: DISCONTINUED | OUTPATIENT
Start: 2024-10-16 | End: 2024-10-19 | Stop reason: HOSPADM

## 2024-10-16 RX ORDER — BUDESONIDE AND FORMOTEROL FUMARATE DIHYDRATE 160; 4.5 UG/1; UG/1
2 AEROSOL RESPIRATORY (INHALATION)
Status: DISCONTINUED | OUTPATIENT
Start: 2024-10-16 | End: 2024-10-19 | Stop reason: HOSPADM

## 2024-10-16 RX ORDER — ATORVASTATIN CALCIUM 20 MG/1
20 TABLET, FILM COATED ORAL NIGHTLY
Status: DISCONTINUED | OUTPATIENT
Start: 2024-10-16 | End: 2024-10-19 | Stop reason: HOSPADM

## 2024-10-16 RX ORDER — BENZONATATE 100 MG/1
100 CAPSULE ORAL 3 TIMES DAILY PRN
Status: DISCONTINUED | OUTPATIENT
Start: 2024-10-16 | End: 2024-10-19 | Stop reason: HOSPADM

## 2024-10-16 RX ORDER — TRAMADOL HYDROCHLORIDE 50 MG/1
50 TABLET ORAL EVERY 6 HOURS PRN
Status: DISPENSED | OUTPATIENT
Start: 2024-10-16 | End: 2024-10-18

## 2024-10-16 RX ORDER — DEXTROSE MONOHYDRATE 100 MG/ML
INJECTION, SOLUTION INTRAVENOUS CONTINUOUS PRN
Status: DISCONTINUED | OUTPATIENT
Start: 2024-10-16 | End: 2024-10-19 | Stop reason: HOSPADM

## 2024-10-16 RX ORDER — GUAIFENESIN 600 MG/1
600 TABLET, EXTENDED RELEASE ORAL 2 TIMES DAILY
Status: DISCONTINUED | OUTPATIENT
Start: 2024-10-16 | End: 2024-10-19 | Stop reason: HOSPADM

## 2024-10-16 RX ORDER — MELOXICAM 7.5 MG/1
7.5 TABLET ORAL DAILY
Status: DISCONTINUED | OUTPATIENT
Start: 2024-10-16 | End: 2024-10-19 | Stop reason: HOSPADM

## 2024-10-16 RX ADMIN — TRAMADOL HYDROCHLORIDE 50 MG: 50 TABLET ORAL at 21:15

## 2024-10-16 RX ADMIN — IPRATROPIUM BROMIDE AND ALBUTEROL SULFATE 1 DOSE: .5; 3 SOLUTION RESPIRATORY (INHALATION) at 16:05

## 2024-10-16 RX ADMIN — BENZONATATE 100 MG: 100 CAPSULE ORAL at 11:51

## 2024-10-16 RX ADMIN — ENOXAPARIN SODIUM 40 MG: 100 INJECTION SUBCUTANEOUS at 10:00

## 2024-10-16 RX ADMIN — CEFTRIAXONE SODIUM 2000 MG: 2 INJECTION, POWDER, FOR SOLUTION INTRAMUSCULAR; INTRAVENOUS at 18:13

## 2024-10-16 RX ADMIN — MELOXICAM 7.5 MG: 7.5 TABLET ORAL at 09:55

## 2024-10-16 RX ADMIN — INSULIN LISPRO 2 UNITS: 100 INJECTION, SOLUTION INTRAVENOUS; SUBCUTANEOUS at 07:52

## 2024-10-16 RX ADMIN — ROPINIROLE HYDROCHLORIDE 4 MG: 1 TABLET, FILM COATED ORAL at 02:32

## 2024-10-16 RX ADMIN — ASPIRIN 81 MG: 81 TABLET, COATED ORAL at 09:54

## 2024-10-16 RX ADMIN — IPRATROPIUM BROMIDE AND ALBUTEROL SULFATE 1 DOSE: .5; 3 SOLUTION RESPIRATORY (INHALATION) at 03:42

## 2024-10-16 RX ADMIN — Medication 5 MG: at 21:10

## 2024-10-16 RX ADMIN — IPRATROPIUM BROMIDE AND ALBUTEROL SULFATE 1 DOSE: .5; 3 SOLUTION RESPIRATORY (INHALATION) at 20:11

## 2024-10-16 RX ADMIN — TRAMADOL HYDROCHLORIDE 50 MG: 50 TABLET ORAL at 11:51

## 2024-10-16 RX ADMIN — ATORVASTATIN CALCIUM 20 MG: 20 TABLET, FILM COATED ORAL at 21:10

## 2024-10-16 RX ADMIN — Medication 1 LOZENGE: at 13:30

## 2024-10-16 RX ADMIN — INSULIN LISPRO 6 UNITS: 100 INJECTION, SOLUTION INTRAVENOUS; SUBCUTANEOUS at 21:10

## 2024-10-16 RX ADMIN — LEVOTHYROXINE SODIUM 50 MCG: 0.03 TABLET ORAL at 10:01

## 2024-10-16 RX ADMIN — INSULIN LISPRO 6 UNITS: 100 INJECTION, SOLUTION INTRAVENOUS; SUBCUTANEOUS at 02:33

## 2024-10-16 RX ADMIN — Medication 2 PUFF: at 20:12

## 2024-10-16 RX ADMIN — ROPINIROLE HYDROCHLORIDE 4 MG: 1 TABLET, FILM COATED ORAL at 21:09

## 2024-10-16 RX ADMIN — Medication 1 LOZENGE: at 18:02

## 2024-10-16 RX ADMIN — ROPINIROLE HYDROCHLORIDE 4 MG: 1 TABLET, FILM COATED ORAL at 09:54

## 2024-10-16 RX ADMIN — GUAIFENESIN 600 MG: 600 TABLET, EXTENDED RELEASE ORAL at 21:10

## 2024-10-16 RX ADMIN — IPRATROPIUM BROMIDE AND ALBUTEROL SULFATE 1 DOSE: .5; 3 SOLUTION RESPIRATORY (INHALATION) at 11:20

## 2024-10-16 RX ADMIN — GUAIFENESIN 600 MG: 600 TABLET, EXTENDED RELEASE ORAL at 09:54

## 2024-10-16 RX ADMIN — INSULIN LISPRO 2 UNITS: 100 INJECTION, SOLUTION INTRAVENOUS; SUBCUTANEOUS at 13:29

## 2024-10-16 RX ADMIN — BENZONATATE 100 MG: 100 CAPSULE ORAL at 18:02

## 2024-10-16 RX ADMIN — AZITHROMYCIN MONOHYDRATE 500 MG: 500 INJECTION, POWDER, LYOPHILIZED, FOR SOLUTION INTRAVENOUS at 18:11

## 2024-10-16 RX ADMIN — SODIUM CHLORIDE, PRESERVATIVE FREE 10 ML: 5 INJECTION INTRAVENOUS at 21:11

## 2024-10-16 RX ADMIN — SODIUM CHLORIDE: 9 INJECTION, SOLUTION INTRAVENOUS at 18:08

## 2024-10-16 RX ADMIN — Medication 2 PUFF: at 08:47

## 2024-10-16 RX ADMIN — GUAIFENESIN 600 MG: 600 TABLET, EXTENDED RELEASE ORAL at 02:32

## 2024-10-16 RX ADMIN — SODIUM CHLORIDE, PRESERVATIVE FREE 10 ML: 5 INJECTION INTRAVENOUS at 00:21

## 2024-10-16 RX ADMIN — PREDNISONE 40 MG: 20 TABLET ORAL at 09:58

## 2024-10-16 RX ADMIN — SODIUM CHLORIDE, PRESERVATIVE FREE 10 ML: 5 INJECTION INTRAVENOUS at 10:01

## 2024-10-16 RX ADMIN — AMLODIPINE BESYLATE 10 MG: 5 TABLET ORAL at 09:54

## 2024-10-16 RX ADMIN — INSULIN LISPRO 4 UNITS: 100 INJECTION, SOLUTION INTRAVENOUS; SUBCUTANEOUS at 17:16

## 2024-10-16 ASSESSMENT — PAIN DESCRIPTION - LOCATION
LOCATION: CHEST
LOCATION: CHEST

## 2024-10-16 ASSESSMENT — PAIN DESCRIPTION - DESCRIPTORS
DESCRIPTORS: DISCOMFORT
DESCRIPTORS: SORE
DESCRIPTORS: PRESSURE

## 2024-10-16 ASSESSMENT — PAIN SCALES - GENERAL
PAINLEVEL_OUTOF10: 7
PAINLEVEL_OUTOF10: 6
PAINLEVEL_OUTOF10: 2
PAINLEVEL_OUTOF10: 5

## 2024-10-16 NOTE — ED NOTES
..ED TO INPATIENT SBAR HANDOFF    Patient Name: Eveline Raphael   Preferred Name: Eveline  : 1950  73 y.o.   Family/Caregiver Present: no   Code Status Order: Full Code  PO Status: NPO:No  Telemetry Order:   C-SSRS: Risk of Suicide: No Risk  Sitter no   Restraints:   no  Sepsis Risk Score     Situation  Chief Complaint   Patient presents with    Shortness of Breath    Cough    Nasal Congestion    Wheezing     Brief Description of Patient's Condition: A&O x 4. SOB on exertion. Congested, nonproductive cough. Denies pain. Ambulates to  with a steady gait.  Mental Status: oriented  Arrived from:Home  Imaging:   CTA CHEST W WO CONTRAST   Final Result   No pulmonary embolism   Right upper lobe pneumonia   Right lower lobe nodule is new as compared to the prior study. Recommend   follow-up after the acute infectious process has resolved to determine if this   is truly a nodule or represents a focus of bilobar pneumonia   Hepatic steatosis         Electronically signed by Elzbieta Dobbs      XR CHEST PORTABLE   Final Result      No acute process on portable chest.         Electronically signed by Markel Aguiar MD        Abnormal labs:   Abnormal Labs Reviewed   CBC WITH AUTO DIFFERENTIAL - Abnormal; Notable for the following components:       Result Value    WBC 14.1 (*)     Neutrophils Absolute 10.2 (*)     Monocytes Absolute 1.5 (*)     All other components within normal limits   COMPREHENSIVE METABOLIC PANEL - Abnormal; Notable for the following components:    Sodium 135 (*)     Chloride 95 (*)     Glucose 174 (*)     BUN/Creatinine Ratio 10 (*)     Globulin 4.4 (*)     Albumin/Globulin Ratio 0.8 (*)     All other components within normal limits       Background  Allergies: No Known Allergies  History:   Past Medical History:   Diagnosis Date    Bacterial skin infection of trunk 2020    Chronic kidney disease     Chronic obstructive pulmonary disease (HCC) 2022    Colon polyp     DM (diabetes  Comments: n/a  If any further questions, please call Sending RN at [phone]      Admitting Unit Notification  Name of person notified and time: Juana @ 4840      Electronically signed by: Electronically signed by Bambi Meeks RN on 10/15/2024 at 10:15 PM

## 2024-10-16 NOTE — PROGRESS NOTES
Hospitalist Progress Note    NAME:   Eveline Raphael   : 1950   MRN: [id number]     Subjective:   Daily Progress Note:  10/16/2024    Chief complaint:  Chief Complaint   Patient presents with    Shortness of Breath    Cough    Nasal Congestion    Wheezing         Hospital course to date/HPI from H&P:  73 y.o.  female with PMHx significant for COPD, hypertension, non-insulin-dependent diabetes mellitus type 2, hypothyroidism and restless leg syndrome who presents to the emergency department complaining of shortness of breath for the last several days.  In the emergency department she was found to have 4-4 SIRS criteria with concern of sepsis secondary to right upper lobe pneumonia.  Pulmonary embolism was considered and ruled out with a negative CTA chest.  Also concern of a COPD exacerbation.  She required 2 L of oxygen via nasal cannula when sleeping in the emergency department.  She will be treated with ceftriaxone and azithromycin, sputum culture will be obtained, blood cultures will be followed, PO prednisone, scheduled DuoNebs and as needed albuterol.       10/16-patient seen for the first time, chart was reviewed.  Case discussed with nursing staff.  Patient complaining of some cough and sore throat.  Still on nasal cannula oxygen.  Normally not on home oxygen.  Denies chest pain, nausea, vomiting.        Objective:     /72   Pulse 80   Temp 97.9 °F (36.6 °C) (Oral)   Resp 18   Ht 1.575 m (5' 2\")   Wt 76.5 kg (168 lb 11.2 oz)   SpO2 96%   BMI 30.86 kg/m²  O2 Flow Rate (L/min): 2 L/min      Temp (24hrs), Av.7 °F (37.1 °C), Min:97.5 °F (36.4 °C), Max:102.4 °F (39.1 °C)        PHYSICAL EXAM:  Gen WDWN  Neck Supple  CVS RRR  Resp Symmetric expansion  Abdomen soft, NT/ND  Ext moves all  Neuro Alert normal speech  Psych Normal Affect  Skin no visible Rash        Data Review:      Recent Labs     10/15/24  1730 10/16/24  0614   WBC 14.1* 16.1*   HGB 12.7 12.1   HCT 39.1 37.6

## 2024-10-16 NOTE — CONSULTS
Pulmonology and Critical Care Consult    Subjective:   Consult Note: 10/16/2024 3:45 PM    Chief Complaint:   Chief Complaint   Patient presents with    Shortness of Breath    Cough    Nasal Congestion    Wheezing        This patient has been seen and evaluated at the request of Dr. Vega.     Patient is a 73-year-old  female with a history of obesity, hypothyroidism, non-insulin-dependent type 2 diabetes mellitus, reported COPD, hypertension, and hyperlipidemia who presented to the hospital with increasing shortness of breath and was found to have right middle lobe pneumonia.  Patient seen and examined in her room on the floor this afternoon, case discussed in detail with the patient as well as the patient's son, with whom she lives.  States that when she presented with the above symptoms, she denied much in terms of wheezing.  Reportedly diagnosed with COPD in the outpatient setting, but has never seen a pulmonologist in the past.  Chronically on a Trelegy inhaler 1 puff daily.  Strongly recommend close outpatient pulmonary follow-up in our office with PFTs.  Negative influenza/COVID-19 testing, will send off an expanded infectious workup.  CTA chest on admission shows no significant adenopathy or pulmonary embolism, right middle lobe scattered infiltrates consistent with pneumonia, 0.5 cm right lower lobe nodule, and a right apical nodule measuring 0.5 cm.  Blood/urine cultures pending.  White blood cell count up to 16.1 from 14.1, sodium level improved to 136 today from 135 on admission, renal function stable, lactate normal, troponin negative x 1, LFTs normal.  Agree with Spiriva/Symbicort for now as well as prednisone 40 mg p.o. daily for 5 days.  Agree with IV Rocephin/azithromycin, repeat EKG today shows a normal QTc at 477.  Patient currently on 1 L nasal cannula, wean off for goal sats greater than 90% on room air.  Tmax on arrival was 102.4 °F, currently afebrile.  Recommend walking O2 test  _________________________________________________________________________________________  ========>>  M E D I C A L   A T T E N D I N G    F O L L O W  U P  N O T E  <<=========  -----------------------------------------------------------------------------------------------------    - Patient seen and examined by me approximately thirty minutes ago.  - In summary, patient is a 62y year old man who originally presented with anemia  - Patient today overall doing ok, comfortable, no SOB, palpitations CP. no bleeding reported  ==================>> MEDICATIONS <<====================    venlafaxine XR. 37.5 milliGRAM(s) Oral at bedtime  pantoprazole  Injectable 40 milliGRAM(s) IV Push two times a day  influenza   Vaccine 0.5 milliLiter(s) IntraMuscular once  ciprofloxacin     Tablet 500 milliGRAM(s) Oral every 12 hours    MEDICATIONS  (PRN):  ALPRAZolam 0.25 milliGRAM(s) Oral at bedtime PRN Anxiety    ==================>> REVIEW OF SYSTEM <<=================    GEN: no fever, no chills, no pain  RESP: no SOB, no cough, no sputum  CVS: no chest pain, no palpitations, no edema  GI: no abdominal pain, no nausea, no constipation, no diarrhea, no bleeding noted  : no dysuria, no frequency, no hematuria  Neuro: no headache, no dizziness  Derm : no itching, no rash    ==================>> VITAL SIGNS <<==================    Vital Signs Last 24 Hrs  T(C): 36.1 (09-21-17 @ 04:42)  T(F): 97 (09-21-17 @ 04:42), Max: 98 (09-21-17 @ 01:28)  HR: 63 (09-21-17 @ 04:42) (62 - 68)  BP: 109/58 (09-21-17 @ 04:42)  BP(mean): --  RR: 16 (09-21-17 @ 04:42) (16 - 18)  SpO2: 98% (09-21-17 @ 04:42) (96% - 100%)      ==================>> PHYSICAL EXAM <<=================    GEN: A&O X 3 , NAD , comfortable  HEENT: NCAT, PERRL, MMM, hearing intact  Neck: supple , no JVD  CVS: S1S2 , regular , No M/R/G appreciated  PULM: CTA B/L,  no W/R/R appreciated  ABD.: soft. non tender, non distended,  bowel sounds present  Extrem: intact pulses , no edema   PSYCH : normal mood,  not anxious     ==================>> LAB AND IMAGING <<==================                             8.5    5.73  )-----------( 279      ( 21 Sep 2017 06:02 )             28.3       Hemoglobin:   8.5  (09-21 @ 06:02)   Hemoglobin:   7.5  (09-20 @ 07:17)   Hemoglobin:   7.8  (09-19 @ 07:30)   Hemoglobin:   7.0  (09-19 @ 00:30)   Hemoglobin:   7.8  (09-18 @ 18:25)      09-21    142  |  106  |  20  ----------------------------<  135<H>  3.9   |  23  |  0.78    Ca    8.1<L>      21 Sep 2017 06:02    PATH PENDING    < from: Upper EUS (09.20.17 @ 15:37) >  Impression:          EGD:                -The esophagus was normal.                       -There was a hiatal hernia.                       -There was a 2 cm subepithelial gastric cardia lesion                        with a smooth surface.                       -The stomach was otherwise normal (apart from the                        gastric cardia lesion).                       -The duodenal bulb and D2 were normal.                       EUS: Exam performed with a radial and linear                        echoendoscope.                    -There was a 2 cm complex subepithelial lesion in the                        cardia. it was hypoechoic with a shadowing hyperechoic                        center. FNA was not performed as this is the likely                        source of the bleed and I did not want to induce                        bleeding and it will require surgical removal regardless.                       -There was a 2 cm hypoechoic oval mediastinal lymph node                        25 cm from the incisors. FNA was performed using a 22                        gauge [employer] needle. Onsite cytology was not present.  Recommendation:      - [facility] patient to [facility] for [facility] care.                       - Follow up cytology.   - Cipro 400 mg Iv x 3 days                       - Follow up thoracic surgery recommendations.  < end of copied text >    < from: Upper Endoscopy (09.19.17 @ 11:26) >  Findings:       The examined esophagus was mildly tortuous.       An 8 cm hiatus hernia with a few Patricio ulcers was found. The hiatal        narrowing was 45 cm from the incisors. The Z-line was 38 cm from the        incisors.       A medium-sized, submucosal and ulcerated, non-circumferential mass with        no bleeding and stigmata of recent bleeding was found at the        gastroesophageal junction. This was biopsied with a cold jumbo forceps        for histology. Estimated blood loss: none.       A few dispersed erosions were found in the gastric antrum. There were no        stigmata of recent bleeding.       The examined duodenum was normal.  < end of copied text >  ___________________________________________________________________________________  ===============>>  A S S E S S M E N T   A N D   P L A N <<===============  ------------------------------------------------------------------------------------------    · Assessment		  61 y/o Male  PMH PUD with hx of bleeding ulcers in the past, d/c'd 8 days ago from [place] after admission for anemia sent back to ED for generalized weakness, fatigue, exertional SOB. Pt was transfused 5U PRBC during recent admission. Had negative occult blood at that time and capsule endoscopy was decided on for further w/u. Turned in capsule 2 days ago but no results yet. Seen by GI and Hematology during recent admission, but refused bone marrow biopsy. H/H 8/18/17 12/7/40/8; H/H upon admission in ED 6.2/20.5. Not on blood thinners. Unclear source for blood loss/etiology of anemia, suspected to be GI in nature.   Occult stool + in the ER tonight,     Problem/Plan - 1:  ·  Problem: GI bleed, likely due to gastric mass found on EGD  GI f/u appreciated  EUS as above  Thoracic surgery appreciated: eventual surgical resection  monitor H/H closely: transfuse as needed  PPI  Cardio clearance appreciated: check Echo    Problem/Plan - 2:  ·  Problem: Anxiety  on Xanax PRN, Effexor     Problem/Plan - 3:  ·  Problem: HTN   Hold Losartan for Now, BP Stable.     Problem/Plan - 4:  ·  Problem: Back pain.  HX of Disc Herniation   on Tramadol PRN at home.     -GI/DVT Prophylaxis: PPI, ambulate    --------------------------------------------  Case discussed with pt, GI  Education given on plan of care  ___________________________  H. DORY Santos.  Pager: [phone] 34.0 PG    MCHC 32.2 30.0 - 36.5 g/dL    RDW 13.2 11.5 - 14.5 %    Platelets 218 150 - 400 K/uL    MPV 10.6 8.9 - 12.9 FL    Nucleated RBCs 0.0 0.0  WBC    nRBC 0.00 0.00 - 0.01 K/uL    Neutrophils % 88 (H) 32 - 75 %    Lymphocytes % 8 (L) 12 - 49 %    Monocytes % 4 (L) 5 - 13 %    Eosinophils % 0 0 - 7 %    Basophils % 0 0 - 1 %    Immature Granulocytes % 0 0 - 0.5 %    Neutrophils Absolute 14.1 (H) 1.8 - 8.0 K/UL    Lymphocytes Absolute 1.2 0.8 - 3.5 K/UL    Monocytes Absolute 0.6 0.0 - 1.0 K/UL    Eosinophils Absolute 0.0 0.0 - 0.4 K/UL    Basophils Absolute 0.1 0.0 - 0.1 K/UL    Immature Granulocytes Absolute 0.1 (H) 0.00 - 0.04 K/UL    Differential Type AUTOMATED     Basic Metabolic Panel    Collection Time: 10/16/24  6:14 AM   Result Value Ref Range    Sodium 136 136 - 145 mmol/L    Potassium 3.5 3.5 - 5.1 mmol/L    Chloride 102 97 - 108 mmol/L    CO2 25 21 - 32 mmol/L    Anion Gap 9 2 - 12 mmol/L    Glucose 236 (H) 65 - 100 mg/dL    BUN 16 6 - 20 mg/dL    Creatinine 0.85 0.55 - 1.02 mg/dL    BUN/Creatinine Ratio 19 12 - 20      Est, Glom Filt Rate 73 >60 ml/min/1.73m2    Calcium 8.8 8.5 - 10.1 mg/dL   POCT Glucose    Collection Time: 10/16/24  7:49 AM   Result Value Ref Range    POC Glucose 216 (H) 65 - 100 mg/dL    Performed by: Mayo Jo    EKG 12 Lead    Collection Time: 10/16/24 11:57 AM   Result Value Ref Range    Ventricular Rate 84 BPM    Atrial Rate 84 BPM    P-R Interval 186 ms    QRS Duration 84 ms    Q-T Interval 404 ms    QTc Calculation (Bazett) 477 ms    P Axis 12 degrees    R Axis 0 degrees    T Axis 31 degrees    Diagnosis       Normal sinus rhythm with sinus arrhythmia  Normal ECG  When compared with ECG of 15-OCT-2024 17:41,  Vent. rate has decreased BY  52 BPM  Nonspecific T wave abnormality, improved in Lateral leads  Confirmed by Lashay HEARD [facility] ([id number]) on 10/16/2024 3:40:52 PM     POCT Glucose    Collection Time: 10/16/24 12:18 PM   Result Value Ref Range    POC Glucose  226 (H) 65 - 100 mg/dL    Performed by: Mayo Jo        CTA chest (10/15/2024):    FINDINGS: This is a good quality study for the evaluation of pulmonary embolism  to the first subsegmental arterial level. There is no pulmonary embolism to this  level.        MEDIASTINUM: No mass or lymphadenopathy.  SREE: No mass or lymphadenopathy.  THORACIC AORTA: No aneurysm.  HEART: Normal in size.  ESOPHAGUS: No wall thickening or dilatation.  TRACHEA/BRONCHI: Patent.  PLEURA: No effusion or pneumothorax.  LUNGS: Patchy airspace disease in the right middle lobe. Right lower lobe 5 mm  nodule, new. Right apical 5 mm partially calcified nodule, stable  UPPER ABDOMEN: Partially imaged. No acute pathology. Decreased density of the  liver. Prior cholecystectomy.  BONES: No aggressive bone lesion or fracture.     IMPRESSION:  No pulmonary embolism  Right upper lobe pneumonia  Right lower lobe nodule is new as compared to the prior study. Recommend  follow-up after the acute infectious process has resolved to determine if this  is truly a nodule or represents a focus of bilobar pneumonia  Hepatic steatosis      Assessment:     Patient is a 73-year-old  female with a history of obesity, hypothyroidism, non-insulin-dependent type 2 diabetes mellitus, reported COPD, hypertension, and hyperlipidemia who presented to the hospital with increasing shortness of breath and was found to have right middle lobe pneumonia.    Plan:     1.)  Community-acquired pneumonia with acute hypoxemic respiratory failure  -Right middle lobe infiltrate noted on CT imaging  -Negative COVID-19/influenza testing, sending off expanded infectious workup  -I agree with IV Rocephin/azithromycin for total of 7 days, convert to oral antibiotics at discharge.  -Hopefully we will see an improvement in leukocytosis tomorrow.  -Currently on 1 L nasal cannula, wean off for goal sats greater than 90% on room air.  -Recommend walking O2 test

## 2024-10-16 NOTE — H&P
Hospitalist Admission Note    NAME:   Eveline Raphael   : 1950   MRN: [id number]     Date/Time: 10/15/2024 10:06 PM    Patient PCP: None, None    ______________________________________________________________________  Given the patient's current clinical presentation, I have a high level of concern for decompensation if discharged from the emergency department.  Complex decision making was performed, which includes reviewing the patient's available past medical records, laboratory results, and x-ray films.       My assessment of this patient's clinical condition and my plan of care is as follows.    Assessment / Plan:    Patient was seen and evaluated on 10/15/2024    73 y.o.  female with PMHx significant for COPD, hypertension, non-insulin-dependent diabetes mellitus type 2, hypothyroidism and restless leg syndrome who presents to the emergency department complaining of shortness of breath for the last several days.  In the emergency department she was found to have 4-4 SIRS criteria with concern of sepsis secondary to right upper lobe pneumonia.  Pulmonary embolism was considered and ruled out with a negative CTA chest.  Also concern of a COPD exacerbation.  She required 2 L of oxygen via nasal cannula when sleeping in the emergency department.  She will be treated with ceftriaxone and azithromycin, sputum culture will be obtained, blood cultures will be followed, PO prednisone, scheduled DuoNebs and as needed albuterol.    Acute hypoxic respiratory failure: Initially did not require oxygen in the emergency department but then would drop oxygen saturations into the mid 80s when sleeping.  Oxygen saturations are currently stable on 2 L of oxygen via nasal cannula.  This is likely multifactorial possibly from obstructive sleep apnea-undiagnosed, acute COPD exacerbation and sepsis secondary to right upper lobe pneumonia.  A pulmonary embolism was considered and ruled out with a negative CTA  PE, RUL pneumonia  Discussed case with: ED provider. After discussion I am in agreement that acuity of patient's medical condition necessitates hospital stay.      Code Status: Full  DVT Prophylaxis: Lovenox   Baseline: lives at home    Subjective:   CHIEF COMPLAINT: shortness of breath    HISTORY OF PRESENT ILLNESS:     Eveline Raphael is a 73 y.o.  female with PMHx significant for COPD, hypertension, non-insulin-dependent diabetes mellitus type 2, hypothyroidism and restless leg syndrome who presents to the emergency department complaining of shortness of breath for the last several days.  He states that the shortness of breath has been associated with a nonproductive cough, nasal congestion and subjective fever/chills.  She denies any sick contacts or known COVID-19 exposure.  She endorses wheezing associated with the shortness of breath.  She otherwise has no other complaints and simply denies chest pain, palpitations, abdominal pain, nausea, vomiting or lower extremity swelling.  She has no other complaints at this time.    The emergency department, she was initially found to be febrile with a temperature of 102.4 and tachycardic with a heart rate in the 140s.  Her respiratory rate was in the 20s.  Her blood pressure was stable and she was satting well on room air.  Her oxygen saturations dropped when sleeping and she required 2 L of oxygen via nasal cannula later during her emergency department stay.  CBC was significant for white blood cell count of 14.1 and a CMP was unremarkable.  Lactic acid was 1.7.  A CTA chest showed no pulmonary embolism but did show a right upper lobe pneumonia.  She was given a 500 cc fluid bolus, 125 mg IV Solu-Medrol, DuoNeb and ceftriaxone and azithromycin in the emergency department.  She will be placed on the hospitalist service to the telemetry floor.    We were asked to admit for work up and evaluation of the above problems.     Past Medical History:   Diagnosis Date

## 2024-10-16 NOTE — ED NOTES
Noted while patient sleeping, o2 sats dipped down to 88%. O2 @ 2L [place]. placed. Maintaining 93-94% on O2

## 2024-10-16 NOTE — CARE COORDINATION
10/16/24 0945   Service Assessment   Patient Orientation Alert and Oriented   Cognition Alert   History Provided By Patient   Primary Caregiver Self   Support Systems Children   Patient's Healthcare Decision Maker is: Legal Next of Kin   PCP Verified by CM Yes   Last Visit to PCP Within last 3 months   Prior Functional Level Independent in ADLs/IADLs   Current Functional Level Independent in ADLs/IADLs   Can patient return to prior living arrangement Yes   Ability to make needs known: Good   Family able to assist with home care needs: Yes   Would you like for me to discuss the discharge plan with any other family members/significant others, and if so, who? Yes  (Daughter, Isis and Son, Alexis)   Financial Resources Medicare   Community Resources None   Social/Functional History   Lives With Son  (Alexis)   Type of Home House   Home Layout One level   Home Equipment None   Services At/After Discharge   Transition of Care Consult (CM Consult) Discharge Planning   Confirm Follow Up Transport Family     0945: CM met with patient at bedside to complete DCP assessment. Patient wearing supplemental oxygen. Demos verified as accurate. Patient relays she and her son, Alexis live in a one story home with no steps. Prior to admission, patient relays being independent with ADLS and drives. No previous HH/IRF/SNF. No DME. Patient shared her PCP is Per Rosa in [place]. System updated. Preferred pharmacy for new medications verified as [facility] in [place]. Updated in system. When medically stable for discharge, patient states her son will provide transportation home. CM will continue to follow patient and recs of medical team.    Current Dispo: Home, No Needs.    Advance Care Planning     General Advance Care Planning (ACP) Conversation    Date of Conversation: 10/16/2024  Conducted with: Patient with Decision Making Capacity  Other persons present: None    Healthcare Decision Maker:   Primary Decision  Maker: ChristianoIsis - Child - [phone]       Content/Action Overview:  Has NO ACP documents-Information provided  Reviewed DNR/DNI and patient elects Full Code (Attempt Resuscitation)        Length of Voluntary ACP Conversation in minutes:  <16 minutes (Non-Billable)    CHIKIS PEREZ

## 2024-10-16 NOTE — PROGRESS NOTES
4 Eyes Skin Assessment     NAME:  Eveline Raphael  YOB: 1950  MEDICAL RECORD NUMBER:  [id number]    The patient is being assessed for  Admission    I agree that at least one RN has performed a thorough Head to Toe Skin Assessment on the patient. ALL assessment sites listed below have been assessed.      Areas assessed by both nurses:    Head, Face, Ears, Shoulders, Back, Chest, Arms, Elbows, Hands, Sacrum. Buttock, Coccyx, Ischium, Legs. Feet and Heels, and Under Medical Devices         Does the Patient have a Wound? No noted wound(s)       Felipe Prevention initiated by RN: Yes  Wound Care Orders initiated by RN: No    Pressure Injury (Stage 3,4, Unstageable, DTI, NWPT, and Complex wounds) if present, place Wound referral order by RN under : No    New Ostomies, if present place, Ostomy referral order under : No     Nurse 1 eSignature: Electronically signed by RAFAEL ROMAN RN on 10/16/24 at 12:52 AM EDT    **SHARE this note so that the co-signing nurse can place an eSignature**    Nurse 2 eSignature: Electronically signed by Cuong Sequeira RN on 10/16/24 at 1:20 AM EDT

## 2024-10-16 NOTE — PROGRESS NOTES
Received Order for Telemetry     Eveline Raphael   1950   [id number]   COPD with acute exacerbation (HCC) [J44.1]  Sepsis (HCC) [A41.9]  Fever, unspecified fever cause [R50.9]  Upper respiratory tract infection, unspecified type [J06.9]   No att. providers found     Tele Box # [id number] placed on patient at 0034 am  ER Room # FSED  Admitting to Room 425  Verified with Primary Nurse RAFAEL at  0034 am

## 2024-10-17 LAB
ALBUMIN SERPL-MCNC: 3.1 G/DL (ref 3.5–5)
ANION GAP SERPL CALC-SCNC: 5 MMOL/L (ref 2–12)
BASOPHILS # BLD: 0.1 K/UL (ref 0–0.1)
BASOPHILS NFR BLD: 0 % (ref 0–1)
BUN SERPL-MCNC: 19 MG/DL (ref 6–20)
BUN/CREAT SERPL: 23 (ref 12–20)
CA-I BLD-MCNC: 9.1 MG/DL (ref 8.5–10.1)
CHLORIDE SERPL-SCNC: 99 MMOL/L (ref 97–108)
CO2 SERPL-SCNC: 31 MMOL/L (ref 21–32)
CREAT SERPL-MCNC: 0.84 MG/DL (ref 0.55–1.02)
DIFFERENTIAL METHOD BLD: ABNORMAL
EOSINOPHIL # BLD: 0.1 K/UL (ref 0–0.4)
EOSINOPHIL NFR BLD: 0 % (ref 0–7)
ERYTHROCYTE [DISTWIDTH] IN BLOOD BY AUTOMATED COUNT: 13.2 % (ref 11.5–14.5)
GLUCOSE BLD STRIP.AUTO-MCNC: 150 MG/DL (ref 65–100)
GLUCOSE BLD STRIP.AUTO-MCNC: 174 MG/DL (ref 65–100)
GLUCOSE BLD STRIP.AUTO-MCNC: 213 MG/DL (ref 65–100)
GLUCOSE BLD STRIP.AUTO-MCNC: 254 MG/DL (ref 65–100)
GLUCOSE SERPL-MCNC: 144 MG/DL (ref 65–100)
HCT VFR BLD AUTO: 34.4 % (ref 35–47)
HGB BLD-MCNC: 11.2 G/DL (ref 11.5–16)
IMM GRANULOCYTES # BLD AUTO: 0.1 K/UL (ref 0–0.04)
IMM GRANULOCYTES NFR BLD AUTO: 1 % (ref 0–0.5)
LYMPHOCYTES # BLD: 2.9 K/UL (ref 0.8–3.5)
LYMPHOCYTES NFR BLD: 14 % (ref 12–49)
M PNEUMO IGM SER IA-ACNC: NONREACTIVE
MAGNESIUM SERPL-MCNC: 1.8 MG/DL (ref 1.6–2.4)
MCH RBC QN AUTO: 27.1 PG (ref 26–34)
MCHC RBC AUTO-ENTMCNC: 32.6 G/DL (ref 30–36.5)
MCV RBC AUTO: 83.3 FL (ref 80–99)
MONOCYTES # BLD: 1.6 K/UL (ref 0–1)
MONOCYTES NFR BLD: 8 % (ref 5–13)
NEUTS SEG # BLD: 15.4 K/UL (ref 1.8–8)
NEUTS SEG NFR BLD: 77 % (ref 32–75)
NRBC # BLD: 0 K/UL (ref 0–0.01)
NRBC BLD-RTO: 0 PER 100 WBC
PERFORMED BY:: ABNORMAL
PHOSPHATE SERPL-MCNC: 3.3 MG/DL (ref 2.6–4.7)
PLATELET # BLD AUTO: 274 K/UL (ref 150–400)
PMV BLD AUTO: 10.9 FL (ref 8.9–12.9)
POTASSIUM SERPL-SCNC: 3.4 MMOL/L (ref 3.5–5.1)
RBC # BLD AUTO: 4.13 M/UL (ref 3.8–5.2)
SODIUM SERPL-SCNC: 135 MMOL/L (ref 136–145)
WBC # BLD AUTO: 20.1 K/UL (ref 3.6–11)

## 2024-10-17 PROCEDURE — 83735 ASSAY OF MAGNESIUM: CPT

## 2024-10-17 PROCEDURE — 6370000000 HC RX 637 (ALT 250 FOR IP): Performed by: HOSPITALIST

## 2024-10-17 PROCEDURE — 1100000000 HC RM PRIVATE

## 2024-10-17 PROCEDURE — 6360000002 HC RX W HCPCS: Performed by: INTERNAL MEDICINE

## 2024-10-17 PROCEDURE — 6370000000 HC RX 637 (ALT 250 FOR IP)

## 2024-10-17 PROCEDURE — 94640 AIRWAY INHALATION TREATMENT: CPT

## 2024-10-17 PROCEDURE — 80069 RENAL FUNCTION PANEL: CPT

## 2024-10-17 PROCEDURE — 2580000003 HC RX 258: Performed by: INTERNAL MEDICINE

## 2024-10-17 PROCEDURE — 82962 GLUCOSE BLOOD TEST: CPT

## 2024-10-17 PROCEDURE — 2580000003 HC RX 258: Performed by: HOSPITALIST

## 2024-10-17 PROCEDURE — 2700000000 HC OXYGEN THERAPY PER DAY

## 2024-10-17 PROCEDURE — 6360000002 HC RX W HCPCS: Performed by: HOSPITALIST

## 2024-10-17 PROCEDURE — 85025 COMPLETE CBC W/AUTO DIFF WBC: CPT

## 2024-10-17 PROCEDURE — 94761 N-INVAS EAR/PLS OXIMETRY MLT: CPT

## 2024-10-17 PROCEDURE — 36415 COLL VENOUS BLD VENIPUNCTURE: CPT

## 2024-10-17 RX ORDER — POTASSIUM CHLORIDE 1500 MG/1
40 TABLET, EXTENDED RELEASE ORAL ONCE
Status: COMPLETED | OUTPATIENT
Start: 2024-10-17 | End: 2024-10-17

## 2024-10-17 RX ORDER — LANOLIN ALCOHOL/MO/W.PET/CERES
400 CREAM (GRAM) TOPICAL 2 TIMES DAILY
Status: DISCONTINUED | OUTPATIENT
Start: 2024-10-17 | End: 2024-10-19 | Stop reason: HOSPADM

## 2024-10-17 RX ADMIN — Medication 2 PUFF: at 19:49

## 2024-10-17 RX ADMIN — BENZONATATE 100 MG: 100 CAPSULE ORAL at 01:04

## 2024-10-17 RX ADMIN — Medication 400 MG: at 10:32

## 2024-10-17 RX ADMIN — GUAIFENESIN 600 MG: 600 TABLET, EXTENDED RELEASE ORAL at 08:03

## 2024-10-17 RX ADMIN — AMLODIPINE BESYLATE 10 MG: 5 TABLET ORAL at 08:03

## 2024-10-17 RX ADMIN — ENOXAPARIN SODIUM 40 MG: 100 INJECTION SUBCUTANEOUS at 08:04

## 2024-10-17 RX ADMIN — ROPINIROLE HYDROCHLORIDE 4 MG: 1 TABLET, FILM COATED ORAL at 21:06

## 2024-10-17 RX ADMIN — IPRATROPIUM BROMIDE AND ALBUTEROL SULFATE 1 DOSE: .5; 3 SOLUTION RESPIRATORY (INHALATION) at 04:16

## 2024-10-17 RX ADMIN — BENZONATATE 100 MG: 100 CAPSULE ORAL at 21:15

## 2024-10-17 RX ADMIN — SODIUM CHLORIDE, PRESERVATIVE FREE 10 ML: 5 INJECTION INTRAVENOUS at 08:04

## 2024-10-17 RX ADMIN — IPRATROPIUM BROMIDE AND ALBUTEROL SULFATE 1 DOSE: .5; 3 SOLUTION RESPIRATORY (INHALATION) at 00:02

## 2024-10-17 RX ADMIN — POTASSIUM CHLORIDE 40 MEQ: 1500 TABLET, EXTENDED RELEASE ORAL at 10:32

## 2024-10-17 RX ADMIN — SODIUM CHLORIDE, PRESERVATIVE FREE 10 ML: 5 INJECTION INTRAVENOUS at 21:05

## 2024-10-17 RX ADMIN — INSULIN LISPRO 4 UNITS: 100 INJECTION, SOLUTION INTRAVENOUS; SUBCUTANEOUS at 17:04

## 2024-10-17 RX ADMIN — LEVOTHYROXINE SODIUM 50 MCG: 0.03 TABLET ORAL at 08:03

## 2024-10-17 RX ADMIN — ASPIRIN 81 MG: 81 TABLET, COATED ORAL at 08:03

## 2024-10-17 RX ADMIN — IPRATROPIUM BROMIDE AND ALBUTEROL SULFATE 1 DOSE: .5; 3 SOLUTION RESPIRATORY (INHALATION) at 19:49

## 2024-10-17 RX ADMIN — Medication 2 PUFF: at 08:46

## 2024-10-17 RX ADMIN — IPRATROPIUM BROMIDE AND ALBUTEROL SULFATE 1 DOSE: .5; 3 SOLUTION RESPIRATORY (INHALATION) at 23:58

## 2024-10-17 RX ADMIN — ROPINIROLE HYDROCHLORIDE 4 MG: 1 TABLET, FILM COATED ORAL at 08:03

## 2024-10-17 RX ADMIN — BENZONATATE 100 MG: 100 CAPSULE ORAL at 08:03

## 2024-10-17 RX ADMIN — GUAIFENESIN 600 MG: 600 TABLET, EXTENDED RELEASE ORAL at 21:06

## 2024-10-17 RX ADMIN — CEFTRIAXONE SODIUM 2000 MG: 2 INJECTION, POWDER, FOR SOLUTION INTRAMUSCULAR; INTRAVENOUS at 17:05

## 2024-10-17 RX ADMIN — Medication 400 MG: at 21:06

## 2024-10-17 RX ADMIN — IPRATROPIUM BROMIDE AND ALBUTEROL SULFATE 1 DOSE: .5; 3 SOLUTION RESPIRATORY (INHALATION) at 11:22

## 2024-10-17 RX ADMIN — Medication 1 LOZENGE: at 11:16

## 2024-10-17 RX ADMIN — INSULIN LISPRO 2 UNITS: 100 INJECTION, SOLUTION INTRAVENOUS; SUBCUTANEOUS at 12:31

## 2024-10-17 RX ADMIN — PREDNISONE 40 MG: 20 TABLET ORAL at 08:03

## 2024-10-17 RX ADMIN — IPRATROPIUM BROMIDE AND ALBUTEROL SULFATE 1 DOSE: .5; 3 SOLUTION RESPIRATORY (INHALATION) at 08:46

## 2024-10-17 RX ADMIN — MELOXICAM 7.5 MG: 7.5 TABLET ORAL at 08:03

## 2024-10-17 RX ADMIN — TRAMADOL HYDROCHLORIDE 50 MG: 50 TABLET ORAL at 21:06

## 2024-10-17 RX ADMIN — IPRATROPIUM BROMIDE AND ALBUTEROL SULFATE 1 DOSE: .5; 3 SOLUTION RESPIRATORY (INHALATION) at 15:15

## 2024-10-17 RX ADMIN — AZITHROMYCIN MONOHYDRATE 500 MG: 500 INJECTION, POWDER, LYOPHILIZED, FOR SOLUTION INTRAVENOUS at 17:05

## 2024-10-17 RX ADMIN — ATORVASTATIN CALCIUM 20 MG: 20 TABLET, FILM COATED ORAL at 21:11

## 2024-10-17 NOTE — PROGRESS NOTES
Comprehensive Nutrition Assessment    Type and Reason for Visit:  Initial (MST2)    Nutrition Recommendations/Plan:   Add Diabetic ONS Glucerna daily to help pt meet nutritional needs and prevent further lean body mass loss  Encourage intakes >50%  Monitor weight, labs, BG levels, and bowel fxn     Malnutrition Assessment:  Malnutrition Status:  Severe malnutrition (10/17/24 1152)    Context:  Acute Illness     Findings of the 6 clinical characteristics of malnutrition:  Energy Intake:  50% or less of estimated energy requirements for 5 or more days  Weight Loss:  Greater than 2% over 1 week     Body Fat Loss:  Unable to assess     Muscle Mass Loss:  Unable to assess    Fluid Accumulation:  Unable to assess     Strength:  Not Performed    Nutrition Assessment:    Presented w/ SOB. w/u signifcant for fever 102.4, tachycardia, chest x ray w/ R upper lobe pneumonia, O2 sat dropping in sleep, req 2L NC. Pt reports no known weight loss until the last week when she began having symptoms; now weighing 73.4 kg (-8%). Po itnakes <50% of usual per pt d/t SOB, reduced appetite. Pt amenable to ONS while here, requesting Glucerna. Pt now on 4CHO, Cardiac diet. Labs and meds reviewed. K 3.4, , on 40 mg prednisone.    Nutrition Related Findings:    NFPE deferred. No reports of n/v/d/c or problems chewing/swallowing. LBM 10/16 Wound Type: None       Current Nutrition Intake & Therapies:    Average Meal Intake: 26-50%, 1-25%  Average Supplements Intake: None Ordered  ADULT DIET; Regular; 4 carb choices (60 gm/meal); Low Fat/Low Chol/High Fiber/2 gm Na    Anthropometric Measures:  Height: 157.5 cm (5' 2\")  Ideal Body Weight (IBW): 110 lbs (50 kg)    Current Body Weight: 73.4 kg (161 lb 13.1 oz),   IBW. Weight Source: Bed Scale  Current BMI (kg/m2): 29.6  Usual Body Weight: 79.4 kg (175 lb)  % Weight Change (Calculated): -7.5  Weight Adjustment For: No Adjustment  BMI Categories: Normal Weight (BMI 18.5-24.9)    Estimated  Daily Nutrient Needs:  Energy Requirements Based On: Formula  Weight Used for Energy Requirements: Current  Energy (kcal/day): 1805 kcals (MSJ, AF1.2, SF1.3)  Weight Used for Protein Requirements: Current  Protein (g/day): 73-95 (1-1.3 g/kg)  Method Used for Fluid Requirements: 1 ml/kcal  Fluid (ml/day): 1805 mL    Nutrition Diagnosis:   Severe malnutrition related to inadequate protein-energy intake as evidenced by weight loss greater than or equal to 2% in 1 week, poor intake prior to admission    Nutrition Interventions:   Food and/or Nutrient Delivery: Continue Current Diet, Start Oral Nutrition Supplement  Nutrition Education/Counseling: No recommendation at this time  Coordination of Nutrition Care: Continue to monitor while inpatient  Plan of Care discussed with: pt    Goals:  Goals: by next RD assessment, Meet at least 75% of estimated needs     Nutrition Monitoring and Evaluation:   Behavioral-Environmental Outcomes: None Identified  Food/Nutrient Intake Outcomes: Food and Nutrient Intake, Supplement Intake  Physical Signs/Symptoms Outcomes: Meal Time Behavior, Weight, Biochemical Data, Nutrition Focused Physical Findings    Discharge Planning:    Too soon to determine     Tamia Price RD  Contact: [phone]

## 2024-10-17 NOTE — PROGRESS NOTES
Spo2 on 2lpm at rest 95%  Spo2 on room air at rest 91%  Spo2 on room air with ambulation 87%  Spo2 on 2lpm with ambulation 92%

## 2024-10-17 NOTE — PROGRESS NOTES
Pulmonology Progress Note    Subjective:     Chief Complaint:   Chief Complaint   Patient presents with    Shortness of Breath    Cough    Nasal Congestion    Wheezing        Patient seen and examined in her room on the floor this afternoon, no acute events overnight.  Still on 2 L nasal cannula, completed a walking O2 test today and was 91% on room air at rest, 87% on room air with ambulation, requiring 2 L nasal cannula with ambulation in order to maintain a sat of 92%.  White blood cell count up to 20.1 from 16.1 yesterday, no fevers, sputum culture pending.  Mycoplasma/urinary antigens pending.  Sodium 135, potassium 3.4 and the patient received 40 mEq oral KCl today.  No change to bronchodilators, antibiotics, or systemic steroids for now.  Recommend repeat walking O2 test tomorrow.  Likely will need home O2 at discharge.           Current Facility-Administered Medications   Medication Dose Route Frequency Provider Last Rate Last Admin    magnesium oxide (MAG-OX) tablet 400 mg  400 mg Oral BID Wilson Vega MD   400 mg at 10/17/24 1032    amLODIPine (NORVASC) tablet 10 mg  10 mg Oral Daily Andrew Barger MD   10 mg at 10/17/24 0803    aspirin EC tablet 81 mg  81 mg Oral Daily Andrew Barger MD   81 mg at 10/17/24 0803    atorvastatin (LIPITOR) tablet 20 mg  20 mg Oral Nightly Andrew Barger MD   20 mg at 10/16/24 2110    budesonide-formoterol (SYMBICORT) 160-4.5 MCG/ACT inhaler 2 puff  2 puff Inhalation BID RT Andrew Barger MD   2 puff at 10/17/24 0846    And    tiotropium (SPIRIVA RESPIMAT) 2.5 MCG/ACT inhaler 2 puff  2 puff Inhalation Daily RT Andrew Barger MD   2 puff at 10/16/24 0847    levothyroxine (SYNTHROID) tablet 50 mcg  50 mcg Oral QAM Andrew Barger MD   50 mcg at 10/17/24 0803    meloxicam (MOBIC) tablet 7.5 mg  7.5 mg Oral Daily Andrew Barger MD   7.5 mg at 10/17/24 0803    rOPINIRole (REQUIP) tablet 4 mg  4 mg Oral BID Andrew Barger MD   4 mg at 10/17/24 0803    glucose  hydroxide-simethicone (MAALOX) 200-200-20 MG/5ML suspension 30 mL  30 mL Oral Q6H PRN Andrew Barger MD        ondansetron (ZOFRAN) injection 4 mg  4 mg IntraVENous Q6H PRN Andrew Barger MD        melatonin tablet 5 mg  5 mg Oral Nightly PRN Andrew Barger MD   5 mg at 10/16/24 2110    predniSONE (DELTASONE) tablet 40 mg  40 mg Oral Daily Andrew Barger MD   40 mg at 10/17/24 0803    ipratropium 0.5 mg-albuterol 2.5 mg (DUONEB) nebulizer solution 1 Dose  1 Dose Inhalation Q4H RT Andrew Barger MD   1 Dose at 10/17/24 1122    albuterol (PROVENTIL) nebulizer solution 2.5 mg  2.5 mg Nebulization Q2H PRN Andrew Barger MD              No Known Allergies    Review of Systems:  Pertinent items are noted in HPI.    Objective:     Blood pressure (!) 142/84, pulse 89, temperature 98.6 °F (37 °C), temperature source Oral, resp. rate 16, height 1.575 m (5' 2\"), weight 73.4 kg (161 lb 13.1 oz), SpO2 95%. Temp (24hrs), Av.9 °F (36.6 °C), Min:97.5 °F (36.4 °C), Max:98.6 °F (37 °C)      Intake and Output:  Current Shift: No intake/output data recorded.  Last 3 Shifts: 10/15 1901 - 10/17 0700  In: 843 [P.O.:830; I.V.:13]  Out: 120 [Urine:120]    Physical Exam:   General appearance: alert, appears stated age, cooperative, and mildly obese  Head: Normocephalic, without obvious abnormality, atraumatic  Eyes: negative  Neck: no adenopathy and supple, symmetrical, trachea midline  Lungs:  Prolonged exhalation phase without significant wheezing, mild crackles in the right midlung area, currently on 2 L nasal cannula.  Heart: regular rate and rhythm, S1, S2 normal, no murmur, click, rub or gallop  Abdomen: soft, non-tender; bowel sounds normal; no masses,  no organomegaly  Extremities: extremities normal, atraumatic, no cyanosis or edema  Pulses: 2+ and symmetric  Skin: Skin color, texture, turgor normal. No rashes or lesions  Lymph nodes: Cervical, supraclavicular, and axillary nodes normal.  Neurologic: Grossly

## 2024-10-17 NOTE — PLAN OF CARE
Problem: Chronic Conditions and Co-morbidities  Goal: Patient's chronic conditions and co-morbidity symptoms are monitored and maintained or improved  10/16/2024 2100 by Jessica Daily RN  Outcome: Progressing  10/16/2024 1850 by Deysi Huber RN  Outcome: Progressing     Problem: Discharge Planning  Goal: Discharge to home or other facility with appropriate resources  10/16/2024 2100 by Jessica Daily RN  Outcome: Progressing  10/16/2024 1850 by Deysi Huber RN  Outcome: Progressing     Problem: Pain  Goal: Verbalizes/displays adequate comfort level or baseline comfort level  10/16/2024 2100 by Jessica Daily RN  Outcome: Progressing  10/16/2024 1850 by Deysi Huber RN  Outcome: Progressing

## 2024-10-17 NOTE — CARE COORDINATION
0750: Chart reviewed.    Per notes; patient on supplemental oxygen and IV ABX.    Current Dispo: Home, No Needs.    CM will continue to follow patient and recs of medical team.    1145: Walking pulse ox test noted.    Patient qualifies for oxygen.    1405: Per pulmonology, repeat walking pulse ox tomorrow, 10/17.

## 2024-10-17 NOTE — PROGRESS NOTES
Hospitalist Progress Note    NAME:   Eveline Raphael   : 1950   MRN: [id number]     Subjective:   Daily Progress Note:  10/17/2024    Chief complaint:  Chief Complaint   Patient presents with    Shortness of Breath    Cough    Nasal Congestion    Wheezing         Hospital course to date/HPI from H&P:  73 y.o.  female with PMHx significant for COPD, hypertension, non-insulin-dependent diabetes mellitus type 2, hypothyroidism and restless leg syndrome who presents to the emergency department complaining of shortness of breath for the last several days.  In the emergency department she was found to have 4-4 SIRS criteria with concern of sepsis secondary to right upper lobe pneumonia.  Pulmonary embolism was considered and ruled out with a negative CTA chest.  Also concern of a COPD exacerbation.  She required 2 L of oxygen via nasal cannula when sleeping in the emergency department.  She will be treated with ceftriaxone and azithromycin, sputum culture will be obtained, blood cultures will be followed, PO prednisone, scheduled DuoNebs and as needed albuterol.       10/16-patient seen for the first time, chart was reviewed.  Case discussed with nursing staff.  Patient complaining of some cough and sore throat.  Still on nasal cannula oxygen.  Normally not on home oxygen.  Denies chest pain, nausea, vomiting.    10/17/24-patient seen and examined, case discussed with nursing staff.  Patient still short of breath needing nasal cannula oxygen.  Appreciate pulmonary input.  Advised patient may be discharged if cleared by pulmonary but patient not ready to go home yet today.  Patient feels she needs another day to clinically improve.  Walking pulse oximetry ordered for today.  Patient is reluctant to go home on home oxygen.        Objective:     BP (!) 142/84   Pulse 80   Temp 98.6 °F (37 °C) (Oral)   Resp 20   Ht 1.575 m (5' 2\")   Wt 76.3 kg (168 lb 3.4 oz)   SpO2 93%   BMI 30.77 kg/m²  O2 Flow Rate  2.5 mg  1 Dose Inhalation Q4H RT     Continuous Infusions:   dextrose      sodium chloride 5 mL/hr at 10/16/24 1808     PRN Meds:.glucose, dextrose bolus **OR** dextrose bolus, glucagon (rDNA), dextrose, Benzocaine-Menthol, traMADol, benzonatate, sodium chloride flush, sodium chloride, acetaminophen **OR** acetaminophen, aluminum & magnesium hydroxide-simethicone, ondansetron, melatonin, albuterol      CTA CHEST W WO CONTRAST  Result Date: 10/15/2024  No pulmonary embolism Right upper lobe pneumonia Right lower lobe nodule is new as compared to the prior study. Recommend follow-up after the acute infectious process has resolved to determine if this is truly a nodule or represents a focus of bilobar pneumonia Hepatic steatosis Electronically signed by Elzbieta Dobbs    XR CHEST PORTABLE    Result Date: 10/15/2024  EXAM:  XR CHEST PORTABLE INDICATION: Sepsis COMPARISON: October 2023 TECHNIQUE: portable chest AP view FINDINGS: The cardiac silhouette is within normal limits. The pulmonary vasculature is within normal limits. The lungs and pleural spaces are clear. The visualized bones and upper abdomen are age-appropriate.     No acute process on portable chest. Electronically signed by Markel Aguiar MD        Assessment/Plan:     Suspected sepsis from right lung pneumonia-continue IV antibiotics, cultures NGTD, monitor clinical response.  Appreciate pulmonary input.    Acute COPD exacerbation-continue steroids, nebs, monitor clinical response slow improvement.    Acute hypoxic respiratory failure-from above continue to wean oxygen, check walking pulse oximetry today for home oxygen needs.  Follow pulmonary recommendation    Right lung nodule pulmonary consult requested likely would need repeat CT chest within 1 year per pulmonary note    HTN monitor BP on home    DMT2 uncontrolled with hyper glycemia from steroid monitor adjust BG accordingly    RLS on requip     Hypokalemia-replete K       Discussion/MDM:-  Patient with

## 2024-10-18 LAB
ALBUMIN SERPL-MCNC: 2.9 G/DL (ref 3.5–5)
ANION GAP SERPL CALC-SCNC: 3 MMOL/L (ref 2–12)
BACTERIA SPEC CULT: NORMAL
BACTERIA SPEC CULT: NORMAL
BASOPHILS # BLD: 0 K/UL (ref 0–0.1)
BASOPHILS NFR BLD: 0 % (ref 0–1)
BUN SERPL-MCNC: 16 MG/DL (ref 6–20)
BUN/CREAT SERPL: 23 (ref 12–20)
CA-I BLD-MCNC: 8.8 MG/DL (ref 8.5–10.1)
CHLORIDE SERPL-SCNC: 104 MMOL/L (ref 97–108)
CO2 SERPL-SCNC: 32 MMOL/L (ref 21–32)
CREAT SERPL-MCNC: 0.7 MG/DL (ref 0.55–1.02)
CRP SERPL-MCNC: 2.86 MG/DL (ref 0–0.3)
DIFFERENTIAL METHOD BLD: ABNORMAL
EOSINOPHIL # BLD: 0 K/UL (ref 0–0.4)
EOSINOPHIL NFR BLD: 0 % (ref 0–7)
ERYTHROCYTE [DISTWIDTH] IN BLOOD BY AUTOMATED COUNT: 13.1 % (ref 11.5–14.5)
GLUCOSE BLD STRIP.AUTO-MCNC: 132 MG/DL (ref 65–100)
GLUCOSE BLD STRIP.AUTO-MCNC: 188 MG/DL (ref 65–100)
GLUCOSE BLD STRIP.AUTO-MCNC: 222 MG/DL (ref 65–100)
GLUCOSE BLD STRIP.AUTO-MCNC: 286 MG/DL (ref 65–100)
GLUCOSE SERPL-MCNC: 115 MG/DL (ref 65–100)
GRAM STN SPEC: NORMAL
HCT VFR BLD AUTO: 31.3 % (ref 35–47)
HGB BLD-MCNC: 10.2 G/DL (ref 11.5–16)
IMM GRANULOCYTES # BLD AUTO: 0.3 K/UL (ref 0–0.04)
IMM GRANULOCYTES NFR BLD AUTO: 2 % (ref 0–0.5)
LYMPHOCYTES # BLD: 3.9 K/UL (ref 0.8–3.5)
LYMPHOCYTES NFR BLD: 25 % (ref 12–49)
Lab: NORMAL
MAGNESIUM SERPL-MCNC: 2 MG/DL (ref 1.6–2.4)
MCH RBC QN AUTO: 27.3 PG (ref 26–34)
MCHC RBC AUTO-ENTMCNC: 32.6 G/DL (ref 30–36.5)
MCV RBC AUTO: 83.7 FL (ref 80–99)
MONOCYTES # BLD: 1.4 K/UL (ref 0–1)
MONOCYTES NFR BLD: 9 % (ref 5–13)
NEUTS SEG # BLD: 10.1 K/UL (ref 1.8–8)
NEUTS SEG NFR BLD: 64 % (ref 32–75)
NRBC # BLD: 0 K/UL (ref 0–0.01)
NRBC BLD-RTO: 0 PER 100 WBC
PERFORMED BY:: ABNORMAL
PHOSPHATE SERPL-MCNC: 2.4 MG/DL (ref 2.6–4.7)
PLATELET # BLD AUTO: 281 K/UL (ref 150–400)
PMV BLD AUTO: 11 FL (ref 8.9–12.9)
POTASSIUM SERPL-SCNC: 3.6 MMOL/L (ref 3.5–5.1)
PROCALCITONIN SERPL-MCNC: 0.18 NG/ML
RBC # BLD AUTO: 3.74 M/UL (ref 3.8–5.2)
RBC MORPH BLD: ABNORMAL
SODIUM SERPL-SCNC: 139 MMOL/L (ref 136–145)
WBC # BLD AUTO: 15.7 K/UL (ref 3.6–11)

## 2024-10-18 PROCEDURE — 2580000003 HC RX 258: Performed by: HOSPITALIST

## 2024-10-18 PROCEDURE — 83735 ASSAY OF MAGNESIUM: CPT

## 2024-10-18 PROCEDURE — 86140 C-REACTIVE PROTEIN: CPT

## 2024-10-18 PROCEDURE — 36415 COLL VENOUS BLD VENIPUNCTURE: CPT

## 2024-10-18 PROCEDURE — 85025 COMPLETE CBC W/AUTO DIFF WBC: CPT

## 2024-10-18 PROCEDURE — 94640 AIRWAY INHALATION TREATMENT: CPT

## 2024-10-18 PROCEDURE — 2580000003 HC RX 258: Performed by: INTERNAL MEDICINE

## 2024-10-18 PROCEDURE — 6360000002 HC RX W HCPCS: Performed by: HOSPITALIST

## 2024-10-18 PROCEDURE — 2700000000 HC OXYGEN THERAPY PER DAY

## 2024-10-18 PROCEDURE — 6370000000 HC RX 637 (ALT 250 FOR IP): Performed by: HOSPITALIST

## 2024-10-18 PROCEDURE — 84145 PROCALCITONIN (PCT): CPT

## 2024-10-18 PROCEDURE — 82962 GLUCOSE BLOOD TEST: CPT

## 2024-10-18 PROCEDURE — 80069 RENAL FUNCTION PANEL: CPT

## 2024-10-18 PROCEDURE — 6360000002 HC RX W HCPCS: Performed by: INTERNAL MEDICINE

## 2024-10-18 PROCEDURE — 6370000000 HC RX 637 (ALT 250 FOR IP)

## 2024-10-18 PROCEDURE — 6370000000 HC RX 637 (ALT 250 FOR IP): Performed by: PHYSICIAN ASSISTANT

## 2024-10-18 PROCEDURE — 1100000000 HC RM PRIVATE

## 2024-10-18 PROCEDURE — 94760 N-INVAS EAR/PLS OXIMETRY 1: CPT

## 2024-10-18 PROCEDURE — 6370000000 HC RX 637 (ALT 250 FOR IP): Performed by: INTERNAL MEDICINE

## 2024-10-18 RX ORDER — PREDNISONE 20 MG/1
40 TABLET ORAL 2 TIMES DAILY
Status: DISCONTINUED | OUTPATIENT
Start: 2024-10-18 | End: 2024-10-19 | Stop reason: HOSPADM

## 2024-10-18 RX ADMIN — IPRATROPIUM BROMIDE AND ALBUTEROL SULFATE 1 DOSE: .5; 3 SOLUTION RESPIRATORY (INHALATION) at 08:01

## 2024-10-18 RX ADMIN — ASPIRIN 81 MG: 81 TABLET, COATED ORAL at 07:52

## 2024-10-18 RX ADMIN — IPRATROPIUM BROMIDE AND ALBUTEROL SULFATE 1 DOSE: .5; 3 SOLUTION RESPIRATORY (INHALATION) at 04:11

## 2024-10-18 RX ADMIN — Medication 2 PUFF: at 08:01

## 2024-10-18 RX ADMIN — IPRATROPIUM BROMIDE AND ALBUTEROL SULFATE 1 DOSE: .5; 3 SOLUTION RESPIRATORY (INHALATION) at 11:23

## 2024-10-18 RX ADMIN — PREDNISONE 40 MG: 20 TABLET ORAL at 20:53

## 2024-10-18 RX ADMIN — SALINE NASAL SPRAY 1 SPRAY: 1.5 SOLUTION NASAL at 09:58

## 2024-10-18 RX ADMIN — INSULIN LISPRO 2 UNITS: 100 INJECTION, SOLUTION INTRAVENOUS; SUBCUTANEOUS at 16:59

## 2024-10-18 RX ADMIN — AZITHROMYCIN MONOHYDRATE 500 MG: 500 INJECTION, POWDER, LYOPHILIZED, FOR SOLUTION INTRAVENOUS at 17:05

## 2024-10-18 RX ADMIN — GUAIFENESIN 600 MG: 600 TABLET, EXTENDED RELEASE ORAL at 07:52

## 2024-10-18 RX ADMIN — AMLODIPINE BESYLATE 10 MG: 5 TABLET ORAL at 07:51

## 2024-10-18 RX ADMIN — ROPINIROLE HYDROCHLORIDE 4 MG: 1 TABLET, FILM COATED ORAL at 07:51

## 2024-10-18 RX ADMIN — IPRATROPIUM BROMIDE AND ALBUTEROL SULFATE 1 DOSE: .5; 3 SOLUTION RESPIRATORY (INHALATION) at 20:23

## 2024-10-18 RX ADMIN — Medication 5 MG: at 20:52

## 2024-10-18 RX ADMIN — MELOXICAM 7.5 MG: 7.5 TABLET ORAL at 07:51

## 2024-10-18 RX ADMIN — Medication 2 PUFF: at 20:23

## 2024-10-18 RX ADMIN — ATORVASTATIN CALCIUM 20 MG: 20 TABLET, FILM COATED ORAL at 20:53

## 2024-10-18 RX ADMIN — CEFTRIAXONE SODIUM 2000 MG: 2 INJECTION, POWDER, FOR SOLUTION INTRAMUSCULAR; INTRAVENOUS at 17:00

## 2024-10-18 RX ADMIN — ROPINIROLE HYDROCHLORIDE 4 MG: 1 TABLET, FILM COATED ORAL at 20:52

## 2024-10-18 RX ADMIN — SODIUM CHLORIDE, PRESERVATIVE FREE 10 ML: 5 INJECTION INTRAVENOUS at 07:52

## 2024-10-18 RX ADMIN — INSULIN LISPRO 4 UNITS: 100 INJECTION, SOLUTION INTRAVENOUS; SUBCUTANEOUS at 11:31

## 2024-10-18 RX ADMIN — GUAIFENESIN 600 MG: 600 TABLET, EXTENDED RELEASE ORAL at 20:53

## 2024-10-18 RX ADMIN — Medication 400 MG: at 20:53

## 2024-10-18 RX ADMIN — BENZONATATE 100 MG: 100 CAPSULE ORAL at 07:51

## 2024-10-18 RX ADMIN — Medication 400 MG: at 07:51

## 2024-10-18 RX ADMIN — ENOXAPARIN SODIUM 40 MG: 100 INJECTION SUBCUTANEOUS at 07:50

## 2024-10-18 RX ADMIN — IPRATROPIUM BROMIDE AND ALBUTEROL SULFATE 1 DOSE: .5; 3 SOLUTION RESPIRATORY (INHALATION) at 16:36

## 2024-10-18 RX ADMIN — SODIUM CHLORIDE, PRESERVATIVE FREE 10 ML: 5 INJECTION INTRAVENOUS at 20:53

## 2024-10-18 RX ADMIN — BENZONATATE 100 MG: 100 CAPSULE ORAL at 20:53

## 2024-10-18 RX ADMIN — LEVOTHYROXINE SODIUM 50 MCG: 0.03 TABLET ORAL at 07:52

## 2024-10-18 RX ADMIN — PREDNISONE 40 MG: 20 TABLET ORAL at 07:52

## 2024-10-18 RX ADMIN — INSULIN LISPRO 2 UNITS: 100 INJECTION, SOLUTION INTRAVENOUS; SUBCUTANEOUS at 21:02

## 2024-10-18 ASSESSMENT — PAIN SCALES - GENERAL
PAINLEVEL_OUTOF10: 0
PAINLEVEL_OUTOF10: 2

## 2024-10-18 ASSESSMENT — PAIN DESCRIPTION - DESCRIPTORS: DESCRIPTORS: DISCOMFORT

## 2024-10-18 NOTE — PROGRESS NOTES
10/18/24 1341   Resting (Room Air)   SpO2 94   Resting (On O2)   SpO2 92   O2 Device Nasal cannula   O2 Flow Rate (l/min) 2 l/min   During Walk (Room Air)   SpO2 93   Symptoms Dizziness   During Walk (On O2)   SpO2 95   O2 Flow Rate (l/min) 2 l/min   After Walk   SpO2 96   O2 Device Nasal cannula   O2 Flow Rate (l/min) 2 l/min

## 2024-10-18 NOTE — PROGRESS NOTES
Hospitalist Progress Note    NAME:   Eveline Raphael   : 1950   MRN: [id number]     Date/Time: 10/18/2024 9:42 AM  Patient PCP: Per Rosa MD    Estimated discharge date:  Barriers:       Assessment / Plan:    Suspected sepsis from right lung pneumonia  continue IV antibiotics, cultures NGTD, monitor clinical response.  Appreciate pulmonary input.     Acute COPD exacerbation  continue steroids, nebs, monitor clinical response slow improvement.     Acute hypoxic respiratory failure  Secondary to COPD exacerbation and pneumonia.  Continues on 2 L NC O2.     Right lung nodule   pulmonary consult requested likely would need repeat CT chest within 1 year per pulmonary note.     HTN   monitor BP on home     DMT2   uncontrolled with hyperglycemia from steroid monitor adjust BG accordingly     RLS   requip      Hypokalemia  replete K    DVT prophylaxis   Lovenox SC      Medical Decision Making:   I personally reviewed labs: CBC, BMP  I personally reviewed imaging:  I personally reviewed EKG:  Toxic drug monitoring:   Discussed case with: Patient, nursing    Subjective:     Chief Complaint / Reason for Physician Visit  \" Shortness of breath\".  Discussed with RN events overnight.     10/15 --  Admission H&P  73 y.o.  female with PMHx significant for COPD, hypertension, non-insulin-dependent diabetes mellitus type 2, hypothyroidism and restless leg syndrome who presents to the emergency department complaining of shortness of breath for the last several days.  In the emergency department she was found to have 4-4 SIRS criteria with concern of sepsis secondary to right upper lobe pneumonia.  Pulmonary embolism was considered and ruled out with a negative CTA chest.  Also concern of a COPD exacerbation.  She required 2 L of oxygen via nasal cannula when sleeping in the emergency department.  She will be treated with ceftriaxone and azithromycin, sputum culture will be obtained, blood cultures will be  and cultures  YES  Reviewed most current radiology test results   YES  Review and summation of old records today    NO  Reviewed patient's current orders and MAR    YES  PMH/SH reviewed - no change compared to H&P    Procedures: see electronic medical records for all procedures/Xrays and details which were not copied into this note but were reviewed prior to creation of Plan.      LABS:  I reviewed today's most current labs and imaging studies.  Pertinent labs include:  Recent Labs     10/16/24  0614 10/17/24  0819 10/18/24  0628   WBC 16.1* 20.1* 15.7*   HGB 12.1 11.2* 10.2*   HCT 37.6 34.4* 31.3*    274 281     Recent Labs     10/15/24  1730 10/16/24  0614 10/17/24  0819 10/18/24  0628   * 136 135* 139   K 3.8 3.5 3.4* 3.6   CL 95* 102 99 104   CO2 31 25 31 32   GLUCOSE 174* 236* 144* 115*   BUN 10 16 19 16   CREATININE 0.97 0.85 0.84 0.70   CALCIUM 8.9 8.8 9.1 8.8   MG  --   --  1.8 2.0   PHOS  --   --  3.3 2.4*   BILITOT 0.5  --   --   --    AST 23  --   --   --    ALT 27  --   --   --        Signed: CESAR CRAIN MD

## 2024-10-18 NOTE — CARE COORDINATION
CM reviewed chart.     DCP is home/self.     Per respiratory report patient qualifies for home oxygen. Choice letter obtained and placed in chart. Referral sent to [facility].    CM will continue to follow.

## 2024-10-18 NOTE — PROGRESS NOTES
Pulmonology Progress Note    Subjective:     Chief Complaint:   Chief Complaint   Patient presents with    Shortness of Breath    Cough    Nasal Congestion    Wheezing        Patient seen and examined in her room on the floor this afternoon, no acute events overnight.  Still on 2 L nasal cannula, completed a walking O2 test again today and was 94% on room air at rest and 93% on room air with exertion, but apparently became dizzy and was then put on 2 L nasal cannula.  But there was no reports of any desaturations.  White blood cell count finally down to 15.7 from 20.3 yesterday.  Sodium level normalized, potassium level normalized.  Negative mycoplasma testing.  Still quite coarse on exam, I will switch her prednisone to twice daily dosing for now.  Nevertheless, hopefully she can be discharged within the next 24 hours with/without supplemental oxygen.         Current Facility-Administered Medications   Medication Dose Route Frequency Provider Last Rate Last Admin    magnesium oxide (MAG-OX) tablet 400 mg  400 mg Oral BID Wilson Vega MD   400 mg at 10/18/24 0751    sodium chloride (OCEAN, BABY AYR) 0.65 % nasal spray 1 spray  1 spray Each Nostril Q2H PRN Alexis Wu PA-C   1 spray at 10/18/24 0958    amLODIPine (NORVASC) tablet 10 mg  10 mg Oral Daily Andrew Barger MD   10 mg at 10/18/24 0751    aspirin EC tablet 81 mg  81 mg Oral Daily Andrew Barger MD   81 mg at 10/18/24 0752    atorvastatin (LIPITOR) tablet 20 mg  20 mg Oral Nightly Andrew Barger MD   20 mg at 10/17/24 2111    budesonide-formoterol (SYMBICORT) 160-4.5 MCG/ACT inhaler 2 puff  2 puff Inhalation BID RT Andrew Barger MD   2 puff at 10/18/24 0801    And    tiotropium (SPIRIVA RESPIMAT) 2.5 MCG/ACT inhaler 2 puff  2 puff Inhalation Daily RT Andrew Barger MD   2 puff at 10/16/24 0847    levothyroxine (SYNTHROID) tablet 50 mcg  50 mcg Oral QAM Andrew Barger MD   50 mcg at 10/18/24 0752    meloxicam (MOBIC) tablet 7.5 mg   Creatinine 0.70 0.55 - 1.02 mg/dL    BUN/Creatinine Ratio 23 (H) 12 - 20      Est, Glom Filt Rate >90 >60 ml/min/1.73m2    Calcium 8.8 8.5 - 10.1 mg/dL    Phosphorus 2.4 (L) 2.6 - 4.7 mg/dL    Albumin 2.9 (L) 3.5 - 5.0 g/dL   Procalcitonin    Collection Time: 10/18/24  6:28 AM   Result Value Ref Range    Procalcitonin 0.18 (H) 0 ng/mL   C-Reactive Protein    Collection Time: 10/18/24  6:28 AM   Result Value Ref Range    CRP 2.86 (H) 0.00 - 0.30 mg/dL   POCT Glucose    Collection Time: 10/18/24  7:38 AM   Result Value Ref Range    POC Glucose 132 (H) 65 - 100 mg/dL    Performed by: Rene Tamez    POCT Glucose    Collection Time: 10/18/24 11:17 AM   Result Value Ref Range    POC Glucose 286 (H) 65 - 100 mg/dL    Performed by: Tonja Aguilar (Float)        CTA chest (10/15/2024):    FINDINGS: This is a good quality study for the evaluation of pulmonary embolism  to the first subsegmental arterial level. There is no pulmonary embolism to this  level.        MEDIASTINUM: No mass or lymphadenopathy.  SREE: No mass or lymphadenopathy.  THORACIC AORTA: No aneurysm.  HEART: Normal in size.  ESOPHAGUS: No wall thickening or dilatation.  TRACHEA/BRONCHI: Patent.  PLEURA: No effusion or pneumothorax.  LUNGS: Patchy airspace disease in the right middle lobe. Right lower lobe 5 mm  nodule, new. Right apical 5 mm partially calcified nodule, stable  UPPER ABDOMEN: Partially imaged. No acute pathology. Decreased density of the  liver. Prior cholecystectomy.  BONES: No aggressive bone lesion or fracture.     IMPRESSION:  No pulmonary embolism  Right upper lobe pneumonia  Right lower lobe nodule is new as compared to the prior study. Recommend  follow-up after the acute infectious process has resolved to determine if this  is truly a nodule or represents a focus of bilobar pneumonia  Hepatic steatosis      Assessment:     Patient is a 73-year-old  female with a history of obesity, hypothyroidism, non-insulin-dependent  outpatient diagnostic polysomnogram        CODE STATUS: Full Code     Disposition and Family: Stable to transfer to floor.    Total time spent with patient: 30 minutes      Edmund Elder DO  [facility] (PAT)  10/18/2024  2:20 PM

## 2024-10-18 NOTE — PLAN OF CARE
Problem: Chronic Conditions and Co-morbidities  Goal: Patient's chronic conditions and co-morbidity symptoms are monitored and maintained or improved  Outcome: Progressing  Flowsheets (Taken 10/17/2024 0858 by Brian Garcia, RN)  Care Plan - Patient's Chronic Conditions and Co-Morbidity Symptoms are Monitored and Maintained or Improved: Monitor and assess patient's chronic conditions and comorbid symptoms for stability, deterioration, or improvement     Problem: Discharge Planning  Goal: Discharge to home or other facility with appropriate resources  Outcome: Progressing  Flowsheets (Taken 10/17/2024 0858 by Brian Garcia, RN)  Discharge to home or other facility with appropriate resources: Identify barriers to discharge with patient and caregiver     Problem: Pain  Goal: Verbalizes/displays adequate comfort level or baseline comfort level  Outcome: Progressing

## 2024-10-18 NOTE — PLAN OF CARE
Problem: Chronic Conditions and Co-morbidities  Goal: Patient's chronic conditions and co-morbidity symptoms are monitored and maintained or improved  10/18/2024 0849 by Joi López RN  Outcome: Progressing  10/17/2024 2257 by Bryan Kelly RN  Outcome: Progressing  Flowsheets (Taken 10/17/2024 0858 by Brian Garcia, RN)  Care Plan - Patient's Chronic Conditions and Co-Morbidity Symptoms are Monitored and Maintained or Improved: Monitor and assess patient's chronic conditions and comorbid symptoms for stability, deterioration, or improvement     Problem: Discharge Planning  Goal: Discharge to home or other facility with appropriate resources  10/18/2024 0849 by Joi López RN  Outcome: Progressing  10/17/2024 2257 by Bryan Kelly RN  Outcome: Progressing  Flowsheets (Taken 10/17/2024 0858 by Brian Garcia, RN)  Discharge to home or other facility with appropriate resources: Identify barriers to discharge with patient and caregiver     Problem: Pain  Goal: Verbalizes/displays adequate comfort level or baseline comfort level  10/18/2024 0849 by Joi López RN  Outcome: Progressing  10/17/2024 2257 by Bryan Kelly RN  Outcome: Progressing     Problem: Nutrition Deficit:  Goal: Optimize nutritional status  Outcome: Progressing     Problem: Skin/Tissue Integrity  Goal: Absence of new skin breakdown  Description: 1.  Monitor for areas of redness and/or skin breakdown  2.  Assess vascular access sites hourly  3.  Every 4-6 hours minimum:  Change oxygen saturation probe site  4.  Every 4-6 hours:  If on nasal continuous positive airway pressure, respiratory therapy assess nares and determine need for appliance change or resting period.  Outcome: Progressing

## 2024-10-19 VITALS
RESPIRATION RATE: 18 BRPM | TEMPERATURE: 97.7 F | OXYGEN SATURATION: 95 % | BODY MASS INDEX: 29.86 KG/M2 | WEIGHT: 162.26 LBS | HEART RATE: 76 BPM | DIASTOLIC BLOOD PRESSURE: 74 MMHG | HEIGHT: 62 IN | SYSTOLIC BLOOD PRESSURE: 118 MMHG

## 2024-10-19 PROBLEM — A41.9 SEPSIS (HCC): Status: RESOLVED | Noted: 2024-10-15 | Resolved: 2024-10-19

## 2024-10-19 PROBLEM — J96.91 HYPOXIC RESPIRATORY FAILURE: Status: ACTIVE | Noted: 2021-12-05

## 2024-10-19 LAB
ALBUMIN SERPL-MCNC: 3.5 G/DL (ref 3.5–5)
ANION GAP SERPL CALC-SCNC: 4 MMOL/L (ref 2–12)
BASOPHILS # BLD: 0.2 K/UL (ref 0–0.1)
BASOPHILS NFR BLD: 1 % (ref 0–1)
BUN SERPL-MCNC: 12 MG/DL (ref 6–20)
BUN/CREAT SERPL: 15 (ref 12–20)
CA-I BLD-MCNC: 9.4 MG/DL (ref 8.5–10.1)
CHLORIDE SERPL-SCNC: 101 MMOL/L (ref 97–108)
CO2 SERPL-SCNC: 32 MMOL/L (ref 21–32)
CREAT SERPL-MCNC: 0.79 MG/DL (ref 0.55–1.02)
DIFFERENTIAL METHOD BLD: ABNORMAL
EOSINOPHIL # BLD: 0 K/UL (ref 0–0.4)
EOSINOPHIL NFR BLD: 0 % (ref 0–7)
ERYTHROCYTE [DISTWIDTH] IN BLOOD BY AUTOMATED COUNT: 13.2 % (ref 11.5–14.5)
GLUCOSE BLD STRIP.AUTO-MCNC: 187 MG/DL (ref 65–100)
GLUCOSE BLD STRIP.AUTO-MCNC: 192 MG/DL (ref 65–100)
GLUCOSE SERPL-MCNC: 186 MG/DL (ref 65–100)
HCT VFR BLD AUTO: 37.1 % (ref 35–47)
HGB BLD-MCNC: 12 G/DL (ref 11.5–16)
IMM GRANULOCYTES # BLD AUTO: 0 K/UL (ref 0–0.04)
IMM GRANULOCYTES NFR BLD AUTO: 0 % (ref 0–0.5)
LYMPHOCYTES # BLD: 3.8 K/UL (ref 0.8–3.5)
LYMPHOCYTES NFR BLD: 22 % (ref 12–49)
MAGNESIUM SERPL-MCNC: 2.1 MG/DL (ref 1.6–2.4)
MCH RBC QN AUTO: 27.3 PG (ref 26–34)
MCHC RBC AUTO-ENTMCNC: 32.3 G/DL (ref 30–36.5)
MCV RBC AUTO: 84.3 FL (ref 80–99)
METAMYELOCYTES NFR BLD MANUAL: 2 %
MONOCYTES # BLD: 1 K/UL (ref 0–1)
MONOCYTES NFR BLD: 6 % (ref 5–13)
MYELOCYTES NFR BLD MANUAL: 1 %
NEUTS BAND NFR BLD MANUAL: 5 %
NEUTS SEG # BLD: 11.8 K/UL (ref 1.8–8)
NEUTS SEG NFR BLD: 63 % (ref 32–75)
NRBC # BLD: 0.02 K/UL (ref 0–0.01)
NRBC BLD-RTO: 0.1 PER 100 WBC
PERFORMED BY:: ABNORMAL
PERFORMED BY:: ABNORMAL
PHOSPHATE SERPL-MCNC: 3.1 MG/DL (ref 2.6–4.7)
PLATELET # BLD AUTO: 324 K/UL (ref 150–400)
PMV BLD AUTO: 10.8 FL (ref 8.9–12.9)
POTASSIUM SERPL-SCNC: 3.4 MMOL/L (ref 3.5–5.1)
RBC # BLD AUTO: 4.4 M/UL (ref 3.8–5.2)
RBC MORPH BLD: ABNORMAL
SODIUM SERPL-SCNC: 137 MMOL/L (ref 136–145)
WBC # BLD AUTO: 17.4 K/UL (ref 3.6–11)

## 2024-10-19 PROCEDURE — 94640 AIRWAY INHALATION TREATMENT: CPT

## 2024-10-19 PROCEDURE — 94761 N-INVAS EAR/PLS OXIMETRY MLT: CPT

## 2024-10-19 PROCEDURE — 6370000000 HC RX 637 (ALT 250 FOR IP): Performed by: HOSPITALIST

## 2024-10-19 PROCEDURE — 80069 RENAL FUNCTION PANEL: CPT

## 2024-10-19 PROCEDURE — 6370000000 HC RX 637 (ALT 250 FOR IP): Performed by: INTERNAL MEDICINE

## 2024-10-19 PROCEDURE — 6370000000 HC RX 637 (ALT 250 FOR IP)

## 2024-10-19 PROCEDURE — 2700000000 HC OXYGEN THERAPY PER DAY

## 2024-10-19 PROCEDURE — 6370000000 HC RX 637 (ALT 250 FOR IP): Performed by: PHYSICIAN ASSISTANT

## 2024-10-19 PROCEDURE — 83735 ASSAY OF MAGNESIUM: CPT

## 2024-10-19 PROCEDURE — 85025 COMPLETE CBC W/AUTO DIFF WBC: CPT

## 2024-10-19 PROCEDURE — 36415 COLL VENOUS BLD VENIPUNCTURE: CPT

## 2024-10-19 PROCEDURE — 82962 GLUCOSE BLOOD TEST: CPT

## 2024-10-19 PROCEDURE — 2580000003 HC RX 258: Performed by: HOSPITALIST

## 2024-10-19 RX ORDER — BENZONATATE 100 MG/1
100 CAPSULE ORAL 3 TIMES DAILY PRN
Qty: 10 CAPSULE | Refills: 0 | Status: SHIPPED | OUTPATIENT
Start: 2024-10-19 | End: 2024-10-26

## 2024-10-19 RX ORDER — IPRATROPIUM BROMIDE AND ALBUTEROL SULFATE 2.5; .5 MG/3ML; MG/3ML
1 SOLUTION RESPIRATORY (INHALATION)
Status: DISCONTINUED | OUTPATIENT
Start: 2024-10-19 | End: 2024-10-19 | Stop reason: HOSPADM

## 2024-10-19 RX ORDER — NEBULIZER ACCESSORIES
1 KIT MISCELLANEOUS DAILY PRN
Qty: 1 KIT | Refills: 0 | Status: SHIPPED | OUTPATIENT
Start: 2024-10-19

## 2024-10-19 RX ORDER — IPRATROPIUM BROMIDE AND ALBUTEROL SULFATE 2.5; .5 MG/3ML; MG/3ML
1 SOLUTION RESPIRATORY (INHALATION)
Status: DISCONTINUED | OUTPATIENT
Start: 2024-10-19 | End: 2024-10-19

## 2024-10-19 RX ORDER — PREDNISONE 20 MG/1
40 TABLET ORAL 2 TIMES DAILY
Qty: 12 TABLET | Refills: 0 | Status: SHIPPED | OUTPATIENT
Start: 2024-10-19 | End: 2024-10-22

## 2024-10-19 RX ORDER — PREDNISONE 20 MG/1
20 TABLET ORAL DAILY
Qty: 3 TABLET | Refills: 0 | Status: SHIPPED | OUTPATIENT
Start: 2024-10-23 | End: 2024-10-26

## 2024-10-19 RX ORDER — LANOLIN ALCOHOL/MO/W.PET/CERES
400 CREAM (GRAM) TOPICAL 2 TIMES DAILY
Qty: 1 TABLET | Refills: 0 | Status: SHIPPED | OUTPATIENT
Start: 2024-10-19 | End: 2024-10-20

## 2024-10-19 RX ORDER — IPRATROPIUM BROMIDE AND ALBUTEROL SULFATE 2.5; .5 MG/3ML; MG/3ML
3 SOLUTION RESPIRATORY (INHALATION) EVERY 6 HOURS PRN
Qty: 360 ML | Refills: 0 | Status: SHIPPED | OUTPATIENT
Start: 2024-10-19 | End: 2024-11-18

## 2024-10-19 RX ORDER — POTASSIUM CHLORIDE 1.5 G/1.58G
40 POWDER, FOR SOLUTION ORAL ONCE
Status: COMPLETED | OUTPATIENT
Start: 2024-10-19 | End: 2024-10-19

## 2024-10-19 RX ORDER — GUAIFENESIN 600 MG/1
600 TABLET, EXTENDED RELEASE ORAL 2 TIMES DAILY
Qty: 14 TABLET | Refills: 0 | Status: SHIPPED | OUTPATIENT
Start: 2024-10-19 | End: 2024-10-26

## 2024-10-19 RX ADMIN — AMLODIPINE BESYLATE 10 MG: 5 TABLET ORAL at 07:48

## 2024-10-19 RX ADMIN — SODIUM CHLORIDE, PRESERVATIVE FREE 10 ML: 5 INJECTION INTRAVENOUS at 07:48

## 2024-10-19 RX ADMIN — LEVOTHYROXINE SODIUM 50 MCG: 0.03 TABLET ORAL at 07:48

## 2024-10-19 RX ADMIN — MELOXICAM 7.5 MG: 7.5 TABLET ORAL at 07:48

## 2024-10-19 RX ADMIN — Medication 400 MG: at 07:48

## 2024-10-19 RX ADMIN — ASPIRIN 81 MG: 81 TABLET, COATED ORAL at 07:48

## 2024-10-19 RX ADMIN — POTASSIUM CHLORIDE 40 MEQ: 1.5 POWDER, FOR SOLUTION ORAL at 13:00

## 2024-10-19 RX ADMIN — PREDNISONE 40 MG: 20 TABLET ORAL at 07:48

## 2024-10-19 RX ADMIN — BENZONATATE 100 MG: 100 CAPSULE ORAL at 07:48

## 2024-10-19 RX ADMIN — GUAIFENESIN 600 MG: 600 TABLET, EXTENDED RELEASE ORAL at 07:48

## 2024-10-19 RX ADMIN — INSULIN LISPRO 2 UNITS: 100 INJECTION, SOLUTION INTRAVENOUS; SUBCUTANEOUS at 11:34

## 2024-10-19 RX ADMIN — ROPINIROLE HYDROCHLORIDE 4 MG: 1 TABLET, FILM COATED ORAL at 07:47

## 2024-10-19 RX ADMIN — IPRATROPIUM BROMIDE AND ALBUTEROL SULFATE 1 DOSE: .5; 2.5 SOLUTION RESPIRATORY (INHALATION) at 07:35

## 2024-10-19 RX ADMIN — Medication 2 PUFF: at 07:35

## 2024-10-19 RX ADMIN — IPRATROPIUM BROMIDE AND ALBUTEROL SULFATE 1 DOSE: .5; 2.5 SOLUTION RESPIRATORY (INHALATION) at 01:47

## 2024-10-19 NOTE — DISCHARGE SUMMARY
Discharge Summary    Name: Eveline Raphael  [id number]  YOB: 1950 (Age: 73 y.o.)   Date of Admission: 10/15/2024  Date of Discharge: 10/19/2024  Attending Physician: Edgar Pedraza MD    Discharge Diagnosis:   Principal Problem (Resolved):    Sepsis (HCC)  Active Problems:    Hypoxic respiratory failure    Chronic obstructive pulmonary disease (HCC)       Consultations:  IP CONSULT TO PULMONOLOGY    Brief Hospital Course by Main Problems:   73 y.o. female with PMHx significant for COPD, hypertension, non-insulin-dependent diabetes mellitus type 2, hypothyroidism and restless leg syndrome who presents to the emergency department complaining of shortness of breath for the last several days. In the emergency department she was found to have 4-4 SIRS criteria with concern of sepsis secondary to right upper lobe pneumonia. Pulmonary embolism was considered and ruled out with a negative CTA chest. Also concern of a COPD exacerbation. She required 2 L of oxygen via nasal cannula when sleeping in the emergency department. She was admitted and treated with ceftriaxone and azithromycin, sputum culture and blood cultures obtained, PO prednisone, scheduled DuoNebs and as needed albuterol, pulmonology consult.  She remained on 2 L via nasal cannula.  A walking oxygen test determined patient qualified for home oxygen, which was arranged with CM.  Pulmonology following, recommending continue Symbicort/Spiriva while admitted, 40 mg twice daily prednisone, slowly taper off.  Patient continued to improve symptomatically and at this time is felt to be medically stable for discharge.  Pulmonology is in agreement, will have patient follow-up as an outpatient.  Patient and daughter at bedside understand and agree with the plan.  She will follow-up with PCP, pulmonology as listed below.    Right lung pneumonia  Continue Augmentin  Continue Mucinex, Tessalon Perles as  tablet  Commonly known as: REQUIP  TAKE 1 TABLET BY MOUTH TWICE  DAILY  What changed: Another medication with the same name was removed. Continue taking this medication, and follow the directions you see here.            CONTINUE taking these medications      albuterol sulfate  (90 Base) MCG/ACT inhaler  Commonly known as: PROVENTIL;VENTOLIN;PROAIR     amLODIPine 10 MG tablet  Commonly known as: NORVASC  Take 1 tablet by mouth daily     aspirin 81 MG EC tablet     atorvastatin 20 MG tablet  Commonly known as: LIPITOR  Take 1 tablet by mouth daily     gabapentin 300 MG capsule  Commonly known as: NEURONTIN  TAKE 1 CAPSULE THREE TIMES DAILY (NEED MD APPOINTMENT)     levothyroxine 50 MCG tablet  Commonly known as: SYNTHROID  Take 1 tablet by mouth every morning     LORazepam 0.5 MG tablet  Commonly known as: ATIVAN     losartan-hydroCHLOROthiazide 100-25 MG per tablet  Commonly known as: HYZAAR  Take 1 tablet by mouth daily     meloxicam 7.5 MG tablet  Commonly known as: MOBIC  Take 1 tablet by mouth daily     metFORMIN 500 MG extended release tablet  Commonly known as: GLUCOPHAGE-XR  Take 1 tablet by mouth 2 times daily     Trelegy Ellipta 100-62.5-25 MCG/ACT Aepb inhaler  Generic drug: fluticasone-umeclidin-vilant            STOP taking these medications      cyanocobalamin 2000 MCG tablet               Where to Get Your Medications        [id number] - [place], [place] - [address] - P [phone] - F [phone]  [address] [facility] [address]      Phone: [phone]   amoxicillin-clavulanate 875-125 MG per tablet  benzonatate 100 MG capsule  guaiFENesin 600 MG extended release tablet  ipratropium 0.5 mg-albuterol 2.5 mg 0.5-2.5 (3) MG/3ML Soln nebulizer solution  magnesium oxide 400 (240 Mg) MG tablet  Nebulizer/Tubing/Mouthpiece Kit  predniSONE 20 MG tablet  predniSONE 20 MG tablet             DISPOSITION:    Home with Family:    x

## 2024-10-19 NOTE — PLAN OF CARE
Problem: Chronic Conditions and Co-morbidities  Goal: Patient's chronic conditions and co-morbidity symptoms are monitored and maintained or improved  Outcome: Progressing     Problem: Discharge Planning  Goal: Discharge to home or other facility with appropriate resources  Outcome: Progressing     Problem: Pain  Goal: Verbalizes/displays adequate comfort level or baseline comfort level  Outcome: Progressing     Problem: Nutrition Deficit:  Goal: Optimize nutritional status  Outcome: Progressing     Problem: Skin/Tissue Integrity  Goal: Absence of new skin breakdown  Description: 1.  Monitor for areas of redness and/or skin breakdown  2.  Assess vascular access sites hourly  3.  Every 4-6 hours minimum:  Change oxygen saturation probe site  4.  Every 4-6 hours:  If on nasal continuous positive airway pressure, respiratory therapy assess nares and determine need for appliance change or resting period.  Outcome: Progressing     Problem: Safety - Adult  Goal: Free from fall injury  Outcome: Progressing

## 2024-10-19 NOTE — PROGRESS NOTES
Pulmonology Progress Note    Subjective:     Patient seen and examined in her room.  Family at the bedside.  Discussed with nursing staff.  On 2 L oxygen.  Oxygen has been arranged for home.  She is hoping to go home soon.  She is feeling better.  Small amount of sputum production but is improving.  She is up and about in the room.  She tells me that she never smoked.    On 10/18/2024:  Patient seen and examined in her room on the floor this afternoon, no acute events overnight.  Still on 2 L nasal cannula, completed a walking O2 test again today and was 94% on room air at rest and 93% on room air with exertion, but apparently became dizzy and was then put on 2 L nasal cannula.  But there was no reports of any desaturations.  White blood cell count finally down to 15.7 from 20.3 yesterday.  Sodium level normalized, potassium level normalized.  Negative mycoplasma testing.  Still quite coarse on exam, I will switch her prednisone to twice daily dosing for now.  Nevertheless, hopefully she can be discharged within the next 24 hours with/without supplemental oxygen.         Current Facility-Administered Medications   Medication Dose Route Frequency Provider Last Rate Last Admin    ipratropium 0.5 mg-albuterol 2.5 mg (DUONEB) nebulizer solution 1 Dose  1 Dose Inhalation Q6H RT Edmund Elder DO   1 Dose at 10/19/24 0735    predniSONE (DELTASONE) tablet 40 mg  40 mg Oral BID Edmund Elder DO   40 mg at 10/19/24 0748    magnesium oxide (MAG-OX) tablet 400 mg  400 mg Oral BID Wilson Vega MD   400 mg at 10/19/24 0748    sodium chloride (OCEAN, BABY AYR) 0.65 % nasal spray 1 spray  1 spray Each Nostril Q2H PRN Alexis Wu PA-C   1 spray at 10/18/24 0958    amLODIPine (NORVASC) tablet 10 mg  10 mg Oral Daily Andrew Barger MD   10 mg at 10/19/24 0748    aspirin EC tablet 81 mg  81 mg Oral Daily Andrew Barger MD   81 mg at 10/19/24 0748    atorvastatin (LIPITOR) tablet 20 mg  20 mg Oral Nightly Andrew Barger  diabetes mellitus, reported COPD, hypertension, and hyperlipidemia who presented to the hospital with increasing shortness of breath and was found to have right middle lobe pneumonia.    Plan:     1.)  Community-acquired pneumonia with acute hypoxemic respiratory failure  -Right middle lobe infiltrate noted on CT imaging  -Negative COVID-19/influenza testing, sending off expanded infectious workup  -I agree with IV Rocephin/azithromycin for total of 7 days, convert to oral antibiotics at discharge.  -Leukocytosis improving today  -Currently on 2 L nasal cannula, wean off for goal sats greater than 90% on room air.  -Oxygen has been arranged for home.  Agree with discharge plans.    2.)  Leukocytosis  -White blood cell count 14.1 admission, likely elevated in the setting of pneumonia  -Rising further to 20.1 on 10/17, possibly a steroid effect.  Now coming down to 15.7 today.  -Currently afebrile  -Continue antibiotics as outlined above  -Repeat CBC in the morning    3.)  Hypokalemia  -Now normalized following repletion today  -Repeat BMP in the morning    4.)  COPD  -Reported diagnosis, although denies personal history of smoking.  She does admit to significant secondhand smoke exposure and the fact that asthma runs in her family.  -States that overall she is doing fairly well with outpatient Trelegy inhaler.  -Needs close outpatient pulmonary follow-up in our office with PFTs.  -Continue Symbicort/Spiriva while admitted.  -On 40 mg twice daily today.  Recommend to slowly taper off.    5.)  Pulmonary nodules  -0.5 cm right lower lobe nodule, 0.5 cm right apical nodule  -No history of smoking, overall these are likely low risk lesions.  -Recommend repeat CT chest in 1 year    6.)  Obesity  -BMI 30.9, due to excess calories  -Significant weight loss recommended   -Consider outpatient diagnostic polysomnogram    CODE STATUS: Full Code     Total time spent with patient: 30 minutes      Elenita Arteaga MD  Pulmonary and

## 2024-10-19 NOTE — CARE COORDINATION
Transition of Care Plan:    RUR: 8%  Prior Level of Functioning: INDP  Disposition: HOME/SELF W/ O2  If SNF or IPR: Date FOC offered:   Date FOC received:   Accepting facility: [facility]  Date authorization started with reference number:   Date authorization received and expires:   Follow up appointments: PCP  DME needed: O2 [employer]  Transportation at discharge: FAMILY  IM/IMM Medicare/ letter given: YES  Is patient a [place] and connected with [facility]? NO   If yes, was [place] transfer form completed and [facility] notified?   Caregiver Contact: DAUGHTER  Discharge Caregiver contacted prior to discharge? PT CONTACTED  Care Conference needed? NO  Barriers to discharge: NO    Pt to dc home w/ no further needs from CM. O2 has been delivered by [facility]. Family to dc.

## 2024-10-20 LAB
BACTERIA SPEC CULT: NORMAL
BACTERIA SPEC CULT: NORMAL
FLUID CULTURE: NORMAL
L PNEUMO1 AG UR QL IA: NEGATIVE
Lab: NORMAL
ORGANISM ID: NORMAL
S PNEUM AG SPEC QL LA: NEGATIVE
SPECIMEN SOURCE: NORMAL
SPECIMEN SOURCE: NORMAL
SPECIMEN: NORMAL

## 2024-10-21 NOTE — PROGRESS NOTES
Physician Progress Note      PATIENT:               ISHAN VIRK  [facility] #:                  [id number]  :                       1950  ADMIT DATE:       10/15/2024 5:22 PM  DISCH DATE:        10/19/2024 1:30 PM  RESPONDING  PROVIDER #:        Wilson Vega MD          QUERY TEXT:    Patient admitted with Sepsis. Noted to have Severe malnutrition  in  RD note   on 10/17. If possible, please document in progress notes and discharge summary   if you are evaluating and /or treating any of the following:    The medical record reflects the following:  Risk Factors: advanced age,DM ,CKD  Clinical Indicators: RD note on 10/17  Malnutrition Status:  Severe   malnutrition (10/17/24 1152)  Context:  Acute Illness  Findings of the 6 clinical characteristics of malnutrition:  Energy Intake:  50% or less of estimated energy requirements for 5 or more   days  Weight Loss:  Greater than 2% over 1 week  Body Fat Loss:  Unable to assess  Muscle Mass Loss:  Unable to assess  Fluid Accumulation:  Unable to assess   Strength:  Not Performed  Nutrition Diagnosis: Severe malnutrition related to inadequate protein-energy   intake as evidenced by weight loss greater than or equal to 2% in 1 week, poor   intake prior to admission  Height: 157.5 cm (5' 2\")  Ideal Body Weight (IBW): 110 lbs (50 kg)  BMi-29.6  Treatment: Continue Current Diet, Start Oral Nutrition Supplement  Thank you,  Xavier NAILS CDS      ASPEN Criteria:    https://aspenjournals.onlinelibrary.carbajal.com/doi/full/10.1177/188181429576878  5  Options provided:  -- Protein calorie malnutrition severe  -- Other - I will add my own diagnosis  -- Disagree - Not applicable / Not valid  -- Disagree - Clinically unable to determine / Unknown  -- Refer to Clinical Documentation Reviewer    PROVIDER RESPONSE TEXT:    This patient has severe protein calorie malnutrition.    Query created by: Xavier Back on 10/18/2024 7:32 AM      Electronically signed by:  Wilson Vega MD

## 2024-10-22 LAB
BACTERIA SPEC CULT: NORMAL
BACTERIA SPEC CULT: NORMAL
Lab: NORMAL
Lab: NORMAL

## 2024-10-28 NOTE — PROGRESS NOTES
Physician Progress Note      PATIENT:               ISHAN VIRK  [facility] #:                  [id number]  :                       1950  ADMIT DATE:       10/15/2024 5:22 PM  DISCH DATE:        10/19/2024 1:30 PM  RESPONDING  PROVIDER #:        Orlando Serna PA-C          QUERY TEXT:    Patient admitted with Sepsis. Noted documentation of acute respiratory hypoxic   failure in H&P on 10/15. In order to support the diagnosis of acute   respiratory failure, please include additional clinical indicators in your   documentation.  Or please document if the diagnosis of acute respiratory   failure has been ruled out after further study.    The medical record reflects the following:  Risk Factors: Pneumonia,COPD,Lung nodule  Clinical Indicators:ED Provider note 10/15 \"Pulmonary effort is normal.   Tachypnea present. No respiratory distress. Decreased breath sounds and   wheezing present\".    ED note 10/15 \"o2 sats dipped down to 88%. O2 @ 2L [place] placed. Maintaining   93-94% on O2\".    H&P 10/15 \"Lungs:   Expiratory wheezing on exam, not in acute distress; No   accessory muscle use.    Pulmonology consult 10/16 \"Prolonged exhalation phase without significant   wheezing, mild crackles in the right midlung area, currently on 1 L nasal   cannula\".    Hospitalist PN 10/18 \" CTA bilaterally, no wheezing or rales.  No accessory   muscle use\".  .    Spo2-90%,91%,92%  RR-2  Treatment:Nc-1-3,  albuterol  Nebulization ,Pulmonology consultation,    Thank you,  Xavier NAILS CDS  Options provided:  -- Acute Respiratory Failure as evidenced by, Please document evidence.  -- Acute Respiratory Failure ruled out after study  -- Other - I will add my own diagnosis  -- Disagree - Not applicable / Not valid  -- Disagree - Clinically unable to determine / Unknown  -- Refer to Clinical Documentation Reviewer    PROVIDER RESPONSE TEXT:    Acute Respiratory Failure has been ruled out after study.    Query created by: Xavier Back

## 2024-12-20 ENCOUNTER — HOSPITAL ENCOUNTER (OUTPATIENT)
Facility: HOSPITAL | Age: 74
Discharge: HOME | End: 2024-12-23
Attending: INTERNAL MEDICINE
Payer: MEDICARE

## 2024-12-20 DIAGNOSIS — J18.9 UNRESOLVED PNEUMONIA: ICD-10-CM

## 2024-12-20 PROCEDURE — 71250 CT THORAX DX C-: CPT

## (undated) DEVICE — MASK ANES INF SZ 2 PREM TAIL VLV INFL PRT UNSCENTED SGL PT

## (undated) DEVICE — THE ENDO CARRY-ON PROCEDURE KIT CONTAINS ALL OF THE SUPPLIES AND INFECTION PREVENTION PRODUCTS NEEDED FOR ENDOSCOPIC PROCEDURES: Brand: ENDO CARRY-ON PROCEDURE KIT

## (undated) DEVICE — SINGLE-USE BIOPSY FORCEPS: Brand: RADIAL JAW 4

## (undated) DEVICE — MOUTHPIECE ENDOSCP 20X27MM --

## (undated) DEVICE — FORCEPS BX L240CM JAW DIA2.8MM L CAP W/ NDL MIC MESH TOOTH